# Patient Record
Sex: MALE | Race: WHITE | Employment: FULL TIME | URBAN - METROPOLITAN AREA
[De-identification: names, ages, dates, MRNs, and addresses within clinical notes are randomized per-mention and may not be internally consistent; named-entity substitution may affect disease eponyms.]

---

## 2018-01-10 ENCOUNTER — HOSPITAL ENCOUNTER (OUTPATIENT)
Dept: PREADMISSION TESTING | Age: 58
Discharge: HOME | End: 2018-01-10
Payer: COMMERCIAL

## 2018-01-10 VITALS — BODY MASS INDEX: 30.92 KG/M2 | WEIGHT: 197 LBS | HEIGHT: 67 IN

## 2018-01-10 LAB
ANION GAP SERPL CALC-SCNC: 12 MMOL/L (ref 3–18)
ATRIAL RATE: 90 BPM
BACTERIA SPEC CULT: ABNORMAL
BUN SERPL-MCNC: 15 MG/DL (ref 7–18)
BUN/CREAT SERPL: 22 (ref 12–20)
CALCIUM SERPL-MCNC: 9.4 MG/DL (ref 8.5–10.1)
CALCULATED P AXIS, ECG09: 56 DEGREES
CALCULATED R AXIS, ECG10: -74 DEGREES
CALCULATED T AXIS, ECG11: 33 DEGREES
CHLORIDE SERPL-SCNC: 103 MMOL/L (ref 100–108)
CO2 SERPL-SCNC: 27 MMOL/L (ref 21–32)
CREAT SERPL-MCNC: 0.69 MG/DL (ref 0.6–1.3)
DIAGNOSIS, 93000: NORMAL
ERYTHROCYTE [DISTWIDTH] IN BLOOD BY AUTOMATED COUNT: 14 % (ref 11.6–14.5)
GLUCOSE SERPL-MCNC: 110 MG/DL (ref 74–99)
HCT VFR BLD AUTO: 43.8 % (ref 36–48)
HGB BLD-MCNC: 14.6 G/DL (ref 13–16)
MCH RBC QN AUTO: 30.1 PG (ref 24–34)
MCHC RBC AUTO-ENTMCNC: 33.3 G/DL (ref 31–37)
MCV RBC AUTO: 90.3 FL (ref 74–97)
P-R INTERVAL, ECG05: 160 MS
PLATELET # BLD AUTO: 490 K/UL (ref 135–420)
PMV BLD AUTO: 9.5 FL (ref 9.2–11.8)
POTASSIUM SERPL-SCNC: 4.4 MMOL/L (ref 3.5–5.5)
Q-T INTERVAL, ECG07: 376 MS
QRS DURATION, ECG06: 104 MS
QTC CALCULATION (BEZET), ECG08: 459 MS
RBC # BLD AUTO: 4.85 M/UL (ref 4.7–5.5)
SERVICE CMNT-IMP: ABNORMAL
SODIUM SERPL-SCNC: 142 MMOL/L (ref 136–145)
VENTRICULAR RATE, ECG03: 90 BPM
WBC # BLD AUTO: 9.7 K/UL (ref 4.6–13.2)

## 2018-01-10 PROCEDURE — 85027 COMPLETE CBC AUTOMATED: CPT | Performed by: ORTHOPAEDIC SURGERY

## 2018-01-10 PROCEDURE — 87641 MR-STAPH DNA AMP PROBE: CPT | Performed by: ORTHOPAEDIC SURGERY

## 2018-01-10 PROCEDURE — 93005 ELECTROCARDIOGRAM TRACING: CPT

## 2018-01-10 PROCEDURE — 80048 BASIC METABOLIC PNL TOTAL CA: CPT | Performed by: ORTHOPAEDIC SURGERY

## 2018-01-10 RX ORDER — TRAMADOL HYDROCHLORIDE 50 MG/1
50 TABLET ORAL
COMMUNITY
End: 2018-01-29

## 2018-01-10 RX ORDER — ROSUVASTATIN CALCIUM 40 MG/1
20 TABLET, COATED ORAL DAILY
COMMUNITY

## 2018-01-10 RX ORDER — DICLOFENAC SODIUM 75 MG/1
75 TABLET, DELAYED RELEASE ORAL 2 TIMES DAILY
COMMUNITY
End: 2018-01-29

## 2018-01-10 RX ORDER — CEFAZOLIN SODIUM 2 G/50ML
2 SOLUTION INTRAVENOUS ONCE
Status: CANCELLED | OUTPATIENT
Start: 2018-01-10 | End: 2018-01-10

## 2018-01-10 RX ORDER — NORTRIPTYLINE HYDROCHLORIDE 10 MG/1
40 CAPSULE ORAL
COMMUNITY
End: 2018-02-12

## 2018-01-10 RX ORDER — TIZANIDINE HYDROCHLORIDE 4 MG/1
4 CAPSULE, GELATIN COATED ORAL 2 TIMES DAILY
COMMUNITY
End: 2018-01-29

## 2018-01-10 RX ORDER — SODIUM CHLORIDE, SODIUM LACTATE, POTASSIUM CHLORIDE, CALCIUM CHLORIDE 600; 310; 30; 20 MG/100ML; MG/100ML; MG/100ML; MG/100ML
125 INJECTION, SOLUTION INTRAVENOUS CONTINUOUS
Status: CANCELLED | OUTPATIENT
Start: 2018-01-10

## 2018-01-11 NOTE — PERIOP NOTES
Notified surgeons office that patient is MRSA positive. Spoke with patient concerning positive MRSA, aware of RX from surgeon [facility], explained directions for use, explained colonization of MRSA and made aware of contact isolation when hospitalized.

## 2018-01-16 PROBLEM — M48.02 CERVICAL SPINAL STENOSIS: Status: ACTIVE | Noted: 2018-01-16

## 2018-01-16 PROBLEM — M54.10 RADICULOPATHY AFFECTING UPPER EXTREMITY: Status: ACTIVE | Noted: 2018-01-16

## 2018-01-16 PROBLEM — M47.812 CERVICAL SPONDYLOSIS: Status: ACTIVE | Noted: 2018-01-16

## 2018-01-16 NOTE — H&P
Patient Name:   Emili Capps  YOB: 1960      Chief Complaint:  Neck pain. History of Chief Complaint:  She presents today for followup after a recent MRI of the cervical spine. We discussed this does show cervical spinal stenosis at C5 to C7. She is still having significant discomfort that she has been trying to work through Dr. Zeinab Downey, one of our pain management doctors. Unfortunately, they have failed after multiple epidurals and facet joint injections. She would like to move forward with further treatment. We discussed that this would require anterior cervical discectomy and fusion at the C5 to C7 levels. She feels as though this is necessary as she has failed conservative treatment, and she feels as though her activities of daily living have now been affected. She denies any fevers, chills, night sweats, nausea, vomiting, or shortness of breath. Past Medical/Surgical History:    Disease/Disorder Type Date Side Surgery Date Side Comment       Lymphadenectomy          Shoulder surgery  bilateral        Appendectomy      Acid reflux          Arthritis          High cholesterol          Pancreatitis              Hernia repair 1968  [facility] 09/14/2017 -x2       Staging Laparotomy and Splenectomy 1983         Wrist surgery, with screw 1988 left        Appendectomy 2005     Cancer, lymphoma  1983          Allergies:  No known allergies.   Ingredient Reaction Medication Name Comment   NO KNOWN ALLERGIES   NO KNOWN ALLERGIES     Current Medications:    Medication Directions   Crestor 40 mg tablet    Nexium 40 mg capsule,delayed release    DICLOFENAC SOD EC 75 MG TAB TAKE 1 TABLET BY MOUTH TWICE A DAY   diclofenac sodium 75 mg tablet,delayed release TAKE 1 TABLET TWICE DAILY   Ultram 50 mg tablet take 1 -2  tablets (50mg) by oral route  every 6 hours as needed     Social History:    SMOKING  Status Tobacco Type Units Per Day Yrs Used   Former smoker Cigarette       ALCOHOL  Type: Beer and liquor. consumed weekly. Family History:    Disease Detail Family Member Age Cause of Death Comments   Cancer, unknown Mother  N    Rheumatoid arthritis Father  N    Cardiovascular disease Mother  N      Review of Systems:   Pertinent negatives include fever, fainting and swelling of feet. Vitals:  Date BP Pulse Temp (F) Resp. (per min.) Height (Total in.) Weight (lbs.) BMI   12/04/2017 191/114 89   68.00  27.37   10/23/2017 164/104 97   68.00  27.37   09/12/2017 170/96 90  16 68.00  28.13   12/28/2016     68.00  28.13     Physical Examination:    The cervical spine has a normal appearance and no spasm of the muscles. There is no tenderness on palpation of the trapezius. Flexion, extension, and right and left rotation of the cervical spine were normal.  Examination of the shoulder shows no warmth, deformity, or muscle atrophy. There is no tenderness on palpation of the subacromial bursa, the glenohumeral joint region, or the bicipital groove. Range of motion of the shoulder is normal in abduction, forward flexion, extension, and in internal and external rotation. No pain is elicited by motion of the shoulder or by impingement testing. No instability of the shoulder or neck was noted. Sensory and motor examination for the cervical spine demonstrated no tactile dysesthesia or hyperesthesia and normal motor strength (5/5) of the upper extremities in all myotomes. Reflexes were 2/4 in the biceps, triceps, and brachioradialis bilaterally. There was no weakness noted in the hand or fingers and  was equal.  Gait and stance were normal.  Knee and ankle jerks were normal without clonus. The patient was oriented with no signs of mood abnormality. There is tenderness over the cervical paraspinals. There is pain with flexion and extension. Assessment:   1. Cervical degenerative disc disease, C5 through C7.  2.  Cervical facet joint arthropathy, C5 through C7.  3.  Cervical loss of lordosis.   4.  Cervical spinal stenosis, C5 to C7.   5.  Right shoulder impingement syndrome. 6.  Failure of conservative care including epidural steroid injections, facet joint injections, interventional pain management, anti-inflammatories, and analgesics. Recommendation: At this time, as previously discussed, we do feel as though she has failed conservative measures and would require an anterior cervical discectomy and fusion at the C5 to C7 levels. She would like to move forward with this. The risks versus the benefits as well as the alternatives were fully explained to the patient. Risks include, but are not limited to, paralysis, death, heart attack, stroke, pulmonary embolism, deep vein thrombosis, infection, failure to relieve pain, increase in back or arm pain, reherniation, scarring, spinal fluid leak, bowel or bladder dysfunction, bleeding, disease transmission, instrumentation failure, pseudarthrosis, difficulty swallowing, and need for revision surgery. The patient states full understanding of the risks and benefits and wishes to proceed. At this point, we will have her follow up with us postoperatively. She will call with any and all concerns.

## 2018-01-27 ENCOUNTER — ANESTHESIA EVENT (OUTPATIENT)
Dept: SURGERY | Age: 58
End: 2018-01-27
Payer: COMMERCIAL

## 2018-01-29 ENCOUNTER — APPOINTMENT (OUTPATIENT)
Dept: GENERAL RADIOLOGY | Age: 58
End: 2018-01-29
Attending: ORTHOPAEDIC SURGERY
Payer: COMMERCIAL

## 2018-01-29 ENCOUNTER — HOSPITAL ENCOUNTER (OUTPATIENT)
Age: 58
Setting detail: OUTPATIENT SURGERY
Discharge: HOME | End: 2018-01-29
Attending: ORTHOPAEDIC SURGERY | Admitting: ORTHOPAEDIC SURGERY
Payer: COMMERCIAL

## 2018-01-29 ENCOUNTER — ANESTHESIA (OUTPATIENT)
Dept: SURGERY | Age: 58
End: 2018-01-29
Payer: COMMERCIAL

## 2018-01-29 VITALS
RESPIRATION RATE: 12 BRPM | OXYGEN SATURATION: 95 % | HEIGHT: 68 IN | SYSTOLIC BLOOD PRESSURE: 135 MMHG | TEMPERATURE: 97.9 F | DIASTOLIC BLOOD PRESSURE: 87 MMHG | BODY MASS INDEX: 28.36 KG/M2 | WEIGHT: 187.13 LBS | HEART RATE: 79 BPM

## 2018-01-29 DIAGNOSIS — M48.02 CERVICAL SPINAL STENOSIS: Primary | ICD-10-CM

## 2018-01-29 LAB
ABO + RH BLD: NORMAL
BLOOD GROUP ANTIBODIES SERPL: NORMAL
SPECIMEN EXP DATE BLD: NORMAL

## 2018-01-29 PROCEDURE — 77030018836 HC SOL IRR NACL ICUM -A: Performed by: ORTHOPAEDIC SURGERY

## 2018-01-29 PROCEDURE — 76060000032 HC ANESTHESIA 0.5 TO 1 HR: Performed by: ORTHOPAEDIC SURGERY

## 2018-01-29 PROCEDURE — 77030003028 HC SUT VCRL J&J -A: Performed by: ORTHOPAEDIC SURGERY

## 2018-01-29 PROCEDURE — 74011250636 HC RX REV CODE- 250/636: Performed by: SPECIALIST

## 2018-01-29 PROCEDURE — 77030033138 HC SUT PGA STRATFX J&J -B: Performed by: ORTHOPAEDIC SURGERY

## 2018-01-29 PROCEDURE — 77030032490 HC SLV COMPR SCD KNE COVD -B: Performed by: ORTHOPAEDIC SURGERY

## 2018-01-29 PROCEDURE — 74011000250 HC RX REV CODE- 250

## 2018-01-29 PROCEDURE — 74011250636 HC RX REV CODE- 250/636

## 2018-01-29 PROCEDURE — 36415 COLL VENOUS BLD VENIPUNCTURE: CPT | Performed by: ORTHOPAEDIC SURGERY

## 2018-01-29 PROCEDURE — 76210000006 HC OR PH I REC 0.5 TO 1 HR: Performed by: ORTHOPAEDIC SURGERY

## 2018-01-29 PROCEDURE — 77030037400 HC ADH TISS HI VISC EXOFIN CHMP -B: Performed by: ORTHOPAEDIC SURGERY

## 2018-01-29 PROCEDURE — 76210000021 HC REC RM PH II 0.5 TO 1 HR: Performed by: ORTHOPAEDIC SURGERY

## 2018-01-29 PROCEDURE — 77030006643: Performed by: SPECIALIST

## 2018-01-29 PROCEDURE — 77030008683 HC TU ET CUF COVD -A: Performed by: SPECIALIST

## 2018-01-29 PROCEDURE — 86900 BLOOD TYPING SEROLOGIC ABO: CPT | Performed by: ORTHOPAEDIC SURGERY

## 2018-01-29 PROCEDURE — 77030008477 HC STYL SATN SLP COVD -A: Performed by: SPECIALIST

## 2018-01-29 PROCEDURE — 77030020782 HC GWN BAIR PAWS FLX 3M -B: Performed by: ORTHOPAEDIC SURGERY

## 2018-01-29 PROCEDURE — 76010000138 HC OR TIME 0.5 TO 1 HR: Performed by: ORTHOPAEDIC SURGERY

## 2018-01-29 PROCEDURE — 77030003445 HC NDL BIOP BN BD -B: Performed by: ORTHOPAEDIC SURGERY

## 2018-01-29 PROCEDURE — 77030011247 HC DRN WND KT TLS STRY -B: Performed by: ORTHOPAEDIC SURGERY

## 2018-01-29 PROCEDURE — 74011250636 HC RX REV CODE- 250/636: Performed by: ORTHOPAEDIC SURGERY

## 2018-01-29 PROCEDURE — 74011250637 HC RX REV CODE- 250/637: Performed by: SPECIALIST

## 2018-01-29 PROCEDURE — 77030011267 HC ELECTRD BLD COVD -A: Performed by: ORTHOPAEDIC SURGERY

## 2018-01-29 RX ORDER — SODIUM CHLORIDE, SODIUM LACTATE, POTASSIUM CHLORIDE, CALCIUM CHLORIDE 600; 310; 30; 20 MG/100ML; MG/100ML; MG/100ML; MG/100ML
125 INJECTION, SOLUTION INTRAVENOUS CONTINUOUS
Status: DISCONTINUED | OUTPATIENT
Start: 2018-01-29 | End: 2018-01-29 | Stop reason: HOSPADM

## 2018-01-29 RX ORDER — ACETAMINOPHEN 500 MG
1000 TABLET ORAL ONCE
Status: COMPLETED | OUTPATIENT
Start: 2018-01-29 | End: 2018-01-29

## 2018-01-29 RX ORDER — PROPOFOL 10 MG/ML
INJECTION, EMULSION INTRAVENOUS AS NEEDED
Status: DISCONTINUED | OUTPATIENT
Start: 2018-01-29 | End: 2018-01-30 | Stop reason: HOSPADM

## 2018-01-29 RX ORDER — LIDOCAINE HYDROCHLORIDE 20 MG/ML
INJECTION, SOLUTION EPIDURAL; INFILTRATION; INTRACAUDAL; PERINEURAL AS NEEDED
Status: DISCONTINUED | OUTPATIENT
Start: 2018-01-29 | End: 2018-01-30 | Stop reason: HOSPADM

## 2018-01-29 RX ORDER — DEXMEDETOMIDINE HYDROCHLORIDE 4 UG/ML
INJECTION, SOLUTION INTRAVENOUS AS NEEDED
Status: DISCONTINUED | OUTPATIENT
Start: 2018-01-29 | End: 2018-01-30 | Stop reason: HOSPADM

## 2018-01-29 RX ORDER — KETAMINE HYDROCHLORIDE 10 MG/ML
INJECTION, SOLUTION INTRAMUSCULAR; INTRAVENOUS AS NEEDED
Status: DISCONTINUED | OUTPATIENT
Start: 2018-01-29 | End: 2018-01-30 | Stop reason: HOSPADM

## 2018-01-29 RX ORDER — CIPROFLOXACIN 750 MG/1
750 TABLET, FILM COATED ORAL EVERY 12 HOURS
Qty: 14 TAB | Refills: 0 | Status: SHIPPED | OUTPATIENT
Start: 2018-01-29 | End: 2018-02-05

## 2018-01-29 RX ORDER — MAGNESIUM SULFATE 100 %
4 CRYSTALS MISCELLANEOUS AS NEEDED
Status: DISCONTINUED | OUTPATIENT
Start: 2018-01-29 | End: 2018-01-29 | Stop reason: HOSPADM

## 2018-01-29 RX ORDER — DEXAMETHASONE SODIUM PHOSPHATE 4 MG/ML
8 INJECTION, SOLUTION INTRA-ARTICULAR; INTRALESIONAL; INTRAMUSCULAR; INTRAVENOUS; SOFT TISSUE ONCE
Status: COMPLETED | OUTPATIENT
Start: 2018-01-29 | End: 2018-01-29

## 2018-01-29 RX ORDER — SODIUM CHLORIDE 0.9 % (FLUSH) 0.9 %
5-10 SYRINGE (ML) INJECTION AS NEEDED
Status: DISCONTINUED | OUTPATIENT
Start: 2018-01-29 | End: 2018-01-29 | Stop reason: HOSPADM

## 2018-01-29 RX ORDER — NALOXONE HYDROCHLORIDE 0.4 MG/ML
0.1 INJECTION, SOLUTION INTRAMUSCULAR; INTRAVENOUS; SUBCUTANEOUS AS NEEDED
Status: DISCONTINUED | OUTPATIENT
Start: 2018-01-29 | End: 2018-01-29 | Stop reason: HOSPADM

## 2018-01-29 RX ORDER — MIDAZOLAM HYDROCHLORIDE 1 MG/ML
INJECTION, SOLUTION INTRAMUSCULAR; INTRAVENOUS AS NEEDED
Status: DISCONTINUED | OUTPATIENT
Start: 2018-01-29 | End: 2018-01-30 | Stop reason: HOSPADM

## 2018-01-29 RX ORDER — METHOCARBAMOL 500 MG/1
750 TABLET, FILM COATED ORAL 4 TIMES DAILY
Status: DISCONTINUED | OUTPATIENT
Start: 2018-01-29 | End: 2018-01-29 | Stop reason: HOSPADM

## 2018-01-29 RX ORDER — DEXTROSE 50 % IN WATER (D50W) INTRAVENOUS SYRINGE
25-50 AS NEEDED
Status: DISCONTINUED | OUTPATIENT
Start: 2018-01-29 | End: 2018-01-29 | Stop reason: HOSPADM

## 2018-01-29 RX ORDER — METHOCARBAMOL 750 MG/1
750 TABLET, FILM COATED ORAL 4 TIMES DAILY
Qty: 60 TAB | Refills: 0 | Status: SHIPPED | OUTPATIENT
Start: 2018-01-29 | End: 2018-02-14

## 2018-01-29 RX ORDER — GABAPENTIN 100 MG/1
100 CAPSULE ORAL DAILY
Status: ON HOLD | COMMUNITY
End: 2018-05-28

## 2018-01-29 RX ORDER — ONDANSETRON 2 MG/ML
4 INJECTION INTRAMUSCULAR; INTRAVENOUS ONCE
Status: DISCONTINUED | OUTPATIENT
Start: 2018-01-29 | End: 2018-01-29 | Stop reason: HOSPADM

## 2018-01-29 RX ORDER — LABETALOL HYDROCHLORIDE 5 MG/ML
INJECTION, SOLUTION INTRAVENOUS AS NEEDED
Status: DISCONTINUED | OUTPATIENT
Start: 2018-01-29 | End: 2018-01-30 | Stop reason: HOSPADM

## 2018-01-29 RX ORDER — VANCOMYCIN HCL IN 5 % DEXTROSE 1.25 G/25
1250 PLASTIC BAG, INJECTION (ML) INTRAVENOUS ONCE
Status: COMPLETED | OUTPATIENT
Start: 2018-01-29 | End: 2018-01-29

## 2018-01-29 RX ORDER — LABETALOL HYDROCHLORIDE 5 MG/ML
5 INJECTION, SOLUTION INTRAVENOUS
Status: DISCONTINUED | OUTPATIENT
Start: 2018-01-29 | End: 2018-01-29 | Stop reason: HOSPADM

## 2018-01-29 RX ORDER — GABAPENTIN 100 MG/1
300 CAPSULE ORAL ONCE
Status: COMPLETED | OUTPATIENT
Start: 2018-01-29 | End: 2018-01-29

## 2018-01-29 RX ORDER — SODIUM CHLORIDE 9 MG/ML
125 INJECTION, SOLUTION INTRAVENOUS CONTINUOUS
Status: DISCONTINUED | OUTPATIENT
Start: 2018-01-29 | End: 2018-01-29 | Stop reason: HOSPADM

## 2018-01-29 RX ORDER — HYDROMORPHONE HYDROCHLORIDE 2 MG/ML
0.2 INJECTION, SOLUTION INTRAMUSCULAR; INTRAVENOUS; SUBCUTANEOUS
Status: DISCONTINUED | OUTPATIENT
Start: 2018-01-29 | End: 2018-01-29 | Stop reason: HOSPADM

## 2018-01-29 RX ORDER — HYDROMORPHONE HYDROCHLORIDE 2 MG/ML
0.5 INJECTION, SOLUTION INTRAMUSCULAR; INTRAVENOUS; SUBCUTANEOUS
Status: DISCONTINUED | OUTPATIENT
Start: 2018-01-29 | End: 2018-01-29 | Stop reason: HOSPADM

## 2018-01-29 RX ORDER — KETOROLAC TROMETHAMINE 30 MG/ML
30 INJECTION, SOLUTION INTRAMUSCULAR; INTRAVENOUS
Status: DISCONTINUED | OUTPATIENT
Start: 2018-01-29 | End: 2018-01-29 | Stop reason: HOSPADM

## 2018-01-29 RX ORDER — OXYCODONE AND ACETAMINOPHEN 5; 325 MG/1; MG/1
1 TABLET ORAL
Qty: 60 TAB | Refills: 0 | Status: SHIPPED | OUTPATIENT
Start: 2018-01-29 | End: 2018-02-12

## 2018-01-29 RX ORDER — OXYCODONE AND ACETAMINOPHEN 5; 325 MG/1; MG/1
1 TABLET ORAL
Status: DISCONTINUED | OUTPATIENT
Start: 2018-01-29 | End: 2018-01-29 | Stop reason: HOSPADM

## 2018-01-29 RX ORDER — FENTANYL CITRATE 50 UG/ML
25 INJECTION, SOLUTION INTRAMUSCULAR; INTRAVENOUS AS NEEDED
Status: DISCONTINUED | OUTPATIENT
Start: 2018-01-29 | End: 2018-01-29 | Stop reason: HOSPADM

## 2018-01-29 RX ADMIN — LIDOCAINE HYDROCHLORIDE 50 MG: 20 INJECTION, SOLUTION EPIDURAL; INFILTRATION; INTRACAUDAL; PERINEURAL at 09:28

## 2018-01-29 RX ADMIN — GABAPENTIN 300 MG: 100 CAPSULE ORAL at 07:42

## 2018-01-29 RX ADMIN — DEXMEDETOMIDINE HYDROCHLORIDE 4 MCG: 4 INJECTION, SOLUTION INTRAVENOUS at 09:23

## 2018-01-29 RX ADMIN — MIDAZOLAM HYDROCHLORIDE 2 MG: 1 INJECTION, SOLUTION INTRAMUSCULAR; INTRAVENOUS at 09:18

## 2018-01-29 RX ADMIN — LIDOCAINE HYDROCHLORIDE 100 MG: 20 INJECTION, SOLUTION EPIDURAL; INFILTRATION; INTRACAUDAL; PERINEURAL at 09:25

## 2018-01-29 RX ADMIN — DEXAMETHASONE SODIUM PHOSPHATE 8 MG: 4 INJECTION, SOLUTION INTRA-ARTICULAR; INTRALESIONAL; INTRAMUSCULAR; INTRAVENOUS; SOFT TISSUE at 08:31

## 2018-01-29 RX ADMIN — DEXMEDETOMIDINE HYDROCHLORIDE 4 MCG: 4 INJECTION, SOLUTION INTRAVENOUS at 09:30

## 2018-01-29 RX ADMIN — PROPOFOL 100 MG: 10 INJECTION, EMULSION INTRAVENOUS at 09:25

## 2018-01-29 RX ADMIN — VANCOMYCIN HYDROCHLORIDE 1 G: 10 INJECTION, POWDER, LYOPHILIZED, FOR SOLUTION INTRAVENOUS at 09:23

## 2018-01-29 RX ADMIN — KETAMINE HYDROCHLORIDE 40 MG: 10 INJECTION, SOLUTION INTRAMUSCULAR; INTRAVENOUS at 09:25

## 2018-01-29 RX ADMIN — LABETALOL HYDROCHLORIDE 10 MG: 5 INJECTION, SOLUTION INTRAVENOUS at 09:56

## 2018-01-29 RX ADMIN — DEXMEDETOMIDINE HYDROCHLORIDE 4 MCG: 4 INJECTION, SOLUTION INTRAVENOUS at 09:25

## 2018-01-29 RX ADMIN — SODIUM CHLORIDE, SODIUM LACTATE, POTASSIUM CHLORIDE, AND CALCIUM CHLORIDE 125 ML/HR: 600; 310; 30; 20 INJECTION, SOLUTION INTRAVENOUS at 08:30

## 2018-01-29 RX ADMIN — ACETAMINOPHEN 1000 MG: 500 TABLET ORAL at 08:30

## 2018-01-29 RX ADMIN — PROPOFOL 50 MG: 10 INJECTION, EMULSION INTRAVENOUS at 09:28

## 2018-01-29 RX ADMIN — DEXMEDETOMIDINE HYDROCHLORIDE 4 MCG: 4 INJECTION, SOLUTION INTRAVENOUS at 09:28

## 2018-01-29 RX ADMIN — DEXMEDETOMIDINE HYDROCHLORIDE 4 MCG: 4 INJECTION, SOLUTION INTRAVENOUS at 09:43

## 2018-01-29 NOTE — INTERVAL H&P NOTE
H&P Update:  Rosie Rizo was seen and examined. History and physical has been reviewed. The patient has been examined. There have been no significant clinical changes since the completion of the originally dated History and Physical.  Patient identified by surgeon; surgical site was confirmed by patient and surgeon.     Signed By: Maldonado Parrish MD     January 29, 2018 7:33 AM

## 2018-01-29 NOTE — ANESTHESIA POSTPROCEDURE EVALUATION
Post-Anesthesia Evaluation and Assessment    Cardiovascular Function/Vital Signs  Visit Vitals    BP (!) 135/91    Pulse 81    Temp 36.5 °C (97.7 °F)    Resp 12    Ht 5' 8\" (1.727 m)    Wt 84.9 kg (187 lb 2 oz)    SpO2 95%    BMI 28.45 kg/m2       Patient is status post Procedure(s):  PROCEDURE CANCELLED - NOT PERFORMED/ PROCEDURE CANCELLED AFTER INTUBATION. Nausea/Vomiting: Controlled. Postoperative hydration reviewed and adequate. Pain:  Pain Scale 1: Visual (01/29/18 1003)  Pain Intensity 1: 0 (01/29/18 1003)   Managed. Neurological Status:   Neuro (WDL): Exceptions to WDL (01/29/18 0723)   At baseline. Mental Status and Level of Consciousness: Arousable. Pulmonary Status:   O2 Device: Nasal cannula (01/29/18 1021)   Adequate oxygenation and airway patent. Complications related to anesthesia: None    Post-anesthesia assessment completed. No concerns. Patient has met all discharge requirements.     Signed By: Sade Reyes MD    January 29, 2018

## 2018-01-29 NOTE — PERIOP NOTES
TRANSFER - OUT REPORT:    Verbal report given to Sapphire España RN(name) on Kristen Barron  being transferred to Phase II(unit) for routine progression of care       Report consisted of patients Situation, Background, Assessment and   Recommendations(SBAR). Information from the following report(s) OR Summary and MAR was reviewed with the receiving nurse. Lines:   Peripheral IV 01/29/18 Right Forearm (Active)   Site Assessment Clean, dry, & intact 1/29/2018 10:26 AM   Phlebitis Assessment 0 1/29/2018 10:26 AM   Infiltration Assessment 0 1/29/2018 10:26 AM   Dressing Status Clean, dry, & intact 1/29/2018 10:26 AM   Dressing Type Transparent 1/29/2018 10:26 AM   Hub Color/Line Status Green; Infusing 1/29/2018 10:26 AM        Opportunity for questions and clarification was provided.       Patient transported with:   O2 @ 2 liters

## 2018-01-29 NOTE — IP AVS SNAPSHOT
[address] [address] Patient: Crystal Herzog MRN: [id number] Zacarias Darnell About your hospitalization You were admitted on:  January 29, 2018 You last received care in the:  [facility] PHASE 2 RECOVERY You were discharged on:  January 29, 2018 Why you were hospitalized Your primary diagnosis was:  Cervical Spinal Stenosis Your diagnoses also included:  Cervical Spondylosis, Radiculopathy Affecting Upper Extremity Follow-up Information Follow up With Details Comments Contact Info Marleni Vasquez MD   [address] [place] [id number] [phone] Discharge Orders None A check ama indicates which time of day the medication should be taken. My Medications START taking these medications Instructions Each Dose to Equal  
 Morning Noon Evening Bedtime  
 ciprofloxacin HCl 750 mg tablet Commonly known as:  CIPRO Your last dose was: Your next dose is: Take 1 Tab by mouth every twelve (12) hours for 7 days. 750 mg  
    
   
   
   
  
 methocarbamol 750 mg tablet Commonly known as:  ROBAXIN Your last dose was: Your next dose is: Take 1 Tab by mouth four (4) times daily. 750 mg  
    
   
   
   
  
 oxyCODONE-acetaminophen 5-325 mg per tablet Commonly known as:  PERCOCET Your last dose was: Your next dose is: Take 1 Tab by mouth every four (4) hours as needed. Max Daily Amount: 6 Tabs. 1 Tab CONTINUE taking these medications Instructions Each Dose to Equal  
 Morning Noon Evening Bedtime CRESTOR 40 mg tablet Generic drug:  rosuvastatin Your last dose was: Your next dose is: Take 40 mg by mouth daily. 40 mg  
    
   
   
   
  
 FIBER LAXATIVE PO Your last dose was: Your next dose is: Take  by mouth.  
     
   
   
   
  
 gabapentin 100 mg capsule Commonly known as:  NEURONTIN Your last dose was: Your next dose is: Take 100 mg by mouth daily. 100 mg MULTI-VITAMIN PO Your last dose was: Your next dose is: Take  by mouth. NexIUM 20 mg capsule Generic drug:  esomeprazole Your last dose was: Your next dose is: Take 40 mg by mouth daily. 40 mg  
    
   
   
   
  
 nortriptyline 10 mg capsule Commonly known as:  PAMELOR Your last dose was: Your next dose is: Take 40 mg by mouth nightly. 40 mg  
    
   
   
   
  
  
STOP taking these medications BACTROBAN NASAL 2 % nasal ointment Generic drug:  mupirocin calcium  
   
  
 diclofenac EC 75 mg EC tablet Commonly known as:  VOLTAREN  
   
  
 tiZANidine 4 mg capsule Commonly known as:  ZANAFLEX  
   
  
 traMADol 50 mg tablet Commonly known as:  ULTRAM  
   
  
  
  
Where to Get Your Medications Information on where to get these meds will be given to you by the nurse or doctor. ! Ask your nurse or doctor about these medications  
  ciprofloxacin HCl 750 mg tablet  
 methocarbamol 750 mg tablet  
 oxyCODONE-acetaminophen 5-325 mg per tablet Discharge Instructions Dr. Ochoa Stake Operative Instructions: Cervical Fusion *YOU MUST AVOID SMOKING OR BEING AROUND ANYONE WHO SMOKES. AVOID ALL PRODUCTS THAT CONTAIN NICOTINE. DO NOT TAKE IBUPROFEN OR ANTI--INFLAMMATORIES, AS THESE MAY ALTER THE HEALING OF THE FUSION. Diet: 1. Begin with liquids and light foods such as jell-o or soups 2. Advance as tolerated to your regular diet if not nauseated. 3.  Swallowing difficulties may be experienced up to 2 weeks post-operatively. Modify food thickness as necessary. You may also obtain over-the-counter chloraseptic spray. Medications: 1. Strong oral narcotic pain medications have been prescribed for the first few days. Use only as directed. No pain medication is capable of taking away all the pain. Taking your pills at regular intervals will give you the best chance of having less pain. 2. If you need a refill PLEASE PLAN AHEAD. Call our office during regular hours (8-5). 3. Do not combine with alcoholic beverages. 4. Be careful as you walk, climb stairs or drive as mild dizziness is not unusual. 
5. Do not take medications that have not been prescribed by your surgeon. 6. You may switch to over the counter pain medication of your choice as you become more comfortable. Activity and Restrictions: 
1. You should not drive until you are clear by your surgeon at your post-operative visit. 2.  In regards to your cervical collar, you MUST wear this at all times until your first follow-up appointment. 3.  You should wear the collar even when sleeping. 4.  It can be removed for short periods of time as long as you are keeping your head centered over the shoulders without rotating or looking up or down. 5. You may take short walks inside and outside of your home and climb stairs. 6. You are to avoid work, housework, yard work, snow shoveling, lifting of more than a few pounds, overhead work or strenuous activity. 7. Avoid any excessive stretching or range-of-motion of the neck. Non-painful range-of-motion of the neck is allowed 8. Follow-up with Dr. Marianela Bartlett in 7-10 days. DRIVIN. You should not drive until after your follow-up appointment. 2.  You can be in a vehicle for short distances, but if you travel any long distance, please stop about every 30 minutes and walk/stretch. 3.  You should NEVER drive while taking narcotic medication. BATHING and INCISION CARE: 
1.  The incision may be tender to touch or feel numb: this is normal.  
2.  Keep the incision clean and dry no showering until your follow-up appointment. The incision will be closed with sutures under the skin and the skin will be glued. 3.  Do not apply any lotions, ointments or oils on the incision. 4.  If you notice any excessive swelling, redness, or persistent drainage around the incision, notify the office immediately. RETURN TO WORK : 
1. The decision to return to work will be determined on an individual basis. 2.  Many people who have a strenuous job (construction, heavy labor, etc) may need to be off work for up to 8 weeks. 3.  If you need a work note, please let us know as soon as possible, and not the same day you are planning to return to work. NUTRITION : 
1.  Good nutrition is an essential part of healing. 2.  You should eat a balanced diet each day, including fruits, vegetables, dairy products and protein. 3.  Remember to drink plenty of water. 4.  If you have not had a bowel movement within 3 days of surgery, you will need to use a laxative or suppository that can be obtained over-the-counter at your local pharmacy. BONE STIMULATOR: 
1. A spinal bone stimulator may be prescribed for you under certain situations. 2.  A nurse will call you if one has been requested and discuss its use for approximately 4-6 months post-op every day. 3.  This device assists in bone healing and fusion. CALL THE OFFICE: 
? If you have severe pain unrelieved by the medications, new numbness or tingling in your arms; 
? If you have a fever of 101.0°F or greater;  
? If you notice excessive swelling, redness, or persistent drainage from the incision or IV site; [facility] office number is [phone] from 8:00am to 5:00pm Monday through Friday. After 5:00pm, on weekends, or holidays, please leave a message with our answering service and the doctor on-call will get back to you shortly. DISCHARGE SUMMARY from Nurse PATIENT INSTRUCTIONS: 
 
 
F-face looks uneven A-arms unable to move or move unevenly S-speech slurred or non-existent T-time-call 911 as soon as signs and symptoms begin-DO NOT go Back to bed or wait to see if you get better-TIME IS BRAIN. Warning Signs of HEART ATTACK Call 911 if you have these symptoms: 
? Chest discomfort. Most heart attacks involve discomfort in the center of the chest that lasts more than a few minutes, or that goes away and comes back. It can feel like uncomfortable pressure, squeezing, fullness, or pain. ? Discomfort in other areas of the upper body. Symptoms can include pain or discomfort in one or both arms, the back, neck, jaw, or stomach. ? Shortness of breath with or without chest discomfort. ? Other signs may include breaking out in a cold sweat, nausea, or lightheadedness. Don't wait more than five minutes to call [address]! Fast action can save your life. Calling 911 is almost always the fastest way to get lifesaving treatment. Emergency Medical Services staff can begin treatment when they arrive  up to an hour sooner than if someone gets to the hospital by car. The discharge information has been reviewed with the patient and caregiver. The patient and caregiver verbalized understanding. Discharge medications reviewed with the patient and caregiver and appropriate educational materials and side effects teaching were provided. ___________________________________________________________________________________________________________________________________ Patient armband removed and shredded Introducing [facility]!    
 Nelson Otero introduces [email or web address] patient portal. Now you can access parts of your medical record, email your doctor's office, and request medication refills online. 1. In your internet browser, go to [email or web address] 2. Click on the First Time User? Click Here link in the Sign In box. You will see the New Member Sign Up page. 3. Enter your [employer] Access Code exactly as it appears below. You will not need to use this code after youve completed the sign-up process. If you do not sign up before the expiration date, you must request a new code. · [employer] Access Code: [email or web address] Expires: 4/8/2018 12:38 PM 
 
4. Enter the last four digits of your Social Security Number (xxxx) and Date of Birth (mm/dd/yyyy) as indicated and click Submit. You will be taken to the next sign-up page. 5. Create a [employer] ID. This will be your [employer] login ID and cannot be changed, so think of one that is secure and easy to remember. 6. Create a [employer] password. You can change your password at any time. 7. Enter your Password Reset Question and Answer. This can be used at a later time if you forget your password. 8. Enter your e-mail address. You will receive e-mail notification when new information is available in [address] 9. Click Sign Up. You can now view and download portions of your medical record. 10. Click the Download Summary menu link to download a portable copy of your medical information. If you have questions, please visit the Frequently Asked Questions section of the [employer] website. Remember, [employer] is NOT to be used for urgent needs. For medical emergencies, dial 911. Now available from your iPhone and Android! Providers Seen During Your Hospitalization Provider Specialty Primary office phone Pauline Ramos MD Orthopedic Surgery [phone] Your Primary Care Physician (PCP) Primary Care Physician Office Phone Office Fax [facility] [phone] You are allergic to the following No active allergies Recent Documentation Height Weight BMI Smoking Status 1.727 m 84.9 kg 28.45 kg/m2 Former Smoker Emergency Contacts Name Discharge Info Relation Home Work Mobile Epifanio Blount DISCHARGE CAREGIVER [3] Father [15]   [phone] Juli Brice  Parent [1] [phone] Patient Belongings The following personal items are in your possession at time of discharge: 
  Dental Appliances: None  Visual Aid: None      Home Medications: None   Jewelry: None  Clothing: Footwear, Jacket/Coat, Pants, Shirt, [employer], Socks, Undergarments (9)    Other Valuables: [employer], Eyeglasses (given to pt's dad) Please provide this summary of care documentation to your next provider. Signatures-by signing, you are acknowledging that this After Visit Summary has been reviewed with you and you have received a copy. Patient Signature:  ____________________________________________________________ Date:  ____________________________________________________________  
  
[facility] Provider Signature:  ____________________________________________________________ Date:  ____________________________________________________________

## 2018-01-29 NOTE — PROGRESS NOTES
Pre-op had stated that this hypertension was not his normal. Did improve with gabapentin but still 657C diastolic. On induction, BP very labile, low of 72/56 and high of 228/147  by less than 10 minutes. Dexmedetomidine total of 16 mcg early (4 mcg after cancel). Dr. Kayli Tolbert was worried about post-op complications, especially bleeding, if this continued post-op which I definitely agreed with. Also this pattern is more consistent with Chronic uncontrolled HTN rather than white coat syndrome.  Needs good control

## 2018-01-29 NOTE — PROGRESS NOTES
Patient case was cancelled today after intubation. Anesthesia was unable to bring either systolic or diastolic to a safe range. BP was elevated as high as 228/147 and lowest stable diastolic even with IV meds was 105. Discussed with family the significant risks of post operative bleeding, hematoma and death related to this issue and they understand need for surgical cancellation and medical attention redirected to his blood pressure. He may have to be admitted through ER to medicine today but if his BP can be brought to a stable level he will follow up tomorrow with his PCP and work on his pressure and we will not reschedule until BP is stable and at a safe level. When we reschedule him we will plan 23 hour observation with a medical consult to follow and treat any BP irregularities that could arise.

## 2018-01-29 NOTE — PERIOP NOTES
Received patient from [place] [address] and anesthesia provider. Reviewed surgical procedure, intraoperative course. Patient identified.

## 2018-01-29 NOTE — DISCHARGE INSTRUCTIONS
Dr. Arnold Amparo Operative Instructions: Cervical Fusion    *YOU MUST AVOID SMOKING OR BEING AROUND ANYONE WHO SMOKES. AVOID ALL PRODUCTS THAT CONTAIN NICOTINE. DO NOT TAKE IBUPROFEN OR ANTI--INFLAMMATORIES, AS THESE MAY ALTER THE HEALING OF THE FUSION. Diet:   1. Begin with liquids and light foods such as jell-o or soups  2. Advance as tolerated to your regular diet if not nauseated. 3.  Swallowing difficulties may be experienced up to 2 weeks post-operatively. Modify food thickness as necessary. You may also obtain over-the-counter chloraseptic spray. Medications:  1. Strong oral narcotic pain medications have been prescribed for the first few days. Use only as directed. No pain medication is capable of taking away all the pain. Taking your pills at regular intervals will give you the best chance of having less pain. 2. If you need a refill PLEASE PLAN AHEAD. Call our office during regular hours (8-5). 3. Do not combine with alcoholic beverages. 4. Be careful as you walk, climb stairs or drive as mild dizziness is not unusual.  5. Do not take medications that have not been prescribed by your surgeon. 6. You may switch to over the counter pain medication of your choice as you become more comfortable. Activity and Restrictions:  1. You should not drive until you are clear by your surgeon at your post-operative visit. 2.  In regards to your cervical collar, you MUST wear this at all times until your first follow-up appointment. 3.  You should wear the collar even when sleeping. 4.  It can be removed for short periods of time as long as you are keeping your head centered over the shoulders without rotating or looking up or down. 5. You may take short walks inside and outside of your home and climb stairs. 6. You are to avoid work, housework, yard work, snow shoveling, lifting of more than a few pounds, overhead work or strenuous activity.   7. Avoid any excessive stretching or range-of-motion of the neck. Non-painful range-of-motion of the neck is allowed  8. Follow-up with Dr. Saurav Campoverde in 7-10 days. DRIVIN. You should not drive until after your follow-up appointment. 2.  You can be in a vehicle for short distances, but if you travel any long distance, please stop about every 30 minutes and walk/stretch. 3.  You should NEVER drive while taking narcotic medication. BATHING and INCISION CARE:  1. The incision may be tender to touch or feel numb: this is normal.   2.  Keep the incision clean and dry no showering until your follow-up appointment. The incision will be closed with sutures under the skin and the skin will be glued. 3.  Do not apply any lotions, ointments or oils on the incision. 4.  If you notice any excessive swelling, redness, or persistent drainage around the incision, notify the office immediately. RETURN TO WORK :  1. The decision to return to work will be determined on an individual basis. 2.  Many people who have a strenuous job (construction, heavy labor, etc) may need to be off work for up to 8 weeks. 3.  If you need a work note, please let us know as soon as possible, and not the same day you are planning to return to work. NUTRITION :  1.  Good nutrition is an essential part of healing. 2.  You should eat a balanced diet each day, including fruits, vegetables, dairy products and protein. 3.  Remember to drink plenty of water. 4.  If you have not had a bowel movement within 3 days of surgery, you will need to use a laxative or suppository that can be obtained over-the-counter at your local pharmacy. BONE STIMULATOR:  1. A spinal bone stimulator may be prescribed for you under certain situations. 2.  A nurse will call you if one has been requested and discuss its use for approximately 4-6 months post-op every day. 3.  This device assists in bone healing and fusion.     CALL THE OFFICE:   If you have severe pain unrelieved by the medications, new numbness or tingling in your arms;   If you have a fever of 101.0°F or greater;    If you notice excessive swelling, redness, or persistent drainage from the incision or IV site; [facility] office number is [phone] from 8:00am to 5:00pm Monday through Friday. After 5:00pm, on weekends, or holidays, please leave a message with our answering service and the doctor on-call will get back to you shortly. DISCHARGE SUMMARY from Nurse    PATIENT INSTRUCTIONS:    After general anesthesia or intravenous sedation, for 24 hours or while taking prescription Narcotics:  · Limit your activities  · Do not drive and operate hazardous machinery  · Do not make important personal or business decisions  · Do  not drink alcoholic beverages  · If you have not urinated within 8 hours after discharge, please contact your surgeon on call. Report the following to your surgeon:  · Excessive pain, swelling, redness or odor of or around the surgical area  · Temperature over 100.5  · Nausea and vomiting lasting longer than 4 hours or if unable to take medications  · Any signs of decreased circulation or nerve impairment to extremity: change in color, persistent  numbness, tingling, coldness or increase pain  · Any questions    What to do at Home:  Regular diet  Please follow up with your family doctor in regards to blood pressure  Notify dr Jonny Watts of progress    No driving or alcohol for 24 hrs    *  Please give a list of your current medications to your Primary Care Provider. *  Please update this list whenever your medications are discontinued, doses are      changed, or new medications (including over-the-counter products) are added. *  Please carry medication information at all times in case of emergency situations.     These are general instructions for a healthy lifestyle:    No smoking/ No tobacco products/ Avoid exposure to second hand smoke  Surgeon General's Warning:  Quitting smoking now greatly reduces serious risk to your health. Obesity, smoking, and sedentary lifestyle greatly increases your risk for illness    A healthy diet, regular physical exercise & weight monitoring are important for maintaining a healthy lifestyle    You may be retaining fluid if you have a history of heart failure or if you experience any of the following symptoms:  Weight gain of 3 pounds or more overnight or 5 pounds in a week, increased swelling in our hands or feet or shortness of breath while lying flat in bed. Please call your doctor as soon as you notice any of these symptoms; do not wait until your next office visit. Recognize signs and symptoms of STROKE:    F-face looks uneven    A-arms unable to move or move unevenly    S-speech slurred or non-existent    T-time-call 911 as soon as signs and symptoms begin-DO NOT go       Back to bed or wait to see if you get better-TIME IS BRAIN. Warning Signs of HEART ATTACK     Call 911 if you have these symptoms:   Chest discomfort. Most heart attacks involve discomfort in the center of the chest that lasts more than a few minutes, or that goes away and comes back. It can feel like uncomfortable pressure, squeezing, fullness, or pain.  Discomfort in other areas of the upper body. Symptoms can include pain or discomfort in one or both arms, the back, neck, jaw, or stomach.  Shortness of breath with or without chest discomfort.  Other signs may include breaking out in a cold sweat, nausea, or lightheadedness. Don't wait more than five minutes to call 911 - MINUTES MATTER! Fast action can save your life. Calling 911 is almost always the fastest way to get lifesaving treatment. Emergency Medical Services staff can begin treatment when they arrive -- up to an hour sooner than if someone gets to the hospital by car. The discharge information has been reviewed with the patient and caregiver.   The patient and caregiver verbalized understanding. Discharge medications reviewed with the patient and caregiver and appropriate educational materials and side effects teaching were provided.   ___________________________________________________________________________________________________________________________________      Patient armband removed and shredded

## 2018-01-29 NOTE — ANESTHESIA PREPROCEDURE EVALUATION
Anesthetic History   No history of anesthetic complications     Pertinent negatives: No PONV       Review of Systems / Medical History  Patient summary reviewed, nursing notes reviewed and pertinent labs reviewed    Pulmonary              Pertinent negatives: No COPD, asthma and smoker     Neuro/Psych   Within defined limits           Cardiovascular  Within defined limits              Pertinent negatives: No hypertension (specifically denies), past MI and CAD  Exercise tolerance[de-identified] Splits wood without hydraulics     GI/Hepatic/Renal     GERD: well controlled        Pertinent negatives: No liver disease and renal disease   Endo/Other        Arthritis and cancer  Pertinent negatives: No diabetes   Other Findings   Comments: etoh - 4           Physical Exam    Airway  Mallampati: IV  TM Distance: 4 - 6 cm  Neck ROM: decreased range of motion   Mouth opening: Normal    Comments: Extension limited by pain Cardiovascular    Rhythm: regular  Rate: normal         Dental    Dentition: Caps/crowns     Pulmonary  Breath sounds clear to auscultation               Abdominal         Other Findings            Anesthetic Plan    ASA: 2  Anesthesia type: general          Induction: Intravenous  Anesthetic plan and risks discussed with: Patient

## 2018-02-12 NOTE — H&P
Patient Name:   Adonay Kessler  YOB: 1960        Chief Complaint:  Neck pain.     History of Chief Complaint:  She presents today for followup after a recent MRI of the cervical spine. We discussed this does show cervical spinal stenosis at C5 to C7. She is still having significant discomfort that she has been trying to work through Dr. Dianne Harrison, one of our pain management doctors. Unfortunately, they have failed after multiple epidurals and facet joint injections. She would like to move forward with further treatment. We discussed that this would require anterior cervical discectomy and fusion at the C5 to C7 levels. She feels as though this is necessary as she has failed conservative treatment, and she feels as though her activities of daily living have now been affected. She denies any fevers, chills, night sweats, nausea, vomiting, or shortness of breath.     Past Medical/Surgical History:    Disease/Disorder Type Date Side Surgery Date Side Comment           Lymphadenectomy                 Shoulder surgery   bilateral             Appendectomy         Acid reflux                 Arthritis                 High cholesterol                 Pancreatitis                         Hernia repair 1968   [facility] 09/14/2017 -x2           Staging Laparotomy and Splenectomy 1983               Wrist surgery, with screw 1988 left             Appendectomy 2005       Cancer, lymphoma   1983                Allergies:  No known allergies.   Ingredient Reaction Medication Name Comment   NO KNOWN ALLERGIES     NO KNOWN ALLERGIES      Current Medications:    Medication Directions   Crestor 40 mg tablet     Nexium 40 mg capsule,delayed release     DICLOFENAC SOD EC 75 MG TAB TAKE 1 TABLET BY MOUTH TWICE A DAY   diclofenac sodium 75 mg tablet,delayed release TAKE 1 TABLET TWICE DAILY   Ultram 50 mg tablet take 1 -2  tablets (50mg) by oral route  every 6 hours as needed      Social History:    SMOKING  Status Tobacco Type Units Per Day Yrs Used   Former smoker Cigarette          ALCOHOL  Type: Beer and liquor. consumed weekly.     Family History:    Disease Detail Family Member Age Cause of Death Comments   Cancer, unknown Mother   N     Rheumatoid arthritis Father   N     Cardiovascular disease Mother   N        Review of Systems:   Pertinent negatives include fever, fainting and swelling of feet.     Vitals:  Date BP Pulse Temp (F) Resp. (per min.) Height (Total in.) Weight (lbs.) BMI   12/04/2017 191/114 89     68.00   27.37   10/23/2017 164/104 97     68.00   27.37   09/12/2017 170/96 90   16 68.00   28.13   12/28/2016         68.00   28.13      Physical Examination:    The cervical spine has a normal appearance and no spasm of the muscles. There is no tenderness on palpation of the trapezius. Flexion, extension, and right and left rotation of the cervical spine were normal.  Examination of the shoulder shows no warmth, deformity, or muscle atrophy. There is no tenderness on palpation of the subacromial bursa, the glenohumeral joint region, or the bicipital groove. Range of motion of the shoulder is normal in abduction, forward flexion, extension, and in internal and external rotation. No pain is elicited by motion of the shoulder or by impingement testing. No instability of the shoulder or neck was noted. Sensory and motor examination for the cervical spine demonstrated no tactile dysesthesia or hyperesthesia and normal motor strength (5/5) of the upper extremities in all myotomes. Reflexes were 2/4 in the biceps, triceps, and brachioradialis bilaterally. There was no weakness noted in the hand or fingers and  was equal.  Gait and stance were normal.  Knee and ankle jerks were normal without clonus. The patient was oriented with no signs of mood abnormality. There is tenderness over the cervical paraspinals. There is pain with flexion and extension.     Assessment:   1.   Cervical degenerative disc disease, C5 through C7.  2.  Cervical facet joint arthropathy, C5 through C7.  3.  Cervical loss of lordosis. 4.  Cervical spinal stenosis, C5 to C7.   5.  Right shoulder impingement syndrome. 6.  Failure of conservative care including epidural steroid injections, facet joint injections, interventional pain management, anti-inflammatories, and analgesics.       Recommendation: At this time, as previously discussed, we do feel as though she has failed conservative measures and would require an anterior cervical discectomy and fusion at the C5 to C7 levels. She would like to move forward with this. The risks versus the benefits as well as the alternatives were fully explained to the patient. Risks include, but are not limited to, paralysis, death, heart attack, stroke, pulmonary embolism, deep vein thrombosis, infection, failure to relieve pain, increase in back or arm pain, reherniation, scarring, spinal fluid leak, bowel or bladder dysfunction, bleeding, disease transmission, instrumentation failure, pseudarthrosis, difficulty swallowing, and need for revision surgery. The patient states full understanding of the risks and benefits and wishes to proceed. At this point, we will have her follow up with us postoperatively. She will call with any and all concerns.

## 2018-02-13 ENCOUNTER — HOSPITAL ENCOUNTER (OUTPATIENT)
Age: 58
Setting detail: OBSERVATION
Discharge: HOME | End: 2018-02-14
Attending: ORTHOPAEDIC SURGERY | Admitting: ORTHOPAEDIC SURGERY
Payer: COMMERCIAL

## 2018-02-13 ENCOUNTER — ANESTHESIA (OUTPATIENT)
Dept: SURGERY | Age: 58
End: 2018-02-13
Payer: COMMERCIAL

## 2018-02-13 ENCOUNTER — APPOINTMENT (OUTPATIENT)
Dept: GENERAL RADIOLOGY | Age: 58
End: 2018-02-13
Attending: ORTHOPAEDIC SURGERY
Payer: COMMERCIAL

## 2018-02-13 ENCOUNTER — ANESTHESIA EVENT (OUTPATIENT)
Dept: SURGERY | Age: 58
End: 2018-02-13
Payer: COMMERCIAL

## 2018-02-13 DIAGNOSIS — M48.02 CERVICAL SPINAL STENOSIS: Primary | ICD-10-CM

## 2018-02-13 LAB
ABO + RH BLD: NORMAL
BLOOD GROUP ANTIBODIES SERPL: NORMAL
SPECIMEN EXP DATE BLD: NORMAL

## 2018-02-13 PROCEDURE — 36415 COLL VENOUS BLD VENIPUNCTURE: CPT | Performed by: ORTHOPAEDIC SURGERY

## 2018-02-13 PROCEDURE — 77030020269 HC MISC IMPL: Performed by: ORTHOPAEDIC SURGERY

## 2018-02-13 PROCEDURE — 77030003028 HC SUT VCRL J&J -A: Performed by: ORTHOPAEDIC SURGERY

## 2018-02-13 PROCEDURE — C1713 ANCHOR/SCREW BN/BN,TIS/BN: HCPCS | Performed by: ORTHOPAEDIC SURGERY

## 2018-02-13 PROCEDURE — 77030003445 HC NDL BIOP BN BD -B: Performed by: ORTHOPAEDIC SURGERY

## 2018-02-13 PROCEDURE — 77030018836 HC SOL IRR NACL ICUM -A: Performed by: ORTHOPAEDIC SURGERY

## 2018-02-13 PROCEDURE — 74011250636 HC RX REV CODE- 250/636

## 2018-02-13 PROCEDURE — 77010033678 HC OXYGEN DAILY

## 2018-02-13 PROCEDURE — 74011250636 HC RX REV CODE- 250/636: Performed by: ORTHOPAEDIC SURGERY

## 2018-02-13 PROCEDURE — 99218 HC RM OBSERVATION: CPT

## 2018-02-13 PROCEDURE — 77030020268 HC MISC GENERAL SUPPLY: Performed by: ORTHOPAEDIC SURGERY

## 2018-02-13 PROCEDURE — 86900 BLOOD TYPING SEROLOGIC ABO: CPT | Performed by: ORTHOPAEDIC SURGERY

## 2018-02-13 PROCEDURE — 77030037400 HC ADH TISS HI VISC EXOFIN CHMP -B: Performed by: ORTHOPAEDIC SURGERY

## 2018-02-13 PROCEDURE — 77030003666 HC NDL SPINAL BD -A: Performed by: ORTHOPAEDIC SURGERY

## 2018-02-13 PROCEDURE — 77030033138 HC SUT PGA STRATFX J&J -B: Performed by: ORTHOPAEDIC SURGERY

## 2018-02-13 PROCEDURE — 76210000006 HC OR PH I REC 0.5 TO 1 HR: Performed by: ORTHOPAEDIC SURGERY

## 2018-02-13 PROCEDURE — 74011000250 HC RX REV CODE- 250

## 2018-02-13 PROCEDURE — 77030036930 HC CGE SPN PEK-HA LORDTC ARENA-C SPFT -G: Performed by: ORTHOPAEDIC SURGERY

## 2018-02-13 PROCEDURE — 77030020782 HC GWN BAIR PAWS FLX 3M -B: Performed by: ORTHOPAEDIC SURGERY

## 2018-02-13 PROCEDURE — 77030032490 HC SLV COMPR SCD KNE COVD -B: Performed by: ORTHOPAEDIC SURGERY

## 2018-02-13 PROCEDURE — 77030011267 HC ELECTRD BLD COVD -A: Performed by: ORTHOPAEDIC SURGERY

## 2018-02-13 PROCEDURE — 74011000272 HC RX REV CODE- 272: Performed by: ORTHOPAEDIC SURGERY

## 2018-02-13 PROCEDURE — 74011000250 HC RX REV CODE- 250: Performed by: ORTHOPAEDIC SURGERY

## 2018-02-13 PROCEDURE — 76010000153 HC OR TIME 1.5 TO 2 HR: Performed by: ORTHOPAEDIC SURGERY

## 2018-02-13 PROCEDURE — 74011250637 HC RX REV CODE- 250/637: Performed by: PHYSICIAN ASSISTANT

## 2018-02-13 PROCEDURE — 77030012891

## 2018-02-13 PROCEDURE — 76060000034 HC ANESTHESIA 1.5 TO 2 HR: Performed by: ORTHOPAEDIC SURGERY

## 2018-02-13 PROCEDURE — 74011250636 HC RX REV CODE- 250/636: Performed by: PHYSICIAN ASSISTANT

## 2018-02-13 DEVICE — SCREW SPNL OD4.2MM SELF DRL INDUS: Type: IMPLANTABLE DEVICE | Site: SPINE CERVICAL | Status: FUNCTIONAL

## 2018-02-13 DEVICE — PLATE SPNL L37MM 2 LEV ACP INVUE: Type: IMPLANTABLE DEVICE | Site: SPINE CERVICAL | Status: FUNCTIONAL

## 2018-02-13 DEVICE — IMPLANTABLE DEVICE: Type: IMPLANTABLE DEVICE | Site: SPINE CERVICAL | Status: FUNCTIONAL

## 2018-02-13 RX ORDER — ONDANSETRON 2 MG/ML
4 INJECTION INTRAMUSCULAR; INTRAVENOUS
Status: DISCONTINUED | OUTPATIENT
Start: 2018-02-13 | End: 2018-02-14 | Stop reason: HOSPADM

## 2018-02-13 RX ORDER — NALOXONE HYDROCHLORIDE 0.4 MG/ML
0.1 INJECTION, SOLUTION INTRAMUSCULAR; INTRAVENOUS; SUBCUTANEOUS AS NEEDED
Status: DISCONTINUED | OUTPATIENT
Start: 2018-02-13 | End: 2018-02-13 | Stop reason: HOSPADM

## 2018-02-13 RX ORDER — NEOSTIGMINE METHYLSULFATE 5 MG/5 ML
SYRINGE (ML) INTRAVENOUS AS NEEDED
Status: DISCONTINUED | OUTPATIENT
Start: 2018-02-13 | End: 2018-02-13 | Stop reason: HOSPADM

## 2018-02-13 RX ORDER — TEMAZEPAM 15 MG/1
15 CAPSULE ORAL
Status: DISCONTINUED | OUTPATIENT
Start: 2018-02-13 | End: 2018-02-14 | Stop reason: HOSPADM

## 2018-02-13 RX ORDER — DEXTROSE 50 % IN WATER (D50W) INTRAVENOUS SYRINGE
25-50 AS NEEDED
Status: DISCONTINUED | OUTPATIENT
Start: 2018-02-13 | End: 2018-02-13 | Stop reason: HOSPADM

## 2018-02-13 RX ORDER — SODIUM CHLORIDE 0.9 % (FLUSH) 0.9 %
5-10 SYRINGE (ML) INJECTION AS NEEDED
Status: DISCONTINUED | OUTPATIENT
Start: 2018-02-13 | End: 2018-02-13 | Stop reason: HOSPADM

## 2018-02-13 RX ORDER — SODIUM CHLORIDE 0.9 % (FLUSH) 0.9 %
5-10 SYRINGE (ML) INJECTION AS NEEDED
Status: DISCONTINUED | OUTPATIENT
Start: 2018-02-13 | End: 2018-02-14 | Stop reason: HOSPADM

## 2018-02-13 RX ORDER — CYCLOBENZAPRINE HCL 10 MG
10 TABLET ORAL
Status: DISCONTINUED | OUTPATIENT
Start: 2018-02-13 | End: 2018-02-14 | Stop reason: HOSPADM

## 2018-02-13 RX ORDER — SODIUM CHLORIDE, SODIUM LACTATE, POTASSIUM CHLORIDE, CALCIUM CHLORIDE 600; 310; 30; 20 MG/100ML; MG/100ML; MG/100ML; MG/100ML
1000 INJECTION, SOLUTION INTRAVENOUS CONTINUOUS
Status: DISCONTINUED | OUTPATIENT
Start: 2018-02-13 | End: 2018-02-13 | Stop reason: HOSPADM

## 2018-02-13 RX ORDER — ROCURONIUM BROMIDE 10 MG/ML
INJECTION, SOLUTION INTRAVENOUS AS NEEDED
Status: DISCONTINUED | OUTPATIENT
Start: 2018-02-13 | End: 2018-02-13 | Stop reason: HOSPADM

## 2018-02-13 RX ORDER — LIDOCAINE HYDROCHLORIDE 20 MG/ML
INJECTION, SOLUTION EPIDURAL; INFILTRATION; INTRACAUDAL; PERINEURAL AS NEEDED
Status: DISCONTINUED | OUTPATIENT
Start: 2018-02-13 | End: 2018-02-13 | Stop reason: HOSPADM

## 2018-02-13 RX ORDER — DIPHENHYDRAMINE HYDROCHLORIDE 50 MG/ML
12.5 INJECTION, SOLUTION INTRAMUSCULAR; INTRAVENOUS
Status: DISCONTINUED | OUTPATIENT
Start: 2018-02-13 | End: 2018-02-14 | Stop reason: HOSPADM

## 2018-02-13 RX ORDER — CEFAZOLIN SODIUM 2 G/50ML
2 SOLUTION INTRAVENOUS EVERY 6 HOURS
Status: DISCONTINUED | OUTPATIENT
Start: 2018-02-13 | End: 2018-02-13 | Stop reason: ALTCHOICE

## 2018-02-13 RX ORDER — NALOXONE HYDROCHLORIDE 0.4 MG/ML
0.1 INJECTION, SOLUTION INTRAMUSCULAR; INTRAVENOUS; SUBCUTANEOUS AS NEEDED
Status: DISCONTINUED | OUTPATIENT
Start: 2018-02-13 | End: 2018-02-14 | Stop reason: HOSPADM

## 2018-02-13 RX ORDER — LOSARTAN POTASSIUM 50 MG/1
100 TABLET ORAL DAILY
Status: DISCONTINUED | OUTPATIENT
Start: 2018-02-14 | End: 2018-02-14 | Stop reason: HOSPADM

## 2018-02-13 RX ORDER — VANCOMYCIN HCL IN 5 % DEXTROSE 1.25 G/25
1250 PLASTIC BAG, INJECTION (ML) INTRAVENOUS ONCE
Status: DISCONTINUED | OUTPATIENT
Start: 2018-02-13 | End: 2018-02-13 | Stop reason: RX

## 2018-02-13 RX ORDER — FLUMAZENIL 0.1 MG/ML
0.2 INJECTION INTRAVENOUS
Status: DISCONTINUED | OUTPATIENT
Start: 2018-02-13 | End: 2018-02-13 | Stop reason: HOSPADM

## 2018-02-13 RX ORDER — GABAPENTIN 100 MG/1
100 CAPSULE ORAL DAILY
Status: DISCONTINUED | OUTPATIENT
Start: 2018-02-14 | End: 2018-02-14 | Stop reason: HOSPADM

## 2018-02-13 RX ORDER — HYDROMORPHONE HYDROCHLORIDE 2 MG/ML
INJECTION, SOLUTION INTRAMUSCULAR; INTRAVENOUS; SUBCUTANEOUS AS NEEDED
Status: DISCONTINUED | OUTPATIENT
Start: 2018-02-13 | End: 2018-02-13 | Stop reason: HOSPADM

## 2018-02-13 RX ORDER — ACETAMINOPHEN 10 MG/ML
1000 INJECTION, SOLUTION INTRAVENOUS ONCE
Status: CANCELLED | OUTPATIENT
Start: 2018-02-13 | End: 2018-02-13

## 2018-02-13 RX ORDER — VANCOMYCIN HYDROCHLORIDE
1250 EVERY 12 HOURS
Status: COMPLETED | OUTPATIENT
Start: 2018-02-14 | End: 2018-02-14

## 2018-02-13 RX ORDER — METOPROLOL SUCCINATE 25 MG/1
25 TABLET, EXTENDED RELEASE ORAL
Status: DISCONTINUED | OUTPATIENT
Start: 2018-02-13 | End: 2018-02-14 | Stop reason: HOSPADM

## 2018-02-13 RX ORDER — HYDROCHLOROTHIAZIDE 25 MG/1
12.5 TABLET ORAL DAILY
Status: DISCONTINUED | OUTPATIENT
Start: 2018-02-14 | End: 2018-02-14 | Stop reason: HOSPADM

## 2018-02-13 RX ORDER — SODIUM CHLORIDE, SODIUM LACTATE, POTASSIUM CHLORIDE, CALCIUM CHLORIDE 600; 310; 30; 20 MG/100ML; MG/100ML; MG/100ML; MG/100ML
70 INJECTION, SOLUTION INTRAVENOUS CONTINUOUS
Status: DISCONTINUED | OUTPATIENT
Start: 2018-02-13 | End: 2018-02-14

## 2018-02-13 RX ORDER — NALOXONE HYDROCHLORIDE 0.4 MG/ML
0.4 INJECTION, SOLUTION INTRAMUSCULAR; INTRAVENOUS; SUBCUTANEOUS AS NEEDED
Status: DISCONTINUED | OUTPATIENT
Start: 2018-02-13 | End: 2018-02-14 | Stop reason: HOSPADM

## 2018-02-13 RX ORDER — HYDROMORPHONE HYDROCHLORIDE 2 MG/ML
0.5 INJECTION, SOLUTION INTRAMUSCULAR; INTRAVENOUS; SUBCUTANEOUS
Status: DISCONTINUED | OUTPATIENT
Start: 2018-02-13 | End: 2018-02-13 | Stop reason: HOSPADM

## 2018-02-13 RX ORDER — ONDANSETRON 2 MG/ML
INJECTION INTRAMUSCULAR; INTRAVENOUS AS NEEDED
Status: DISCONTINUED | OUTPATIENT
Start: 2018-02-13 | End: 2018-02-13 | Stop reason: HOSPADM

## 2018-02-13 RX ORDER — SODIUM CHLORIDE, SODIUM LACTATE, POTASSIUM CHLORIDE, CALCIUM CHLORIDE 600; 310; 30; 20 MG/100ML; MG/100ML; MG/100ML; MG/100ML
125 INJECTION, SOLUTION INTRAVENOUS CONTINUOUS
Status: DISCONTINUED | OUTPATIENT
Start: 2018-02-13 | End: 2018-02-14

## 2018-02-13 RX ORDER — PROPOFOL 10 MG/ML
INJECTION, EMULSION INTRAVENOUS AS NEEDED
Status: DISCONTINUED | OUTPATIENT
Start: 2018-02-13 | End: 2018-02-13 | Stop reason: HOSPADM

## 2018-02-13 RX ORDER — VANCOMYCIN HYDROCHLORIDE
1250 ONCE
Status: COMPLETED | OUTPATIENT
Start: 2018-02-13 | End: 2018-02-13

## 2018-02-13 RX ORDER — SODIUM CHLORIDE 0.9 % (FLUSH) 0.9 %
5-10 SYRINGE (ML) INJECTION EVERY 8 HOURS
Status: DISCONTINUED | OUTPATIENT
Start: 2018-02-13 | End: 2018-02-14 | Stop reason: HOSPADM

## 2018-02-13 RX ORDER — PHENYLEPHRINE HCL IN 0.9% NACL 1 MG/10 ML
SYRINGE (ML) INTRAVENOUS AS NEEDED
Status: DISCONTINUED | OUTPATIENT
Start: 2018-02-13 | End: 2018-02-13 | Stop reason: HOSPADM

## 2018-02-13 RX ORDER — FENTANYL CITRATE 50 UG/ML
INJECTION, SOLUTION INTRAMUSCULAR; INTRAVENOUS AS NEEDED
Status: DISCONTINUED | OUTPATIENT
Start: 2018-02-13 | End: 2018-02-13 | Stop reason: HOSPADM

## 2018-02-13 RX ORDER — GLYCOPYRROLATE 0.2 MG/ML
INJECTION INTRAMUSCULAR; INTRAVENOUS AS NEEDED
Status: DISCONTINUED | OUTPATIENT
Start: 2018-02-13 | End: 2018-02-13 | Stop reason: HOSPADM

## 2018-02-13 RX ORDER — ACETAMINOPHEN 10 MG/ML
INJECTION, SOLUTION INTRAVENOUS AS NEEDED
Status: DISCONTINUED | OUTPATIENT
Start: 2018-02-13 | End: 2018-02-13 | Stop reason: HOSPADM

## 2018-02-13 RX ORDER — MAGNESIUM SULFATE 100 %
4 CRYSTALS MISCELLANEOUS AS NEEDED
Status: DISCONTINUED | OUTPATIENT
Start: 2018-02-13 | End: 2018-02-13 | Stop reason: HOSPADM

## 2018-02-13 RX ORDER — MIDAZOLAM HYDROCHLORIDE 1 MG/ML
INJECTION, SOLUTION INTRAMUSCULAR; INTRAVENOUS AS NEEDED
Status: DISCONTINUED | OUTPATIENT
Start: 2018-02-13 | End: 2018-02-13 | Stop reason: HOSPADM

## 2018-02-13 RX ORDER — OXYCODONE AND ACETAMINOPHEN 5; 325 MG/1; MG/1
1-2 TABLET ORAL
Status: DISCONTINUED | OUTPATIENT
Start: 2018-02-13 | End: 2018-02-14 | Stop reason: HOSPADM

## 2018-02-13 RX ORDER — INSULIN LISPRO 100 [IU]/ML
INJECTION, SOLUTION INTRAVENOUS; SUBCUTANEOUS ONCE
Status: DISCONTINUED | OUTPATIENT
Start: 2018-02-13 | End: 2018-02-13 | Stop reason: HOSPADM

## 2018-02-13 RX ORDER — PANTOPRAZOLE SODIUM 40 MG/1
40 TABLET, DELAYED RELEASE ORAL DAILY
Status: DISCONTINUED | OUTPATIENT
Start: 2018-02-14 | End: 2018-02-14 | Stop reason: HOSPADM

## 2018-02-13 RX ORDER — ROSUVASTATIN CALCIUM 10 MG/1
40 TABLET, COATED ORAL DAILY
Status: DISCONTINUED | OUTPATIENT
Start: 2018-02-14 | End: 2018-02-14 | Stop reason: HOSPADM

## 2018-02-13 RX ORDER — DEXAMETHASONE SODIUM PHOSPHATE 4 MG/ML
INJECTION, SOLUTION INTRA-ARTICULAR; INTRALESIONAL; INTRAMUSCULAR; INTRAVENOUS; SOFT TISSUE AS NEEDED
Status: DISCONTINUED | OUTPATIENT
Start: 2018-02-13 | End: 2018-02-13 | Stop reason: HOSPADM

## 2018-02-13 RX ORDER — OXYCODONE AND ACETAMINOPHEN 5; 325 MG/1; MG/1
1 TABLET ORAL
Qty: 60 TAB | Refills: 0 | Status: ON HOLD | OUTPATIENT
Start: 2018-02-13 | End: 2018-05-28

## 2018-02-13 RX ORDER — CYCLOBENZAPRINE HCL 10 MG
10 TABLET ORAL
Qty: 60 TAB | Refills: 0 | Status: ON HOLD | OUTPATIENT
Start: 2018-02-13 | End: 2018-05-28

## 2018-02-13 RX ADMIN — SODIUM CHLORIDE, SODIUM LACTATE, POTASSIUM CHLORIDE, AND CALCIUM CHLORIDE: 600; 310; 30; 20 INJECTION, SOLUTION INTRAVENOUS at 14:11

## 2018-02-13 RX ADMIN — Medication 100 MCG: at 15:26

## 2018-02-13 RX ADMIN — FENTANYL CITRATE 100 MCG: 50 INJECTION, SOLUTION INTRAMUSCULAR; INTRAVENOUS at 13:47

## 2018-02-13 RX ADMIN — Medication 2 MG: at 15:30

## 2018-02-13 RX ADMIN — LIDOCAINE HYDROCHLORIDE 100 MG: 20 INJECTION, SOLUTION EPIDURAL; INFILTRATION; INTRACAUDAL; PERINEURAL at 13:56

## 2018-02-13 RX ADMIN — Medication 100 MCG: at 14:25

## 2018-02-13 RX ADMIN — GLYCOPYRROLATE 0.4 MG: 0.2 INJECTION INTRAMUSCULAR; INTRAVENOUS at 15:30

## 2018-02-13 RX ADMIN — SODIUM CHLORIDE, SODIUM LACTATE, POTASSIUM CHLORIDE, AND CALCIUM CHLORIDE 70 ML/HR: 600; 310; 30; 20 INJECTION, SOLUTION INTRAVENOUS at 18:35

## 2018-02-13 RX ADMIN — METOPROLOL SUCCINATE 25 MG: 25 TABLET, EXTENDED RELEASE ORAL at 22:47

## 2018-02-13 RX ADMIN — Medication 150 MCG: at 15:33

## 2018-02-13 RX ADMIN — VANCOMYCIN HYDROCHLORIDE 1250 MG: 10 INJECTION, POWDER, LYOPHILIZED, FOR SOLUTION INTRAVENOUS at 12:59

## 2018-02-13 RX ADMIN — ACETAMINOPHEN 1000 MG: 10 INJECTION, SOLUTION INTRAVENOUS at 14:05

## 2018-02-13 RX ADMIN — SODIUM CHLORIDE, SODIUM LACTATE, POTASSIUM CHLORIDE, AND CALCIUM CHLORIDE: 600; 310; 30; 20 INJECTION, SOLUTION INTRAVENOUS at 14:53

## 2018-02-13 RX ADMIN — PROPOFOL 200 MG: 10 INJECTION, EMULSION INTRAVENOUS at 13:56

## 2018-02-13 RX ADMIN — HYDROMORPHONE HYDROCHLORIDE 0.5 MG: 2 INJECTION, SOLUTION INTRAMUSCULAR; INTRAVENOUS; SUBCUTANEOUS at 14:19

## 2018-02-13 RX ADMIN — MIDAZOLAM HYDROCHLORIDE 2 MG: 1 INJECTION, SOLUTION INTRAMUSCULAR; INTRAVENOUS at 13:47

## 2018-02-13 RX ADMIN — ROCURONIUM BROMIDE 10 MG: 10 INJECTION, SOLUTION INTRAVENOUS at 14:50

## 2018-02-13 RX ADMIN — ROCURONIUM BROMIDE 50 MG: 10 INJECTION, SOLUTION INTRAVENOUS at 13:57

## 2018-02-13 RX ADMIN — ONDANSETRON 4 MG: 2 INJECTION INTRAMUSCULAR; INTRAVENOUS at 15:16

## 2018-02-13 RX ADMIN — HYDROMORPHONE HYDROCHLORIDE 0.5 MG: 2 INJECTION, SOLUTION INTRAMUSCULAR; INTRAVENOUS; SUBCUTANEOUS at 15:50

## 2018-02-13 RX ADMIN — DEXAMETHASONE SODIUM PHOSPHATE 4 MG: 4 INJECTION, SOLUTION INTRA-ARTICULAR; INTRALESIONAL; INTRAMUSCULAR; INTRAVENOUS; SOFT TISSUE at 14:22

## 2018-02-13 RX ADMIN — HYDROMORPHONE HYDROCHLORIDE: 10 INJECTION, SOLUTION INTRAMUSCULAR; INTRAVENOUS; SUBCUTANEOUS at 16:32

## 2018-02-13 RX ADMIN — SODIUM CHLORIDE, SODIUM LACTATE, POTASSIUM CHLORIDE, AND CALCIUM CHLORIDE 125 ML/HR: 600; 310; 30; 20 INJECTION, SOLUTION INTRAVENOUS at 12:07

## 2018-02-13 RX ADMIN — Medication 150 MCG: at 15:18

## 2018-02-13 RX ADMIN — Medication 100 MCG: at 14:44

## 2018-02-13 NOTE — IP AVS SNAPSHOT
[facility] 
 
 
 [address] [address] 
[phone] Patient: Bradley Acharya MRN: [id number] Phani Rangel About your hospitalization You were admitted on:  February 13, 2018 You last received care in the:  THE [facility] [address] You were discharged on:  February 14, 2018 Why you were hospitalized Your primary diagnosis was:  Cervical Spinal Stenosis Your diagnoses also included:  Cervical Spondylosis, Radiculopathy Affecting Upper Extremity Follow-up Information Follow up With Details Comments Contact Info Levi Freeman MD   [address] [place] [address] [place] [address] 
[phone] Poly Miller MD On 2/21/2018 Follow up appointment @ 9:45am [address] Orthopedic and [address] [address] [address] [address] 
[phone] Discharge Orders None A check ama indicates which time of day the medication should be taken. My Medications START taking these medications Instructions Each Dose to Equal  
 Morning Noon Evening Bedtime  
 cyclobenzaprine 10 mg tablet Commonly known as:  FLEXERIL Your last dose was: Your next dose is: Take 1 Tab by mouth three (3) times daily as needed for Muscle Spasm(s). 10 mg  
    
   
   
   
  
 oxyCODONE-acetaminophen 5-325 mg per tablet Commonly known as:  PERCOCET Your last dose was: Your next dose is: Take 1 Tab by mouth every four (4) hours as needed for Pain. Max Daily Amount: 6 Tabs. 1 Tab CONTINUE taking these medications Instructions Each Dose to Equal  
 Morning Noon Evening Bedtime CRESTOR 40 mg tablet Generic drug:  rosuvastatin Your last dose was: Your next dose is: Take 40 mg by mouth daily. Indications: hypercholesterolemia 40 mg  
    
   
   
   
  
 FIBER LAXATIVE PO Your last dose was: Your next dose is: Take 5 Caps by mouth two (2) times a day. 5 Cap  
    
   
   
   
  
 gabapentin 100 mg capsule Commonly known as:  NEURONTIN Your last dose was: Your next dose is: Take 100 mg by mouth daily. Indications: neck pain 100 mg  
    
   
   
   
  
 losartan-hydroCHLOROthiazide 100-12.5 mg per tablet Commonly known as:  HYZAAR Your last dose was: Your next dose is: Take 1 Tab by mouth daily. Indications: hypertension 1 Tab  
    
   
   
   
  
 metoprolol succinate 25 mg XL tablet Commonly known as:  TOPROL-XL Your last dose was: Your next dose is: Take 25 mg by mouth nightly. Indications: hypertension 25 mg  
    
   
   
   
  
 MULTI-VITAMIN PO Your last dose was: Your next dose is: Take  by mouth daily. NexIUM 20 mg capsule Generic drug:  esomeprazole Your last dose was: Your next dose is: Take 40 mg by mouth daily. Indications: gastroesophageal reflux disease 40 mg  
    
   
   
   
  
  
STOP taking these medications   
 methocarbamol 750 mg tablet Commonly known as:  ROBAXIN  
   
  
 traMADol 50 mg tablet Commonly known as:  ULTRAM  
   
  
  
  
Where to Get Your Medications Information on where to get these meds will be given to you by the nurse or doctor. ! Ask your nurse or doctor about these medications  
  cyclobenzaprine 10 mg tablet  
 oxyCODONE-acetaminophen 5-325 mg per tablet Discharge Instructions Dr. Myriam Ordonez Operative Instructions: Cervical Fusion *YOU MUST AVOID SMOKING OR BEING AROUND ANYONE WHO SMOKES. AVOID ALL PRODUCTS THAT CONTAIN NICOTINE. DO NOT TAKE IBUPROFEN OR ANTI--INFLAMMATORIES, AS THESE MAY ALTER THE HEALING OF THE FUSION. Diet: 1. Begin with liquids and light foods such as jell-o or soups 2.  Advance as tolerated to your regular diet if not nauseated. 3.  Swallowing difficulties may be experienced up to 2 weeks post-operatively. Modify food thickness as necessary. You may also obtain over-the-counter chloraseptic spray. Medications: 1. Strong oral narcotic pain medications have been prescribed for the first few days. Use only as directed. No pain medication is capable of taking away all the pain. Taking your pills at regular intervals will give you the best chance of having less pain. 2. If you need a refill PLEASE PLAN AHEAD. Call our office during regular hours (8-5). 3. Do not combine with alcoholic beverages. 4. Be careful as you walk, climb stairs or drive as mild dizziness is not unusual. 
5. Do not take medications that have not been prescribed by your surgeon. 6. You may switch to over the counter pain medication of your choice as you become more comfortable. Activity and Restrictions: 
1. You should not drive until you are clear by your surgeon at your post-operative visit. 2.  In regards to your cervical collar, you MUST wear this at all times until your first follow-up appointment. 3.  You should wear the collar even when sleeping. 4.  It can be removed for short periods of time as long as you are keeping your head centered over the shoulders without rotating or looking up or down. 5. You may take short walks inside and outside of your home and climb stairs. 6. You are to avoid work, housework, yard work, snow shoveling, lifting of more than a few pounds, overhead work or strenuous activity. 7. Avoid any excessive stretching or range-of-motion of the neck. Non-painful range-of-motion of the neck is allowed 8. Follow-up with Dr. Kayli Tolbert in 7-10 days. DRIVIN. You should not drive until after your follow-up appointment.   
2.  You can be in a vehicle for short distances, but if you travel any long distance, please stop about every 30 minutes and walk/stretch. 3.  You should NEVER drive while taking narcotic medication. BATHING and INCISION CARE: 
1. The incision may be tender to touch or feel numb: this is normal.  
2.  Keep the incision clean and dry no showering until your follow-up appointment. The incision will be closed with sutures under the skin and the skin will be glued. 3.  Do not apply any lotions, ointments or oils on the incision. 4.  If you notice any excessive swelling, redness, or persistent drainage around the incision, notify the office immediately. RETURN TO WORK : 
1. The decision to return to work will be determined on an individual basis. 2.  Many people who have a strenuous job (construction, heavy labor, etc) may need to be off work for up to 8 weeks. 3.  If you need a work note, please let us know as soon as possible, and not the same day you are planning to return to work. NUTRITION : 
1.  Good nutrition is an essential part of healing. 2.  You should eat a balanced diet each day, including fruits, vegetables, dairy products and protein. 3.  Remember to drink plenty of water. 4.  If you have not had a bowel movement within 3 days of surgery, you will need to use a laxative or suppository that can be obtained over-the-counter at your local pharmacy. BONE STIMULATOR: 
1. A spinal bone stimulator may be prescribed for you under certain situations. 2.  A nurse will call you if one has been requested and discuss its use for approximately 4-6 months post-op every day. 3.  This device assists in bone healing and fusion. CALL THE OFFICE: 
? If you have severe pain unrelieved by the medications, new numbness or tingling in your arms; 
? If you have a fever of 101.0°F or greater;  
? If you notice excessive swelling, redness, or persistent drainage from the incision or IV site;   
 
The [facility] office number is [phone] from 8:00am to 5:00pm Monday through Friday. After 5:00pm, on weekends, or holidays, please leave a message with our answering service and the doctor on-call will get back to you shortly. Introducing [facility]! Libia Barone introduces [employer] patient portal. Now you can access parts of your medical record, email your doctor's office, and request medication refills online. 1. In your internet browser, go to https://"[employer]. [email or web address]/"[employer] 2. Click on the First Time User? Click Here link in the Sign In box. You will see the New Member Sign Up page. 3. Enter your [employer] Access Code exactly as it appears below. You will not need to use this code after youve completed the sign-up process. If you do not sign up before the expiration date, you must request a new code. · [employer] Access Code: [email or web address] Expires: 4/8/2018 12:38 PM 
 
4. Enter the last four digits of your Social Security Number (xxxx) and Date of Birth (mm/dd/yyyy) as indicated and click Submit. You will be taken to the next sign-up page. 5. Create a [employer] ID. This will be your [employer] login ID and cannot be changed, so think of one that is secure and easy to remember. 6. Create a [employer] password. You can change your password at any time. 7. Enter your Password Reset Question and Answer. This can be used at a later time if you forget your password. 8. Enter your e-mail address. You will receive e-mail notification when new information is available in [address] 9. Click Sign Up. You can now view and download portions of your medical record. 10. Click the Download Summary menu link to download a portable copy of your medical information. If you have questions, please visit the Frequently Asked Questions section of the [employer] website. Remember, [employer] is NOT to be used for urgent needs. For medical emergencies, dial 911. Now available from your iPhone and Android! Providers Seen During Your Hospitalization Provider Specialty Primary office phone Hosea Sousa MD Orthopedic Surgery [phone] Your Primary Care Physician (PCP) Primary Care Physician Office Phone Office [facility] [phone] You are allergic to the following No active allergies Recent Documentation Height Weight BMI Smoking Status 1.727 m 83.3 kg 27.93 kg/m2 Former Smoker Emergency Contacts Name Discharge Info Relation Home Work Mobile SergeyJuli brown DISCHARGE CAREGIVER [3] Mother [14]   [phone] Epifanio Blount DISCHARGE CAREGIVER [3] Father [15] [phone] Patient Belongings The following personal items are in your possession at time of discharge: 
  Dental Appliances: None  Visual Aid: Glasses      Home Medications: Sent home   Jewelry: None  Clothing: At bedside    Other Valuables: Cell Phone  Personal Items Sent to Safe:  (none) Please provide this summary of care documentation to your next provider. Signatures-by signing, you are acknowledging that this After Visit Summary has been reviewed with you and you have received a copy. Patient Signature:  ____________________________________________________________ Date:  ____________________________________________________________  
  
Jamison Moritz Provider Signature:  ____________________________________________________________ Date:  ____________________________________________________________

## 2018-02-13 NOTE — PERIOP NOTES
Pt states he did the Bactroban ointment x5days per prescribed. He also did the 3 days shower at home per directed.

## 2018-02-13 NOTE — OP NOTES
OPERATIVE NOTE    Patient: Wanda Ty MRN: [id number]  SSN: [id number]    YOB: 1960  Age: 65 y.o. Sex: male      Indications: This is a 65y.o. year-old male who presents with neck pain. He was positive for stenosis per MRI. The patient was admitted for surgery as conservative measures have failed. Date of Procedure: 2/13/2018     Preoperative Diagnosis: SPONDYLOSIS WITH RADICULOPATHY CERVICAL REGION    Postoperative Diagnosis: SPONDYLOSIS WITH RADICULOPATHY CERVICAL REGION      Procedure: Procedure(s):  ANTERIOR CERVICAL DISCETOMY AND FUSION, CERVICAL 5-7 WITH C-ARM, **CONTACT ISOLATION**, \"SPEC POP\"     Surgeon:  Alicia Benito DO ,Surgical Assistant: Ari Phillips PA-C    Anesthesia: general    Estimated Blood Loss: 50 ml    Specimens: * No specimens in log *     Drains: tls    Implants:   Implant Name Type Inv. Item Serial No.  Lot No. LRB No. Used Action   DBM Form Small [id number]   SMQ--0006 SPINEFRONTIER   N/A 1 Implanted   DBM Pure Micro 2.5cc     \[facility] [place] [facility]\""--169 SPINEFRONTIER   N/A 1 Implanted       Complications: None; patient tolerated the procedure well. Procedure: The patient was greeted by Anesthesia and taken to the operative suite, where the patient underwent general endotracheal anesthesia. The patient was positioned in the supine positioned in the supine position on a standard radiolucent Tay spine table. The head was held in 5 pounds of traction through the mandible and the occiput, utilizing a Holter apparatus. A towel roll was placed between the patient's shoulders to allow gentle extension of the cervical spine, and the arms were held at the patient's side. There cervical spine was sterilely prepped and draped in a standard fashion. Fluoroscopy confirmed the Cervical level 5-7, and a 2inch incision was made over the left anterior cervical spine in line with the disk space. The platysma was transected and undermined.   The investing fascia over the medial border of the sternocleidomastoid muscle was sharply dissected. Blunt dissection was utilized to palpate the carotid pulsation and to dissect over the osteophytes of the cervical spine. The longus coli muscles were mobilized with electrocautery and retractors were positioned to protect the soft tissue and neurovascular structures. A needle was positioned in the Cervical 5-7disk space and confirmed fluoroscopically to be the appropriate level. A rongeur was utilized to remove all osteophytes over the anterior border of Cervical level 5-7. A complete diskectomy was performed at Cervical level 5-7for purposes of decompression, secondary to spinal stenosis, utilizing a combination of pituitary rongeurs, Kerrison rongeurs, and curettes. The curettes were utilized to completely remove all cartilage from the superior and the inferior endplate to expose the underlying endplate and create a bleeding surface. The posterior uncinate spurs were completely resect ed, as well as a complete resection of the posterior longitudinal ligament. At the completion of the decompression, a nerve hook could be passed out each neural foramen. There was no residual stenosis noted. The disk space had been squared off and rasped. Progressive trial spacers were positioned at each level for trial.  The most appropriate height and width cage was placed. Bone marrow aspirate was taken from the Cervical level 6 x5ml. This was mixed on the back table with Spine [email or web address] DBM Form demineralized bone matrix. This was allowed to sit in the bone marrow for approximately 10 minutes. An appropriate width and sized PEEK threaded machine interbody cage was chosen. The bone matrix was placed inside the PEEK cage, and subsequently impacted into the Cervical 5-7 disk space for disk height restoration, lordosis correction, and interbody fusion. X-rays confirmed excellent position of the interbody cage.   A 37mm Spine [place] [employer] Max anterior cervical locking plate was contoured to the cervical spine and locked into position with 6 locking screws in Cervical level 5-7. Xray's confirmed excellent position of the locking plate without abnormalities. All bleeding was cauterized with bipolar electrocautery and hemostatic agents. A deep TLS drain was placed. The platysma was re approximated with 2-0 Vicryl suture. The skin edges were re approximated with 2-0 Vicryl in subcutaneous fashion, and the skin was closed with a running 3-0 Monocryl in subcuticular fashion. A layer of Dermabond skin sealant was placed, as well as a soft sterile dressing and a hard cervical collar. The patient recovered from anesthesia and was transferred to the postanesthesia care unit in stable condition.       Jonny Watts MD  2/13/2018  3:28 PM

## 2018-02-13 NOTE — PERIOP NOTES
TRANSFER - OUT REPORT:    Verbal report given to Radha Linares RN on Crystal Herzog  being transferred to [address] [place] [address] ([facility]) for routine post - op       Report consisted of patients Situation, Background, Assessment and   Recommendations(SBAR). Information from the following report(s) SBAR, Intake/Output and MAR was reviewed with the receiving nurse. Lines:   Peripheral IV 02/13/18 Left Forearm (Active)   Site Assessment Clean, dry, & intact 2/13/2018  4:40 PM   Phlebitis Assessment 0 2/13/2018  4:40 PM   Infiltration Assessment 0 2/13/2018  4:40 PM   Dressing Status Clean, dry, & intact 2/13/2018  4:40 PM   Dressing Type Tape;Transparent 2/13/2018  4:40 PM   Hub Color/Line Status Infusing 2/13/2018  4:01 PM   Alcohol Cap Used No 2/13/2018 12:05 PM        Opportunity for questions and clarification was provided.       Patient transported with:   Registered Nurse

## 2018-02-13 NOTE — ANESTHESIA PREPROCEDURE EVALUATION
Anesthetic History   No history of anesthetic complications            Review of Systems / Medical History  Patient summary reviewed, nursing notes reviewed and pertinent labs reviewed    Pulmonary  Within defined limits                 Neuro/Psych   Within defined limits           Cardiovascular    Hypertension: well controlled              Exercise tolerance: >4 METS     GI/Hepatic/Renal     GERD: well controlled           Endo/Other        Arthritis     Other Findings            Physical Exam    Airway  Mallampati: II  TM Distance: 4 - 6 cm  Neck ROM: normal range of motion   Mouth opening: Normal     Cardiovascular    Rhythm: regular  Rate: normal         Dental  No notable dental hx       Pulmonary  Breath sounds clear to auscultation               Abdominal  GI exam deferred       Other Findings            Anesthetic Plan    ASA: 2  Anesthesia type: general          Induction: Intravenous  Anesthetic plan and risks discussed with: Patient

## 2018-02-13 NOTE — PERIOP NOTES
Bedside report to receiving nurse Tanja Arora, current vital signs noted below. Dressing dry and intact. Family in waiting room. No further questions from receiving nurse.   Visit Vitals    /65    Pulse 90    Temp 98.4 °F (36.9 °C)    Resp 13    Ht 5' 8\" (1.727 m)    Wt 83.3 kg (183 lb 11.2 oz)    SpO2 96%    BMI 27.93 kg/m2

## 2018-02-13 NOTE — PROGRESS NOTES
36  Received client from PACU in satisfactory condition. Client is a pt of Dr. Shaniqua Suresh. Pt had a C5-C7  ACDF. today. Client is A/O X 4. Client is calm and cooperative. Clients PERRLA. Pt  has numbness and tingling to  Fingers to both hands, more on left fingers. With + Radial,Posterior Tibial and Dorsalis Pedis pulses. Capillary Refill less than 3 seconds. Skin is warm , dry and skin color is appropriate to race. Client is negative for JVD. Bibasilar breath sounds clear. No use of accessory muscles. Bowel sounds hypoactive to all quadrants. Abdomen is soft and non-tender. Client has not voided post-operatively. No bladder distention evident. No complaints of bladder discomfort. Client has a gauze and tegaderm dressing to the anterior neck . Pt has Hard C collar on. Pt has TLS drain with serosanguinous drainage. . Dressing is dry and intact. No other skin integrity issues present. Lewis hose applied. Sequential compression device applied. Client has LFA 18 gauge PIV present and running LR at 70 ml/hr. Client has Dilaudid PCA withBolus and Basal dose. Clients pain is 0/10 on 0-10 scale. Client oriented to call bell use as well as bed use. Client oriented to phone and how to order meals. Call bell within reach. Bed in low position. Three side rails up.  6:36 PM   Ate dinner well without nausea or vomiting. 1900  Pt sleeping on and off but wakes up easily to verbal stimuli.

## 2018-02-13 NOTE — PERIOP NOTES
TRANSFER - IN REPORT:    Verbal report received from ORN & CRNA on Kristen Barron  being received from OR (unit) for routine post - op      Report consisted of patients Situation, Background, Assessment and   Recommendations(SBAR). Information from the following report(s) SBAR, Intake/Output and MAR was reviewed with the receiving nurse. Opportunity for questions and clarification was provided. Assessment completed upon patients arrival to unit and care assumed.

## 2018-02-13 NOTE — INTERVAL H&P NOTE
H&P Update:  Juliana Sever was seen and examined. History and physical has been reviewed. The patient has been examined. There have been no significant clinical changes since the completion of the originally dated History and Physical.  Patient identified by surgeon; surgical site was confirmed by patient and surgeon.     Signed By: Nicolasa Schilder, MD     February 13, 2018 7:31 AM

## 2018-02-13 NOTE — IP AVS SNAPSHOT
[address] [address] 
[phone] Patient: Aide Pedro MRN: [id number] Renaldo Shanks A check ama indicates which time of day the medication should be taken. My Medications START taking these medications Instructions Each Dose to Equal  
 Morning Noon Evening Bedtime  
 cyclobenzaprine 10 mg tablet Commonly known as:  FLEXERIL Your last dose was: Your next dose is: Take 1 Tab by mouth three (3) times daily as needed for Muscle Spasm(s). 10 mg  
    
   
   
   
  
 oxyCODONE-acetaminophen 5-325 mg per tablet Commonly known as:  PERCOCET Your last dose was: Your next dose is: Take 1 Tab by mouth every four (4) hours as needed for Pain. Max Daily Amount: 6 Tabs. 1 Tab CONTINUE taking these medications Instructions Each Dose to Equal  
 Morning Noon Evening Bedtime CRESTOR 40 mg tablet Generic drug:  rosuvastatin Your last dose was: Your next dose is: Take 40 mg by mouth daily. Indications: hypercholesterolemia 40 mg  
    
   
   
   
  
 FIBER LAXATIVE PO Your last dose was: Your next dose is: Take 5 Caps by mouth two (2) times a day. 5 Cap  
    
   
   
   
  
 gabapentin 100 mg capsule Commonly known as:  NEURONTIN Your last dose was: Your next dose is: Take 100 mg by mouth daily. Indications: neck pain 100 mg  
    
   
   
   
  
 losartan-hydroCHLOROthiazide 100-12.5 mg per tablet Commonly known as:  HYZAAR Your last dose was: Your next dose is: Take 1 Tab by mouth daily. Indications: hypertension 1 Tab  
    
   
   
   
  
 metoprolol succinate 25 mg XL tablet Commonly known as:  TOPROL-XL Your last dose was: Your next dose is: Take 25 mg by mouth nightly. Indications: hypertension 25 mg  
    
   
   
   
  
 MULTI-VITAMIN PO Your last dose was: Your next dose is: Take  by mouth daily. NexIUM 20 mg capsule Generic drug:  esomeprazole Your last dose was: Your next dose is: Take 40 mg by mouth daily. Indications: gastroesophageal reflux disease 40 mg  
    
   
   
   
  
  
STOP taking these medications   
 methocarbamol 750 mg tablet Commonly known as:  ROBAXIN  
   
  
 traMADol 50 mg tablet Commonly known as:  ULTRAM  
   
  
  
  
Where to Get Your Medications Information on where to get these meds will be given to you by the nurse or doctor. ! Ask your nurse or doctor about these medications  
  cyclobenzaprine 10 mg tablet  
 oxyCODONE-acetaminophen 5-325 mg per tablet

## 2018-02-13 NOTE — ANESTHESIA POSTPROCEDURE EVALUATION
Post-Anesthesia Evaluation and Assessment    Cardiovascular Function/Vital Signs  Visit Vitals    /66    Pulse 88    Temp 37.1 °C (98.7 °F)    Resp 14    Ht 5' 8\" (1.727 m)    Wt 83.3 kg (183 lb 11.2 oz)    SpO2 95%    BMI 27.93 kg/m2       Patient is status post Procedure(s):  ANTERIOR CERVICAL DISCETOMY AND FUSION, CERVICAL 5-7 WITH C-ARM, **CONTACT ISOLATION**, \"SPEC POP\" . Nausea/Vomiting: Controlled. Postoperative hydration reviewed and adequate. Pain:  Pain Scale 1: Numeric (0 - 10) (02/13/18 1641)  Pain Intensity 1: 3 (02/13/18 1641)   Managed. Neurological Status:   Neuro (WDL): Within Defined Limits (02/13/18 1624)   At baseline. Mental Status and Level of Consciousness: Arousable. Pulmonary Status:   O2 Device: Nasal cannula (02/13/18 1641)   Adequate oxygenation and airway patent. Complications related to anesthesia: None    Post-anesthesia assessment completed. No concerns. Patient has met all discharge requirements.     Signed By: Robert Browning CRNA    February 13, 2018

## 2018-02-13 NOTE — ROUTINE PROCESS
1727  TRANSFER - IN REPORT:    Verbal report received from [facility] RN(name) on Bradley Acharya  being received from [employer]) for routine post - op      Report consisted of patients Situation, Background, Assessment and   Recommendations(SBAR). Information from the following report(s) SBAR, Kardex and MAR was reviewed with the receiving nurse. Opportunity for questions and clarification was provided. Assessment completed upon patients arrival to unit and care assumed.

## 2018-02-14 VITALS
SYSTOLIC BLOOD PRESSURE: 140 MMHG | WEIGHT: 183.7 LBS | OXYGEN SATURATION: 99 % | HEART RATE: 65 BPM | TEMPERATURE: 98 F | DIASTOLIC BLOOD PRESSURE: 75 MMHG | BODY MASS INDEX: 27.84 KG/M2 | RESPIRATION RATE: 18 BRPM | HEIGHT: 68 IN

## 2018-02-14 PROCEDURE — 99218 HC RM OBSERVATION: CPT

## 2018-02-14 PROCEDURE — 74011250637 HC RX REV CODE- 250/637: Performed by: PHYSICIAN ASSISTANT

## 2018-02-14 PROCEDURE — 74011250636 HC RX REV CODE- 250/636

## 2018-02-14 PROCEDURE — 74011250636 HC RX REV CODE- 250/636: Performed by: ORTHOPAEDIC SURGERY

## 2018-02-14 RX ADMIN — OXYCODONE HYDROCHLORIDE AND ACETAMINOPHEN 1 TABLET: 5; 325 TABLET ORAL at 09:50

## 2018-02-14 RX ADMIN — LOSARTAN POTASSIUM 100 MG: 50 TABLET ORAL at 09:51

## 2018-02-14 RX ADMIN — PANTOPRAZOLE SODIUM 40 MG: 40 TABLET, DELAYED RELEASE ORAL at 09:51

## 2018-02-14 RX ADMIN — VANCOMYCIN HYDROCHLORIDE 1250 MG: 10 INJECTION, POWDER, LYOPHILIZED, FOR SOLUTION INTRAVENOUS at 01:49

## 2018-02-14 RX ADMIN — GABAPENTIN 100 MG: 100 CAPSULE ORAL at 09:51

## 2018-02-14 RX ADMIN — VANCOMYCIN HYDROCHLORIDE 1250 MG: 10 INJECTION, POWDER, LYOPHILIZED, FOR SOLUTION INTRAVENOUS at 12:15

## 2018-02-14 RX ADMIN — ROSUVASTATIN CALCIUM 40 MG: 10 TABLET, FILM COATED ORAL at 09:51

## 2018-02-14 RX ADMIN — OXYCODONE HYDROCHLORIDE AND ACETAMINOPHEN 2 TABLET: 5; 325 TABLET ORAL at 13:55

## 2018-02-14 RX ADMIN — HYDROCHLOROTHIAZIDE 12.5 MG: 25 TABLET ORAL at 09:51

## 2018-02-14 NOTE — PROGRESS NOTES
Chart reviewed, met with pt at bedside, mother in room. Pt plans discharge home with mother available as needed. No immediate needs identified, CM remains available. Care Management Interventions  PCP Verified by CM:  Yes  Transition of Care Consult (CM Consult): Discharge Planning  Confirm Follow Up Transport: Family  Plan discussed with Pt/Family/Caregiver: Yes  Discharge Location  Discharge Placement: Home with family assistance

## 2018-02-14 NOTE — DISCHARGE INSTRUCTIONS
Dr. Abdirashid Mittal Operative Instructions: Cervical Fusion    *YOU MUST AVOID SMOKING OR BEING AROUND ANYONE WHO SMOKES. AVOID ALL PRODUCTS THAT CONTAIN NICOTINE. DO NOT TAKE IBUPROFEN OR ANTI--INFLAMMATORIES, AS THESE MAY ALTER THE HEALING OF THE FUSION. Diet:   1. Begin with liquids and light foods such as jell-o or soups  2. Advance as tolerated to your regular diet if not nauseated. 3.  Swallowing difficulties may be experienced up to 2 weeks post-operatively. Modify food thickness as necessary. You may also obtain over-the-counter chloraseptic spray. Medications:  1. Strong oral narcotic pain medications have been prescribed for the first few days. Use only as directed. No pain medication is capable of taking away all the pain. Taking your pills at regular intervals will give you the best chance of having less pain. 2. If you need a refill PLEASE PLAN AHEAD. Call our office during regular hours (8-5). 3. Do not combine with alcoholic beverages. 4. Be careful as you walk, climb stairs or drive as mild dizziness is not unusual.  5. Do not take medications that have not been prescribed by your surgeon. 6. You may switch to over the counter pain medication of your choice as you become more comfortable. Activity and Restrictions:  1. You should not drive until you are clear by your surgeon at your post-operative visit. 2.  In regards to your cervical collar, you MUST wear this at all times until your first follow-up appointment. 3.  You should wear the collar even when sleeping. 4.  It can be removed for short periods of time as long as you are keeping your head centered over the shoulders without rotating or looking up or down. 5. You may take short walks inside and outside of your home and climb stairs. 6. You are to avoid work, housework, yard work, snow shoveling, lifting of more than a few pounds, overhead work or strenuous activity.   7. Avoid any excessive stretching or range-of-motion of the neck. Non-painful range-of-motion of the neck is allowed  8. Follow-up with Dr. Master Mcconnell in 7-10 days. DRIVIN. You should not drive until after your follow-up appointment. 2.  You can be in a vehicle for short distances, but if you travel any long distance, please stop about every 30 minutes and walk/stretch. 3.  You should NEVER drive while taking narcotic medication. BATHING and INCISION CARE:  1. The incision may be tender to touch or feel numb: this is normal.   2.  Keep the incision clean and dry no showering until your follow-up appointment. The incision will be closed with sutures under the skin and the skin will be glued. 3.  Do not apply any lotions, ointments or oils on the incision. 4.  If you notice any excessive swelling, redness, or persistent drainage around the incision, notify the office immediately. RETURN TO WORK :  1. The decision to return to work will be determined on an individual basis. 2.  Many people who have a strenuous job (construction, heavy labor, etc) may need to be off work for up to 8 weeks. 3.  If you need a work note, please let us know as soon as possible, and not the same day you are planning to return to work. NUTRITION :  1.  Good nutrition is an essential part of healing. 2.  You should eat a balanced diet each day, including fruits, vegetables, dairy products and protein. 3.  Remember to drink plenty of water. 4.  If you have not had a bowel movement within 3 days of surgery, you will need to use a laxative or suppository that can be obtained over-the-counter at your local pharmacy. BONE STIMULATOR:  1. A spinal bone stimulator may be prescribed for you under certain situations. 2.  A nurse will call you if one has been requested and discuss its use for approximately 4-6 months post-op every day. 3.  This device assists in bone healing and fusion.     CALL THE OFFICE:   If you have severe pain unrelieved by the medications, new numbness or tingling in your arms;   If you have a fever of 101.0°F or greater;    If you notice excessive swelling, redness, or persistent drainage from the incision or IV site; The [facility] office number is [phone] from 8:00am to 5:00pm Monday through Friday. After 5:00pm, on weekends, or holidays, please leave a message with our answering service and the doctor on-call will get back to you shortly.

## 2018-02-14 NOTE — PROGRESS NOTES
Progress Note      Patient: Mackenzie Norton               Sex: male          DOA: 2/13/2018         YOB: 1960      Age:  65 y.o.        LOS:  LOS: 0 days     Procedure: Procedure(s):  ANTERIOR CERVICAL DISCETOMY AND FUSION, CERVICAL 5-7 WITH C-ARM, **CONTACT ISOLATION**, \"SPEC POP\"             Subjective:     Mackenzie Norton is a 65 y.o. male who c/o stable, mild-to-moderate joint symptoms intermittently, reasonably well controlled by PRN meds      Objective:      Visit Vitals    /80 (BP 1 Location: Right arm, BP Patient Position: At rest)    Pulse 79    Temp 98.1 °F (36.7 °C)    Resp 16    Ht 5' 8\" (1.727 m)    Wt 83.3 kg (183 lb 11.2 oz)    SpO2 97%    BMI 27.93 kg/m2       Physical Exam:  A&0 x3  VSS  Triceps/Biceps/Extensors/Flexors 5/5 Bilateral  Mild dyspagia, negative dysphonia          Dressing: C/D/I    Intake and Output:  Current Shift:     Last three shifts:  02/12 1901 - 02/14 0700  In: 3997.3 [P.O.:710; I.V.:3287.3]  Out: 1911 [MHWMA:8379; Drains:36]    Drain Output:    Lab/Data Reviewed: All lab results for the last 24 hours reviewed.     Medications Reviewed    Continued hospitalization is indicated due to drain removal      Assessment/Plan     Principal Problem:    Cervical spinal stenosis (1/16/2018)    Active Problems:    Cervical spondylosis (1/16/2018)      Radiculopathy affecting upper extremity (1/16/2018)        S/p ACDF doing well  D/c O2, D/c drain, change dressing  D/c home this morning, D/c all lines prior to discharge  F/u in office 10 days    Home

## 2018-02-14 NOTE — PROGRESS NOTES
2005 - Assumed care at this time. 2241 - Patient in bed at this time. Patient A&Ox4, RA. Denies chest pain and SOB. 18G IV to left FA  intact and patent. SCD compression device and TEDS bilaterally. Gauze/tape dressing to anterior neck CDI. TLS draining and patent. Denies numbness/calf pain. Tingling in fingers of bilateral hands present prior to sx. Pain 6/10 with a tolerable level of 4/10, PCA encouraged. Pt educated on IS use, q2h rounds, pain management, and \"Up for Meals\". Pt verbalized understanding, no concerns voiced. Call bell within reach, bed in lowest position. 3283 - Patient ambulated OOB to BR with steady gait. CHG wipes completed. No concerns voiced. Call bell within reach, bed in lowest position. 1123 - Patient ambulated OOB to BR with steady gait. Pt up in chair. No concerns voiced. Call bell and telephone within reach.

## 2018-02-14 NOTE — ROUTINE PROCESS
Bedside and Verbal shift change report given to Quin Andrade RN by Risa Fine RN. Report included the following information SBAR, Kardex, OR Summary, Intake/Output and MAR.

## 2018-02-14 NOTE — ROUTINE PROCESS
Bedside and Verbal shift change report given to Saurav Baeza RN (oncoming nurse) by Tomas Kelley RN (offgoing nurse). Report included the following information SBAR, Kardex and MAR.

## 2018-02-14 NOTE — PROGRESS NOTES
Problem: Falls - Risk of  Goal: *Absence of Falls  Document Delia Fall Risk and appropriate interventions in the flowsheet.    Outcome: Progressing Towards Goal  Fall Risk Interventions:  Mobility Interventions: Patient to call before getting OOB    Mentation Interventions: Door open when patient unattended    Medication Interventions: Patient to call before getting OOB    Elimination Interventions: Call light in reach, Patient to call for help with toileting needs

## 2018-02-14 NOTE — PROGRESS NOTES
Oral and Written notification given to patient and/or caregiver informing them that they are currently an Outpatient receiving care in our facility. Outpatient services include Observation Services under [address] and [facility] requirements.

## 2018-05-28 ENCOUNTER — HOSPITAL ENCOUNTER (INPATIENT)
Age: 58
LOS: 4 days | Discharge: HOME | DRG: 392 | End: 2018-06-01
Attending: EMERGENCY MEDICINE | Admitting: HOSPITALIST
Payer: COMMERCIAL

## 2018-05-28 ENCOUNTER — APPOINTMENT (OUTPATIENT)
Dept: CT IMAGING | Age: 58
DRG: 392 | End: 2018-05-28
Attending: PHYSICIAN ASSISTANT
Payer: COMMERCIAL

## 2018-05-28 ENCOUNTER — APPOINTMENT (OUTPATIENT)
Dept: GENERAL RADIOLOGY | Age: 58
DRG: 392 | End: 2018-05-28
Attending: PHYSICIAN ASSISTANT
Payer: COMMERCIAL

## 2018-05-28 DIAGNOSIS — K52.9 NONINFECTIOUS GASTROENTERITIS, UNSPECIFIED TYPE: Primary | ICD-10-CM

## 2018-05-28 DIAGNOSIS — M54.10 RADICULOPATHY AFFECTING UPPER EXTREMITY: ICD-10-CM

## 2018-05-28 DIAGNOSIS — A41.9 SEPSIS, DUE TO UNSPECIFIED ORGANISM: ICD-10-CM

## 2018-05-28 DIAGNOSIS — K52.9 COLITIS: ICD-10-CM

## 2018-05-28 PROBLEM — K21.9 GERD (GASTROESOPHAGEAL REFLUX DISEASE): Status: ACTIVE | Noted: 2018-05-28

## 2018-05-28 PROBLEM — E78.00 HYPERCHOLESTEREMIA: Status: ACTIVE | Noted: 2018-05-28

## 2018-05-28 PROBLEM — E87.6 HYPOKALEMIA: Status: ACTIVE | Noted: 2018-05-28

## 2018-05-28 PROBLEM — I10 HTN (HYPERTENSION): Status: ACTIVE | Noted: 2018-05-28

## 2018-05-28 PROBLEM — R68.89 PROBABLE SEPSIS: Status: ACTIVE | Noted: 2018-05-28

## 2018-05-28 LAB
ALBUMIN SERPL-MCNC: 3.8 G/DL (ref 3.4–5)
ALBUMIN/GLOB SERPL: 1.1 {RATIO} (ref 0.8–1.7)
ALP SERPL-CCNC: 72 U/L (ref 45–117)
ALT SERPL-CCNC: 57 U/L (ref 16–61)
AMPHET UR QL SCN: NEGATIVE
ANION GAP SERPL CALC-SCNC: 12 MMOL/L (ref 3–18)
APPEARANCE UR: CLEAR
AST SERPL-CCNC: 28 U/L (ref 15–37)
BACTERIA URNS QL MICRO: NEGATIVE /HPF
BARBITURATES UR QL SCN: NEGATIVE
BASOPHILS # BLD: 0 K/UL (ref 0–0.1)
BASOPHILS NFR BLD: 0 % (ref 0–3)
BENZODIAZ UR QL: NEGATIVE
BILIRUB SERPL-MCNC: 0.6 MG/DL (ref 0.2–1)
BILIRUB UR QL: NEGATIVE
BUN SERPL-MCNC: 8 MG/DL (ref 7–18)
BUN/CREAT SERPL: 9 (ref 12–20)
CALCIUM SERPL-MCNC: 8.9 MG/DL (ref 8.5–10.1)
CANNABINOIDS UR QL SCN: NEGATIVE
CHLORIDE SERPL-SCNC: 100 MMOL/L (ref 100–108)
CO2 SERPL-SCNC: 27 MMOL/L (ref 21–32)
COCAINE UR QL SCN: NEGATIVE
COLOR UR: YELLOW
CREAT SERPL-MCNC: 0.86 MG/DL (ref 0.6–1.3)
DIFFERENTIAL METHOD BLD: ABNORMAL
EOSINOPHIL # BLD: 0 K/UL (ref 0–0.4)
EOSINOPHIL NFR BLD: 0 % (ref 0–5)
EPITH CASTS URNS QL MICRO: NORMAL /LPF (ref 0–5)
ERYTHROCYTE [DISTWIDTH] IN BLOOD BY AUTOMATED COUNT: 14.4 % (ref 11.6–14.5)
GLOBULIN SER CALC-MCNC: 3.6 G/DL (ref 2–4)
GLUCOSE SERPL-MCNC: 97 MG/DL (ref 74–99)
GLUCOSE UR STRIP.AUTO-MCNC: NEGATIVE MG/DL
HCT VFR BLD AUTO: 39.9 % (ref 36–48)
HDSCOM,HDSCOM: NORMAL
HGB BLD-MCNC: 13.9 G/DL (ref 13–16)
HGB UR QL STRIP: ABNORMAL
KETONES UR QL STRIP.AUTO: NEGATIVE MG/DL
LACTATE SERPL-SCNC: 1.8 MMOL/L (ref 0.4–2)
LACTATE SERPL-SCNC: 2.6 MMOL/L (ref 0.4–2)
LEUKOCYTE ESTERASE UR QL STRIP.AUTO: ABNORMAL
LYMPHOCYTES # BLD: 5.5 K/UL (ref 0.8–3.5)
LYMPHOCYTES NFR BLD: 17 % (ref 20–51)
MCH RBC QN AUTO: 31.8 PG (ref 24–34)
MCHC RBC AUTO-ENTMCNC: 34.8 G/DL (ref 31–37)
MCV RBC AUTO: 91.3 FL (ref 74–97)
METHADONE UR QL: NEGATIVE
MONOCYTES # BLD: 2.6 K/UL (ref 0–1)
MONOCYTES NFR BLD: 8 % (ref 2–9)
NEUTS BAND NFR BLD MANUAL: 1 % (ref 0–5)
NEUTS SEG # BLD: 23.9 K/UL (ref 1.8–8)
NEUTS SEG NFR BLD: 74 % (ref 42–75)
NITRITE UR QL STRIP.AUTO: NEGATIVE
OPIATES UR QL: NEGATIVE
PCP UR QL: NEGATIVE
PH UR STRIP: 5.5 [PH] (ref 5–8)
PLATELET # BLD AUTO: 514 K/UL (ref 135–420)
PMV BLD AUTO: 9.2 FL (ref 9.2–11.8)
POTASSIUM SERPL-SCNC: 3.4 MMOL/L (ref 3.5–5.5)
PROT SERPL-MCNC: 7.4 G/DL (ref 6.4–8.2)
PROT UR STRIP-MCNC: NEGATIVE MG/DL
RBC # BLD AUTO: 4.37 M/UL (ref 4.7–5.5)
RBC #/AREA URNS HPF: NORMAL /HPF (ref 0–5)
RBC MORPH BLD: ABNORMAL
SODIUM SERPL-SCNC: 139 MMOL/L (ref 136–145)
SP GR UR REFRACTOMETRY: 1.01 (ref 1–1.03)
UROBILINOGEN UR QL STRIP.AUTO: 0.2 EU/DL (ref 0.2–1)
WBC # BLD AUTO: 32.3 K/UL (ref 4.6–13.2)
WBC URNS QL MICRO: NORMAL /HPF (ref 0–5)

## 2018-05-28 PROCEDURE — 74011250637 HC RX REV CODE- 250/637: Performed by: HOSPITALIST

## 2018-05-28 PROCEDURE — 96365 THER/PROPH/DIAG IV INF INIT: CPT

## 2018-05-28 PROCEDURE — 81001 URINALYSIS AUTO W/SCOPE: CPT | Performed by: PHYSICIAN ASSISTANT

## 2018-05-28 PROCEDURE — 74177 CT ABD & PELVIS W/CONTRAST: CPT

## 2018-05-28 PROCEDURE — 83605 ASSAY OF LACTIC ACID: CPT | Performed by: PHYSICIAN ASSISTANT

## 2018-05-28 PROCEDURE — 87086 URINE CULTURE/COLONY COUNT: CPT | Performed by: PHYSICIAN ASSISTANT

## 2018-05-28 PROCEDURE — 87040 BLOOD CULTURE FOR BACTERIA: CPT | Performed by: PHYSICIAN ASSISTANT

## 2018-05-28 PROCEDURE — 99284 EMERGENCY DEPT VISIT MOD MDM: CPT

## 2018-05-28 PROCEDURE — 85025 COMPLETE CBC W/AUTO DIFF WBC: CPT | Performed by: PHYSICIAN ASSISTANT

## 2018-05-28 PROCEDURE — 80307 DRUG TEST PRSMV CHEM ANLYZR: CPT | Performed by: HOSPITALIST

## 2018-05-28 PROCEDURE — 74011250636 HC RX REV CODE- 250/636: Performed by: HOSPITALIST

## 2018-05-28 PROCEDURE — 96375 TX/PRO/DX INJ NEW DRUG ADDON: CPT

## 2018-05-28 PROCEDURE — 74011250636 HC RX REV CODE- 250/636: Performed by: PHYSICIAN ASSISTANT

## 2018-05-28 PROCEDURE — 74011000250 HC RX REV CODE- 250: Performed by: PHYSICIAN ASSISTANT

## 2018-05-28 PROCEDURE — 80053 COMPREHEN METABOLIC PANEL: CPT | Performed by: PHYSICIAN ASSISTANT

## 2018-05-28 PROCEDURE — 71045 X-RAY EXAM CHEST 1 VIEW: CPT

## 2018-05-28 PROCEDURE — 74011636320 HC RX REV CODE- 636/320: Performed by: EMERGENCY MEDICINE

## 2018-05-28 PROCEDURE — 74011000258 HC RX REV CODE- 258: Performed by: PHYSICIAN ASSISTANT

## 2018-05-28 PROCEDURE — 74011250637 HC RX REV CODE- 250/637: Performed by: INTERNAL MEDICINE

## 2018-05-28 PROCEDURE — 65270000029 HC RM PRIVATE

## 2018-05-28 RX ORDER — CYCLOBENZAPRINE HCL 10 MG
10 TABLET ORAL
Status: DISCONTINUED | OUTPATIENT
Start: 2018-05-28 | End: 2018-05-30

## 2018-05-28 RX ORDER — ASPIRIN 81 MG/1
81 TABLET ORAL DAILY
Status: DISCONTINUED | OUTPATIENT
Start: 2018-05-29 | End: 2018-06-01 | Stop reason: HOSPADM

## 2018-05-28 RX ORDER — CIPROFLOXACIN 2 MG/ML
400 INJECTION, SOLUTION INTRAVENOUS EVERY 12 HOURS
Status: DISCONTINUED | OUTPATIENT
Start: 2018-05-28 | End: 2018-06-01 | Stop reason: HOSPADM

## 2018-05-28 RX ORDER — ONDANSETRON 2 MG/ML
4 INJECTION INTRAMUSCULAR; INTRAVENOUS
Status: DISCONTINUED | OUTPATIENT
Start: 2018-05-28 | End: 2018-06-01 | Stop reason: HOSPADM

## 2018-05-28 RX ORDER — BUTORPHANOL TARTRATE 2 MG/ML
0.5 INJECTION INTRAMUSCULAR; INTRAVENOUS
Status: COMPLETED | OUTPATIENT
Start: 2018-05-28 | End: 2018-05-28

## 2018-05-28 RX ORDER — NALOXONE HYDROCHLORIDE 0.4 MG/ML
0.4 INJECTION, SOLUTION INTRAMUSCULAR; INTRAVENOUS; SUBCUTANEOUS AS NEEDED
Status: DISCONTINUED | OUTPATIENT
Start: 2018-05-28 | End: 2018-06-01 | Stop reason: HOSPADM

## 2018-05-28 RX ORDER — OXYCODONE AND ACETAMINOPHEN 5; 325 MG/1; MG/1
1 TABLET ORAL
Status: DISCONTINUED | OUTPATIENT
Start: 2018-05-28 | End: 2018-05-28

## 2018-05-28 RX ORDER — PANTOPRAZOLE SODIUM 40 MG/1
40 TABLET, DELAYED RELEASE ORAL DAILY
Status: DISCONTINUED | OUTPATIENT
Start: 2018-05-29 | End: 2018-06-01 | Stop reason: HOSPADM

## 2018-05-28 RX ORDER — SODIUM CHLORIDE 0.9 % (FLUSH) 0.9 %
5-10 SYRINGE (ML) INJECTION AS NEEDED
Status: DISCONTINUED | OUTPATIENT
Start: 2018-05-28 | End: 2018-06-01 | Stop reason: HOSPADM

## 2018-05-28 RX ORDER — ROSUVASTATIN CALCIUM 10 MG/1
40 TABLET, COATED ORAL DAILY
Status: DISCONTINUED | OUTPATIENT
Start: 2018-05-29 | End: 2018-06-01 | Stop reason: HOSPADM

## 2018-05-28 RX ORDER — SAME BUTANEDISULFONATE/BETAINE 400-600 MG
500 POWDER IN PACKET (EA) ORAL 2 TIMES DAILY
Status: DISCONTINUED | OUTPATIENT
Start: 2018-05-28 | End: 2018-06-01 | Stop reason: HOSPADM

## 2018-05-28 RX ORDER — SODIUM CHLORIDE 9 MG/ML
75 INJECTION, SOLUTION INTRAVENOUS CONTINUOUS
Status: DISPENSED | OUTPATIENT
Start: 2018-05-28 | End: 2018-05-29

## 2018-05-28 RX ORDER — METRONIDAZOLE 500 MG/100ML
500 INJECTION, SOLUTION INTRAVENOUS EVERY 8 HOURS
Status: DISCONTINUED | OUTPATIENT
Start: 2018-05-29 | End: 2018-06-01 | Stop reason: HOSPADM

## 2018-05-28 RX ORDER — ONDANSETRON 2 MG/ML
4 INJECTION INTRAMUSCULAR; INTRAVENOUS
Status: COMPLETED | OUTPATIENT
Start: 2018-05-28 | End: 2018-05-28

## 2018-05-28 RX ORDER — GABAPENTIN 100 MG/1
100 CAPSULE ORAL DAILY
Status: DISCONTINUED | OUTPATIENT
Start: 2018-05-29 | End: 2018-05-29

## 2018-05-28 RX ORDER — ACETAMINOPHEN 500 MG
1000 TABLET ORAL
Status: DISCONTINUED | OUTPATIENT
Start: 2018-05-28 | End: 2018-05-28

## 2018-05-28 RX ORDER — METOPROLOL SUCCINATE 25 MG/1
25 TABLET, EXTENDED RELEASE ORAL
Status: DISCONTINUED | OUTPATIENT
Start: 2018-05-28 | End: 2018-06-01 | Stop reason: HOSPADM

## 2018-05-28 RX ORDER — METRONIDAZOLE 500 MG/100ML
500 INJECTION, SOLUTION INTRAVENOUS
Status: COMPLETED | OUTPATIENT
Start: 2018-05-28 | End: 2018-05-31

## 2018-05-28 RX ORDER — HYDROCHLOROTHIAZIDE 25 MG/1
12.5 TABLET ORAL DAILY
Status: DISCONTINUED | OUTPATIENT
Start: 2018-05-29 | End: 2018-06-01 | Stop reason: HOSPADM

## 2018-05-28 RX ORDER — LOSARTAN POTASSIUM AND HYDROCHLOROTHIAZIDE 12.5; 1 MG/1; MG/1
1 TABLET ORAL DAILY
Status: DISCONTINUED | OUTPATIENT
Start: 2018-05-29 | End: 2018-05-28 | Stop reason: RX

## 2018-05-28 RX ORDER — ACETAMINOPHEN 10 MG/ML
1000 INJECTION, SOLUTION INTRAVENOUS ONCE
Status: COMPLETED | OUTPATIENT
Start: 2018-05-28 | End: 2018-05-31

## 2018-05-28 RX ORDER — POTASSIUM CHLORIDE 20 MEQ/1
40 TABLET, EXTENDED RELEASE ORAL
Status: COMPLETED | OUTPATIENT
Start: 2018-05-28 | End: 2018-05-28

## 2018-05-28 RX ORDER — OXYCODONE AND ACETAMINOPHEN 5; 325 MG/1; MG/1
1-2 TABLET ORAL
Status: DISCONTINUED | OUTPATIENT
Start: 2018-05-28 | End: 2018-06-01 | Stop reason: HOSPADM

## 2018-05-28 RX ORDER — LOSARTAN POTASSIUM 50 MG/1
100 TABLET ORAL DAILY
Status: DISCONTINUED | OUTPATIENT
Start: 2018-05-29 | End: 2018-06-01 | Stop reason: HOSPADM

## 2018-05-28 RX ORDER — DIPHENHYDRAMINE HYDROCHLORIDE 50 MG/ML
12.5 INJECTION, SOLUTION INTRAMUSCULAR; INTRAVENOUS
Status: DISCONTINUED | OUTPATIENT
Start: 2018-05-28 | End: 2018-06-01 | Stop reason: HOSPADM

## 2018-05-28 RX ORDER — HEPARIN SODIUM 5000 [USP'U]/ML
5000 INJECTION, SOLUTION INTRAVENOUS; SUBCUTANEOUS EVERY 8 HOURS
Status: DISCONTINUED | OUTPATIENT
Start: 2018-05-28 | End: 2018-06-01 | Stop reason: HOSPADM

## 2018-05-28 RX ORDER — ASPIRIN 81 MG/1
TABLET ORAL DAILY
COMMUNITY

## 2018-05-28 RX ORDER — ACETAMINOPHEN 325 MG/1
650 TABLET ORAL
Status: DISCONTINUED | OUTPATIENT
Start: 2018-05-28 | End: 2018-06-01 | Stop reason: HOSPADM

## 2018-05-28 RX ADMIN — ONDANSETRON 4 MG: 2 INJECTION INTRAMUSCULAR; INTRAVENOUS at 14:55

## 2018-05-28 RX ADMIN — METRONIDAZOLE 500 MG: 500 INJECTION, SOLUTION INTRAVENOUS at 16:16

## 2018-05-28 RX ADMIN — Medication 500 MG: at 21:28

## 2018-05-28 RX ADMIN — BUTORPHANOL TARTRATE 0.5 MG: 2 INJECTION, SOLUTION INTRAMUSCULAR; INTRAVENOUS at 14:59

## 2018-05-28 RX ADMIN — OXYCODONE HYDROCHLORIDE AND ACETAMINOPHEN 2 TABLET: 5; 325 TABLET ORAL at 21:28

## 2018-05-28 RX ADMIN — PIPERACILLIN SODIUM,TAZOBACTAM SODIUM 3.38 G: 3; .375 INJECTION, POWDER, FOR SOLUTION INTRAVENOUS at 15:06

## 2018-05-28 RX ADMIN — ACETAMINOPHEN 1000 MG: 10 INJECTION, SOLUTION INTRAVENOUS at 16:41

## 2018-05-28 RX ADMIN — HEPARIN SODIUM 5000 UNITS: 5000 INJECTION, SOLUTION INTRAVENOUS; SUBCUTANEOUS at 18:13

## 2018-05-28 RX ADMIN — SODIUM CHLORIDE 1000 ML: 900 INJECTION, SOLUTION INTRAVENOUS at 14:53

## 2018-05-28 RX ADMIN — SODIUM CHLORIDE 490 ML: 900 INJECTION, SOLUTION INTRAVENOUS at 17:00

## 2018-05-28 RX ADMIN — SODIUM CHLORIDE 75 ML/HR: 900 INJECTION, SOLUTION INTRAVENOUS at 18:13

## 2018-05-28 RX ADMIN — POTASSIUM CHLORIDE 40 MEQ: 20 TABLET, EXTENDED RELEASE ORAL at 19:00

## 2018-05-28 RX ADMIN — BUTORPHANOL TARTRATE 0.5 MG: 2 INJECTION, SOLUTION INTRAMUSCULAR; INTRAVENOUS at 16:35

## 2018-05-28 RX ADMIN — CIPROFLOXACIN 400 MG: 2 INJECTION, SOLUTION INTRAVENOUS at 18:12

## 2018-05-28 RX ADMIN — IOPAMIDOL 100 ML: 612 INJECTION, SOLUTION INTRAVENOUS at 15:39

## 2018-05-28 RX ADMIN — OXYCODONE HYDROCHLORIDE AND ACETAMINOPHEN 1 TABLET: 5; 325 TABLET ORAL at 18:12

## 2018-05-28 NOTE — ED NOTES
Family aware of precautions, remain at bedside, bed in low position, call bell within reach, side rails up

## 2018-05-28 NOTE — IP AVS SNAPSHOT
[address] [address] 
[phone] Patient: Cecilia Sims MRN: [id number] Pedro Danish A check maa indicates which time of day the medication should be taken. My Medications START taking these medications Instructions Each Dose to Equal  
 Morning Noon Evening Bedtime  
 ciprofloxacin HCl 500 mg tablet Commonly known as:  CIPRO Your last dose was: Your next dose is: Take 1 Tab by mouth two (2) times a day for 10 days. 500 mg  
    
   
   
   
  
 metroNIDAZOLE 500 mg tablet Commonly known as:  FLAGYL Your last dose was: Your next dose is: Take 1 Tab by mouth three (3) times daily for 10 days. 500 mg Saccharomyces boulardii 250 mg capsule Commonly known as:  [employer] Your last dose was: Your next dose is: Take 2 Caps by mouth two (2) times a day for 12 days. 500 mg CHANGE how you take these medications Instructions Each Dose to Equal  
 Morning Noon Evening Bedtime  
 oxyCODONE-acetaminophen 5-325 mg per tablet Commonly known as:  PERCOCET What changed:  reasons to take this Your last dose was: Your next dose is: Take 1 Tab by mouth every four (4) hours as needed. Max Daily Amount: 6 Tabs. 1 Tab CONTINUE taking these medications Instructions Each Dose to Equal  
 Morning Noon Evening Bedtime  
 aspirin delayed-release 81 mg tablet Your last dose was: Your next dose is: Take  by mouth daily. CRESTOR 40 mg tablet Generic drug:  rosuvastatin Your last dose was: Your next dose is: Take 40 mg by mouth daily. Indications: hypercholesterolemia 40 mg  
    
   
   
   
  
 FIBER LAXATIVE PO Your last dose was: Your next dose is: Take 5 Caps by mouth two (2) times a day. 5 Cap  
    
   
   
   
  
 losartan-hydroCHLOROthiazide 100-12.5 mg per tablet Commonly known as:  HYZAAR Your last dose was: Your next dose is: Take 1 Tab by mouth daily. Indications: hypertension 1 Tab  
    
   
   
   
  
 metoprolol succinate 25 mg XL tablet Commonly known as:  TOPROL-XL Your last dose was: Your next dose is: Take 25 mg by mouth nightly. Indications: hypertension 25 mg  
    
   
   
   
  
 MULTI-VITAMIN PO Your last dose was: Your next dose is: Take  by mouth daily. NexIUM 20 mg capsule Generic drug:  esomeprazole Your last dose was: Your next dose is: Take 40 mg by mouth daily. Indications: gastroesophageal reflux disease 40 mg Where to Get Your Medications These medications were sent to [address] [place] [address] [address]  [address] [employer] [address] Hours:  M-F 930am-6pm Phone:  [phone]  
  ciprofloxacin HCl 500 mg tablet  
 metroNIDAZOLE 500 mg tablet Saccharomyces boulardii 250 mg capsule Information on where to get these meds will be given to you by the nurse or doctor. ! Ask your nurse or doctor about these medications  
  oxyCODONE-acetaminophen 5-325 mg per tablet

## 2018-05-28 NOTE — ED PROVIDER NOTES
[address]  EMERGENCY DEPARTMENT HISTORY AND PHYSICAL EXAM    Date: 5/28/2018  Patient Name: Maria Alejandra Beltran  YOB: 1960  Medical Record Number: [id number]     History of Presenting Illness     Chief Complaint   Patient presents with    Abdominal Pain    Fever       History Provided By: Patient    Chief Complaint: Abdominal pain  Duration: 10 Hours  Timing:  Acute  Location: Lower abdomen  Quality: Sharp  Severity: 8 out of 10  Modifying Factors: No relieving or worsening factors   Associated Symptoms: Fever (101.8 F in ED), nausea, and constipation    Additional History (Context):   1:59 PM  Maria Alejandra Beltran is a 65 y.o. male with PMHX HLD, HTN, GERD, Non-hodgkin's CA (in remission), & Meniere dz (left), who presents to the emergency department C/O sharp lower abdominal pain x 10 hours. Associated symptoms include fever (101.8 F in ED), nausea, and constipation. Reports hx of perforated bowel 10 years ago and states pain feels similar. Pt notes he was seen by [facility] for sore throat 15 days ago, had WBC of 24, was dx with PNA, and was rx Clarithromycin initially, was switched to 7-day course of Levaquin after following up with his PCP, which he finished 5 days ago, and then began taking his Clarithromycin again, which he would have finished today, but has not taken his last dose. PSHx of appy, partial colectomy, splenectomy, hernia remair. NKDA. Pt denies sore throat, cough, CP, diarrhea, dysuria, urinary frequency, urinary urgency, hematuria, urine decreased, difficulty urinating, and any other Sx or complaints.       Shx: former tobacco use, +EtOH use, -illicit drug use    PCP: Antonio Sharam III, MD    Current Facility-Administered Medications   Medication Dose Route Frequency Provider Last Rate Last Dose    sodium chloride (NS) flush 5-10 mL  5-10 mL IntraVENous PRN MCKENNA Alcantara        0.9% sodium chloride infusion  75 mL/hr IntraVENous EVER Bello MD 75 mL/hr at 05/28/18 1813 75 mL/hr at 05/28/18 1813    [START ON 5/29/2018] metroNIDAZOLE (FLAGYL) IVPB premix 500 mg  500 mg IntraVENous Q8H Nery Galeana MD        ciprofloxacin (CIPRO) 400 mg IVPB (premix)  400 mg IntraVENous Q12H Nery Galeana  mL/hr at 05/28/18 1812 400 mg at 05/28/18 1812    metoprolol succinate (TOPROL-XL) XL tablet 25 mg  25 mg Oral QHS Nery Galeana MD        [START ON 5/29/2018] aspirin delayed-release tablet 81 mg  81 mg Oral DAILY Nery Galeana MD        cyclobenzaprine (FLEXERIL) tablet 10 mg  10 mg Oral TID PRN Nery Galeana MD        oxyCODONE-acetaminophen (PERCOCET) 5-325 mg per tablet 1 Tab  1 Tab Oral Q4H PRN Nery Galeana MD   1 Tab at 05/28/18 1812    [START ON 5/29/2018] rosuvastatin (CRESTOR) tablet 40 mg  40 mg Oral DAILY Nery Galeana MD        [START ON 5/29/2018] gabapentin (NEURONTIN) capsule 100 mg  100 mg Oral DAILY Nery Galeana MD        Vetisha Lemon ON 5/29/2018] pantoprazole (PROTONIX) tablet 40 mg  40 mg Oral DAILY Nery Galeana MD        Saccharomyces boulardii (FLORASTOR) capsule 500 mg  500 mg Oral BID Nery Galeana MD        acetaminophen (TYLENOL) tablet 650 mg  650 mg Oral Q4H PRN Nery Galeana MD        [facility]) injection 0.4 mg  0.4 mg IntraVENous PRN Nery Galeana MD        diphenhydrAMINE (BENADRYL) injection 12.5 mg  12.5 mg IntraVENous Q4H PRN Nery Galeana MD        ondansetron [facility]) injection 4 mg  4 mg IntraVENous Q4H PRN Nery Galeana MD        heparin (porcine) injection 5,000 Units  5,000 Units SubCUTAneous Q8H Nery Galeana MD   5,000 Units at 05/28/18 1813    [START ON 5/29/2018] losartan (COZAAR) tablet 100 mg  100 mg Oral DAILY Nery Galeana MD        Vearl Lemon ON 5/29/2018] hydroCHLOROthiazide (HYDRODIURIL) tablet 12.5 mg  12.5 mg Oral DAILY Nery Galeana MD           Past History     Past Medical History:  Past Medical History:   Diagnosis Date    Acid reflux     Arthritis     Cancer ([facility] [place] [address] 1983    non Hodgkin's     GERD (gastroesophageal reflux disease)     Hypercholesteremia     Hypertension 01/29/2018 BP spiked when administered anesthesia for scheduled surgery. on 1-    Ill-defined condition     menieres    Meniere disease, left     Non-Hodgkin lymphoma ([facility] [address]        Past Surgical History:  Past Surgical History:   Procedure Laterality Date    ABDOMEN SURGERY PROC UNLISTED      spleenectomy, laporotomy    HX APPENDECTOMY      HX HERNIA REPAIR Bilateral     HX ORTHOPAEDIC Left     wrist pinning    HX ROTATOR CUFF REPAIR Bilateral     HX TONSILLECTOMY         Family History:  History reviewed. No pertinent family history. Social History:  Social History   Substance Use Topics    Smoking status: Former Smoker     Quit date: 2/12/1995    Smokeless tobacco: Never Used    Alcohol use 4.2 oz/week     7 Cans of beer per week       Allergies:  No Known Allergies      Review of Systems     Review of Systems   Constitutional: Positive for fever. HENT: Negative for sore throat. Respiratory: Negative for cough. Cardiovascular: Negative for chest pain. Gastrointestinal: Positive for abdominal pain, constipation and nausea. Negative for diarrhea. Genitourinary: Negative for decreased urine volume, difficulty urinating, dysuria, frequency, hematuria and urgency. All other systems reviewed and are negative. Physical Exam     Vitals:    05/28/18 1700 05/28/18 1704 05/28/18 1733 05/28/18 1857   BP: 128/77 128/77 127/80 117/82   Pulse: 92 87 82 69   Resp: 24 18 16 16   Temp:  99.9 °F (37.7 °C) 99.9 °F (37.7 °C) 98.2 °F (36.8 °C)   SpO2: 94% 96% 96% 100%   Weight:       Height:         Physical Exam   Constitutional: He is oriented to person, place, and time. He appears well-developed and well-nourished. Appears very uncomfortable, warm flushed skin    HENT:   Head: Normocephalic and atraumatic. Neck: Normal range of motion. Neck supple. Cardiovascular: Regular rhythm, normal heart sounds and intact distal pulses. Tachycardia present. No murmur heard.   Pulmonary/Chest: Effort normal and breath sounds normal. No respiratory distress. He has no wheezes. He has no rales. Abdominal: Soft. Bowel sounds are normal. There is tenderness in the right lower quadrant. There is rebound and guarding. There is no CVA tenderness. Neurological: He is alert and oriented to person, place, and time. Psychiatric: He has a normal mood and affect. Judgment normal.   Nursing note and vitals reviewed. Diagnostic Study Results     Labs -     Recent Results (from the past 12 hour(s))   LACTIC ACID    Collection Time: 05/28/18  2:10 PM   Result Value Ref Range    Lactic acid 2.6 (HH) 0.4 - 2.0 MMOL/L   METABOLIC PANEL, COMPREHENSIVE    Collection Time: 05/28/18  2:10 PM   Result Value Ref Range    Sodium 139 136 - 145 mmol/L    Potassium 3.4 (L) 3.5 - 5.5 mmol/L    Chloride 100 100 - 108 mmol/L    CO2 27 21 - 32 mmol/L    Anion gap 12 3.0 - 18 mmol/L    Glucose 97 74 - 99 mg/dL    BUN 8 7.0 - 18 MG/DL    Creatinine 0.86 0.6 - 1.3 MG/DL    BUN/Creatinine ratio 9 (L) 12 - 20      GFR est AA >60 >60 ml/min/1.73m2    GFR est non-AA >60 >60 ml/min/1.73m2    Calcium 8.9 8.5 - 10.1 MG/DL    Bilirubin, total 0.6 0.2 - 1.0 MG/DL    ALT (SGPT) 57 16 - 61 U/L    AST (SGOT) 28 15 - 37 U/L    Alk. phosphatase 72 45 - 117 U/L    Protein, total 7.4 6.4 - 8.2 g/dL    Albumin 3.8 3.4 - 5.0 g/dL    Globulin 3.6 2.0 - 4.0 g/dL    A-G Ratio 1.1 0.8 - 1.7     CBC WITH AUTOMATED DIFF    Collection Time: 05/28/18  2:10 PM   Result Value Ref Range    WBC 32.3 (H) 4.6 - 13.2 K/uL    RBC 4.37 (L) 4.70 - 5.50 M/uL    HGB 13.9 13.0 - 16.0 g/dL    HCT 39.9 36.0 - 48.0 %    MCV 91.3 74.0 - 97.0 FL    MCH 31.8 24.0 - 34.0 PG    MCHC 34.8 31.0 - 37.0 g/dL    RDW 14.4 11.6 - 14.5 %    PLATELET 170 (H) 260 - 420 K/uL    MPV 9.2 9.2 - 11.8 FL    NEUTROPHILS 74 42 - 75 %    BAND NEUTROPHILS 1 0 - 5 %    LYMPHOCYTES 17 (L) 20 - 51 %    MONOCYTES 8 2 - 9 %    EOSINOPHILS 0 0 - 5 %    BASOPHILS 0 0 - 3 %    ABS.  NEUTROPHILS 23.9 (H) 1.8 - 8.0 K/UL    ABS. LYMPHOCYTES 5.5 (H) 0.8 - 3.5 K/UL    ABS. MONOCYTES 2.6 (H) 0 - 1.0 K/UL    ABS. EOSINOPHILS 0.0 0.0 - 0.4 K/UL    ABS. BASOPHILS 0.0 0.0 - 0.1 K/UL    RBC COMMENTS NORMOCYTIC, NORMOCHROMIC      DF MANUAL     LACTIC ACID    Collection Time: 05/28/18  4:10 PM   Result Value Ref Range    Lactic acid 1.8 0.4 - 2.0 MMOL/L   URINALYSIS W/ RFLX MICROSCOPIC    Collection Time: 05/28/18  4:20 PM   Result Value Ref Range    Color YELLOW      Appearance CLEAR      Specific gravity 1.009 1.005 - 1.030      pH (UA) 5.5 5.0 - 8.0      Protein NEGATIVE  NEG mg/dL    Glucose NEGATIVE  NEG mg/dL    Ketone NEGATIVE  NEG mg/dL    Bilirubin NEGATIVE  NEG      Blood TRACE (A) NEG      Urobilinogen 0.2 0.2 - 1.0 EU/dL    Nitrites NEGATIVE  NEG      Leukocyte Esterase TRACE (A) NEG     URINE MICROSCOPIC ONLY    Collection Time: 05/28/18  4:20 PM   Result Value Ref Range    WBC 0 to 3 0 - 5 /hpf    RBC 0 to 3 0 - 5 /hpf    Epithelial cells 0 to 5 0 - 5 /lpf    Bacteria NEGATIVE  NEG /hpf       Radiologic Studies -   CT Results  (Last 48 hours)               05/28/18 1543  CT ABD PELV W CONT Final result    Impression:  IMPRESSION:       1. Findings consistent with colitis involving the ascending and transverse   colon. Etiology uncertain. 2. Hepatic steatosis. 3. Prior splenectomy. Narrative:  EXAM: CT of the abdomen and pelvis       INDICATION: Abdominal pain with fever       COMPARISON: CT abdomen 5/23/2014. CT abdomen and pelvis 4/27/2014       TECHNIQUE: Axial CT imaging of the abdomen and pelvis was performed with   intravenous contrast. Multiplanar reformats were generated. One or more dose reduction techniques were used on this CT: automated exposure   control, adjustment of the mAs and/or kVp according to patient's size, and   iterative reconstruction techniques. The specific techniques utilized on this CT   exam have been documented in the patient's electronic medical record. _______________       FINDINGS:       LOWER CHEST: Bilateral lower lobe and lingular scarring. LIVER, BILIARY: Liver is mildly decreased in attenuation. Subcentimeter   hypodensity superior right lobe is unchanged and probably represents a cyst. No   biliary dilation. Gallbladder is unremarkable. PANCREAS: Normal.       SPLEEN: Spleen appears to be surgically absent. A small splenule is unchanged in   the splenic fossa. ADRENALS: Normal.       KIDNEYS: Stable left renal cyst. Kidneys are otherwise normal.       LYMPH NODES: No enlarged lymph nodes. Surgical clips in the retroperitoneum   probably from prior lymph node dissection. GASTROINTESTINAL TRACT: There is mural thickening involving the descending colon   and transverse colon up to the splenic flexure. Cecum appears to be spared as   does the left colon beyond the splenic flexure. Findings are consistent with   colitis. No evidence of bowel obstruction. PELVIC ORGANS: Unremarkable. VASCULATURE: Mild atherosclerotic changes without evidence of aneurysm. .       BONES: No acute or aggressive osseous abnormalities identified. OTHER: No free fluid.       _______________               CXR Results  (Last 48 hours)               05/28/18 1417  XR CHEST PORT Final result    Impression:  IMPRESSION:           1. Stable chest with bilateral lower lobe scarring. No acute changes. Narrative:  EXAM: Chest portable       INDICATION: Fever and abdominal pain       COMPARISON: Two-view chest 4/27/2014       _______________       FINDINGS:       AP portable chest film was performed. Stable bilateral lower lobe scarring. No   acute infiltrate, effusion or pneumothorax. Heart and pulmonary vascularity are   normal. Prior cervical fusion. Surgical clips left epigastric region.        _______________                 Medications Given in the ED:  Medications   sodium chloride (NS) flush 5-10 mL (not administered)   0.9% sodium chloride infusion (75 mL/hr IntraVENous New Bag 5/28/18 1813)   metroNIDAZOLE (FLAGYL) IVPB premix 500 mg (not administered)   ciprofloxacin (CIPRO) 400 mg IVPB (premix) (400 mg IntraVENous New Bag 5/28/18 1812)   metoprolol succinate (TOPROL-XL) XL tablet 25 mg (not administered)   aspirin delayed-release tablet 81 mg (not administered)   cyclobenzaprine (FLEXERIL) tablet 10 mg (not administered)   oxyCODONE-acetaminophen (PERCOCET) 5-325 mg per tablet 1 Tab (1 Tab Oral Given 5/28/18 1812)   rosuvastatin (CRESTOR) tablet 40 mg (not administered)   gabapentin (NEURONTIN) capsule 100 mg (not administered)   pantoprazole (PROTONIX) tablet 40 mg (not administered)   Saccharomyces boulardii (FLORASTOR) capsule 500 mg (not administered)   acetaminophen (TYLENOL) tablet 650 mg (not administered)   naloxone (NARCAN) injection 0.4 mg (not administered)   diphenhydrAMINE (BENADRYL) injection 12.5 mg (not administered)   ondansetron (ZOFRAN) injection 4 mg (not administered)   heparin (porcine) injection 5,000 Units (5,000 Units SubCUTAneous Given 5/28/18 1813)   losartan (COZAAR) tablet 100 mg (not administered)   hydroCHLOROthiazide (HYDRODIURIL) tablet 12.5 mg (not administered)   sodium chloride 0.9 % bolus infusion 1,000 mL (1,000 mL IntraVENous New Bag 5/28/18 1453)     Followed by   sodium chloride 0.9 % bolus infusion 1,000 mL (0 mL IntraVENous IV Completed 5/28/18 1657)     Followed by   sodium chloride 0.9 % bolus infusion 490 mL (490 mL IntraVENous New Bag 5/28/18 1700)   butorphanol (STADOL) injection 0.5 mg (0.5 mg IntraVENous Given 5/28/18 1459)   ondansetron (ZOFRAN) injection 4 mg (4 mg IntraVENous Given 5/28/18 1455)   piperacillin-tazobactam (ZOSYN) 3.375 g in 0.9% sodium chloride (MBP/ADV) 100 mL MBP (0 g IntraVENous IV Completed 5/28/18 1614)   metroNIDAZOLE (FLAGYL) IVPB premix 500 mg (500 mg IntraVENous New Bag 5/28/18 1616)   acetaminophen (OFIRMEV) infusion 1,000 mg (1,000 mg IntraVENous New Bag 5/28/18 1641)   iopamidol (ISOVUE 300) 61 % contrast injection  mL (100 mL IntraVENous Given 5/28/18 1539)   butorphanol (STADOL) injection 0.5 mg (0.5 mg IntraVENous Given 5/28/18 1635)   potassium chloride (K-DUR, KLOR-CON) SR tablet 40 mEq (40 mEq Oral Given 5/28/18 1900)        Medical Decision Making     I am the first provider for this patient. I reviewed the vital signs, available nursing notes, past medical history, past surgical history, family history and social history. Records Reviewed: Nursing Notes and Old Medical Records    Vital Signs-Reviewed the patient's vital signs.   Patient Vitals for the past 12 hrs:   Temp Pulse Resp BP SpO2   05/28/18 1857 98.2 °F (36.8 °C) 69 16 117/82 100 %   05/28/18 1733 99.9 °F (37.7 °C) 82 16 127/80 96 %   05/28/18 1704 99.9 °F (37.7 °C) 87 18 128/77 96 %   05/28/18 1700 - 92 24 128/77 94 %   05/28/18 1600 - 88 24 129/79 100 %   05/28/18 1350 (!) 101.8 °F (38.8 °C) (!) 118 20 (!) 147/92 97 %       Pulse Oximetry Analysis - Normal 97% on RA        Provider Notes (Medical Decision Making):   DDX Considered   Diverticulitis, constipation, gastroenteritis, appendicitis, SBO, LBO / fecal impaction, colitis, IBS, inflammatory bowel disease (Crohn's or ulcerative colitis), mesenteric ischemia, hernia (femoral, inguinal, umbilical), renal calculi, infected stone  + recent abx use, denies diarrhea      Procedures:  Procedures     SEPSIS ASSESSMENT NOTE:   4:31 PM  Time DAVID Ybarra has seen and assessed the patient as follows:    Capillary Refill:normal/brisk  Cardiopulmonary Evaluation:   Lung Sounds: clear   Cardiac Sounds: regular  Peripheral Pulses: present  Skin Exam: skin unremarkable, pink, warm    Visit Vitals    /82 (BP 1 Location: Left arm, BP Patient Position: At rest)    Pulse 69    Temp 98.2 °F (36.8 °C)    Resp 16    Ht 5' 8\" (1.727 m)    Wt 83 kg (183 lb)    SpO2 100%    BMI 27.83 kg/m2       ED Course:   1:59 PM Initial assessment performed. Pt meets SIRS criteria. Sepsis bundle initiated. Pt and/or pt's family are aware of the plan of care and are in agreement. 2:23 PM Consult Note: Discussed patient's history, exam, and available diagnostics results in person with Edin Broderick MD, ED Attending, who recommends placing pt on Zosyn and Flagyl. 4:11 PM Consult Note: Discussed patient's history, exam, and available diagnostics results in person with Jaye Bob DO (General Surgery), colorectal, who reviewed CT results. She suspects C. diff and possible ileus given presentation, WBC, CT findings, and no free air or free fluid in abdomen. Requesting admission to hospitalist and treating as C. diff. Will consult. 4:18 PM Consult Note: Discussed patient's history, exam, and available diagnostics results over the telephone with Oanh Saeed MD, internal medicine, who agrees to admit to Remote Telemetry. Diagnosis and Disposition       Critical Care Time:  I have spent 60 minutes of critical care time involved in lab review, consultations with specialist, family decision-making, and documentation. During this entire length of time I was immediately available to the patient. Critical Care: The reason for providing this level of medical care for this critically ill patient was due a critical illness that impaired one or more vital organ systems such that there was a high probability of imminent or life threatening deterioration in the patients condition. This care involved high complexity decision making to assess, manipulate, and support vital system functions, to treat this degreee vital organ system failure and to prevent further life threatening deterioration of the patients condition. Core Measures:  For Hospitalized Patients:    1. Hospitalization Decision Time:  The decision to hospitalize the patient was made by Jaye Bob DO (Colorectal Surgery) and Booker Ybarra PA-C at 4:11 PM on 5/28/2018    2.  Aspirin: Aspirin was not given because the patient did not present with a stroke at the time of their Emergency Department evaluation    3. Plan: Admit to Telemetry    4:22 PM Patient is being admitted to the hospital by Catalina Vargas MD. The results of their tests and reasons for their admission have been discussed with them and/or available family. They convey agreement and understanding for the need to be admitted and for their admission diagnosis. Conditions on Admission:  Sepsis is present at the time of admission. Deep Vein Thrombosis is not present at the time of admission. Thrombosis is not present at the time of admission. Urinary Tract Infection is not present at the time of admission. Pneumonia is not present at the time of admission. MRSA is not present at the time of admission. Wound infection is not present at the time of admission. Pressure Ulcer is not present at the time of admission. Clinical Impression:    1. Noninfectious gastroenteritis, unspecified type    2. Sepsis, due to unspecified organism [facility])         _______________________________    Attestations: This note is prepared by Maged Manjarrez, acting as Scribe for Booker Ybarra PA-C. Booker Ybarra PA-C:  The scribe's documentation has been prepared under my direction and personally reviewed by me in its entirety.   I confirm that the note above accurately reflects all work, treatment, procedures, and medical decision making performed by me.  _______________________________

## 2018-05-28 NOTE — ED TRIAGE NOTES
Patient presents complaining of RLQ pain and fevers onset 0400 this morning. Patient also reports nausea. Patient states he has history of perforated bowel approximately x10 years ago and states pain is similar. Patient states he last took tylenol around 1000 this morning. Sepsis Screening completed    ( x )Patient meets SIRS criteria. (  )Patient does not meet SIRS criteria.       SIRS Criteria is achieved when two or more of the following are present   Temperature < 96.8°F (36°C) or > 100.9°F (38.3°C)   Heart Rate > 90 beats per minute   Respiratory Rate > 20 breaths per minute   WBC count > 12,000 or <4,000 or > 10% bands

## 2018-05-28 NOTE — IP AVS SNAPSHOT
Jamarcus Bustos 
 
 
 [address] [facility] [address] 
[phone] Patient: Jaden Aguilar MRN: [id number] Lily Foote About your hospitalization You were admitted on:  May 28, 2018 You last received care in the:  [address] [place] [address] You were discharged on:  June 1, 2018 Why you were hospitalized Your primary diagnosis was:  Colitis Your diagnoses also included:  Hypokalemia, Nhl (Non-Hodgkin's Lymphoma) (Hcc), Hypercholesteremia, Gerd (Gastroesophageal Reflux Disease), Htn (Hypertension), S/P Splenectomy, Probable Sepsis, Hypomagnesemia Follow-up Information Follow up With Details Comments Contact Info Sherley Alvares MD On 6/6/2018 Follow up appointment scheduled for June 6, 2018 at 2:20 p.m. [address] [facility] [address] [facility] [id number] [phone] Moises Montgomery MD   [facility] [address] [facility] [id number] [phone] Discharge Orders None A check ama indicates which time of day the medication should be taken. My Medications START taking these medications Instructions Each Dose to Equal  
 Morning Noon Evening Bedtime  
 ciprofloxacin HCl 500 mg tablet Commonly known as:  CIPRO Your last dose was: Your next dose is: Take 1 Tab by mouth two (2) times a day for 10 days. 500 mg  
    
   
   
   
  
 metroNIDAZOLE 500 mg tablet Commonly known as:  FLAGYL Your last dose was: Your next dose is: Take 1 Tab by mouth three (3) times daily for 10 days. 500 mg Saccharomyces boulardii 250 mg capsule Commonly known as:  [employer] Your last dose was: Your next dose is: Take 2 Caps by mouth two (2) times a day for 12 days. 500 mg CHANGE how you take these medications Instructions Each Dose to Equal  
 Morning Noon Evening Bedtime oxyCODONE-acetaminophen 5-325 mg per tablet Commonly known as:  PERCOCET What changed:  reasons to take this Your last dose was: Your next dose is: Take 1 Tab by mouth every four (4) hours as needed. Max Daily Amount: 6 Tabs. 1 Tab CONTINUE taking these medications Instructions Each Dose to Equal  
 Morning Noon Evening Bedtime  
 aspirin delayed-release 81 mg tablet Your last dose was: Your next dose is: Take  by mouth daily. CRESTOR 40 mg tablet Generic drug:  rosuvastatin Your last dose was: Your next dose is: Take 40 mg by mouth daily. Indications: hypercholesterolemia 40 mg  
    
   
   
   
  
 FIBER LAXATIVE PO Your last dose was: Your next dose is: Take 5 Caps by mouth two (2) times a day. 5 Cap  
    
   
   
   
  
 losartan-hydroCHLOROthiazide 100-12.5 mg per tablet Commonly known as:  HYZAAR Your last dose was: Your next dose is: Take 1 Tab by mouth daily. Indications: hypertension 1 Tab  
    
   
   
   
  
 metoprolol succinate 25 mg XL tablet Commonly known as:  TOPROL-XL Your last dose was: Your next dose is: Take 25 mg by mouth nightly. Indications: hypertension 25 mg  
    
   
   
   
  
 MULTI-VITAMIN PO Your last dose was: Your next dose is: Take  by mouth daily. NexIUM 20 mg capsule Generic drug:  esomeprazole Your last dose was: Your next dose is: Take 40 mg by mouth daily. Indications: gastroesophageal reflux disease 40 mg Where to Get Your Medications These medications were sent to [address] [place] [address] [address]  [address] [facility] [address], [facility] Hours:  M-F 930am-6pm Phone:  [phone]  
  ciprofloxacin HCl 500 mg tablet  
 metroNIDAZOLE 500 mg tablet Saccharomyces boulardii 250 mg capsule Information on where to get these meds will be given to you by the nurse or doctor. ! Ask your nurse or doctor about these medications  
  oxyCODONE-acetaminophen 5-325 mg per tablet Opioid Education Prescription Opioids: What You Need to Know: 
 
 
 
F-face looks uneven A-arms unable to move or move unevenly S-speech slurred or non-existent T-time-call 911 as soon as signs and symptoms begin-DO NOT go Back to bed or wait to see if you get better-TIME IS BRAIN. Warning Signs of HEART ATTACK Call 911 if you have these symptoms: 
? Chest discomfort. Most heart attacks involve discomfort in the center of the chest that lasts more than a few minutes, or that goes away and comes back. It can feel like uncomfortable pressure, squeezing, fullness, or pain. ? Discomfort in other areas of the upper body. Symptoms can include pain or discomfort in one or both arms, the back, neck, jaw, or stomach. ? Shortness of breath with or without chest discomfort. ? Other signs may include breaking out in a cold sweat, nausea, or lightheadedness. Don't wait more than five minutes to call [address]! Fast action can save your life.  Calling 911 is almost always the fastest way to get lifesaving treatment. Emergency Medical Services staff can begin treatment when they arrive  up to an hour sooner than if someone gets to the hospital by car. The discharge information has been reviewed with the patient. The patient verbalized understanding. Discharge medications reviewed with the patient and appropriate educational materials and side effects teaching were provided. ___________________________________________________________________________________________________________________________________ [facility] Announcement We are excited to announce that we are making your provider's discharge notes available to you in [facility] You will see these notes when they are completed and signed by the physician that discharged you from your recent hospital stay. If you have any questions or concerns about any information you see in [facility] please call the Health Information Department where you were seen or reach out to your Primary Care Provider for more information about your plan of care. Introducing [facility]! Farida Martinez introduces [facility] [employer] patient portal. Now you can access parts of your medical record, email your doctor's office, and request medication refills online. 1. In your internet browser, go to [email or web address] 2. Click on the First Time User? Click Here link in the Sign In box. You will see the New Member Sign Up page. 3. Enter your [facility] Access Code exactly as it appears below. You will not need to use this code after youve completed the sign-up process. If you do not sign up before the expiration date, you must request a new code. · [facility] Access Code: [email or web address] Expires: 8/26/2018  1:50 PM 
 
4. Enter the last four digits of your Social Security Number (xxxx) and Date of Birth (mm/dd/yyyy) as indicated and click Submit. You will be taken to the next sign-up page. 5. Create a Viralize ID. This will be your [employer] login ID and cannot be changed, so think of one that is secure and easy to remember. 6. Create a [employer] password. You can change your password at any time. 7. Enter your Password Reset Question and Answer. This can be used at a later time if you forget your password. 8. Enter your e-mail address. You will receive e-mail notification when new information is available in [facility] 9. Click Sign Up. You can now view and download portions of your medical record. 10. Click the Download Summary menu link to download a portable copy of your medical information. If you have questions, please visit the Frequently Asked Questions section of the [employer] website. Remember, [employer] is NOT to be used for urgent needs. For medical emergencies, dial 911. Now available from your iPhone and Android! Introducing Mauri Ba As a [employer] patient, I wanted to make you aware of our electronic visit tool called Mauri Ba. [employer] 24/7 allows you to connect within minutes with a medical provider 24 hours a day, seven days a week via a mobile device or tablet or logging into a secure website from your computer. You can access Mauri Ba from anywhere in the [place]. A virtual visit might be right for you when you have a simple condition and feel like you just dont want to get out of bed, or cant get away from work for an appointment, when your regular [employer] provider is not available (evenings, weekends or holidays), or when youre out of town and need minor care. Electronic visits cost only $49 and if the [employer] 24/7 provider determines a prescription is needed to treat your condition, one can be electronically transmitted to a nearby pharmacy*. Please take a moment to enroll today if you have not already done so. The enrollment process is free and takes just a few minutes.   To enroll, please download the [facility] 24/7 nina to your tablet or phone, or visit [email or web address] to enroll on your computer. And, as an [address] [place] [address] patient with a [employer] account, the results of your visits will be scanned into your electronic medical record and your primary care provider will be able to view the scanned results. We urge you to continue to see your regular [facility] provider for your ongoing medical care. And while your primary care provider may not be the one available when you seek a [facility] virtual visit, the peace of mind you get from getting a real diagnosis real time can be priceless. For more information on [facility], view our Frequently Asked Questions (FAQs) at [email or web address]. Sincerely, 
 
Amy Duarte MD 
Chief Medical Officer [facility] Hemran *:  certain medications cannot be prescribed via [facility] Unresulted Labs-Please follow up with your PCP about these lab tests Order Current Status OVA & PARASITES, STOOL In process CULTURE, BLOOD Preliminary result CULTURE, BLOOD Preliminary result Providers Seen During Your Hospitalization Provider Specialty Primary office phone Renetta Richard MD Emergency Medicine [phone] Ceci Sampson MD Internal Medicine [phone] Your Primary Care Physician (PCP) Primary Care Physician Office Phone Office Fax [facility] [place] [phone] You are allergic to the following No active allergies Recent Documentation Height Weight BMI Smoking Status 1.727 m 81.7 kg 27.39 kg/m2 Former Smoker Emergency Contacts Name Discharge Info Relation Home Work Mobile Juli Brice DISCHARGE CAREGIVER [3] Mother [14]   [phone] Epifanio Blount DISCHARGE CAREGIVER [3] Father [15] [phone] Patient Belongings The following personal items are in your possession at time of discharge: 
  Dental Appliances: None  Visual Aid: None      Home Medications: None   Jewelry: None  Clothing: Footwear, Shirt, Undergarments, Pants    Other Valuables: [place], [address] Discharge Instructions Attachments/References COLITIS (ENGLISH) Patient Handouts Colitis: Care Instructions Your Care Instructions Colitis is the medical term for swelling (inflammation) of the intestine. It can be caused by different things, such as an infection or loss of blood flow in the intestine. Other causes are problems like Crohn's disease or ulcerative colitis. Symptoms may include fever, diarrhea that may be bloody, or belly pain. Sometimes symptoms go away without treatment. But you may need treatment or more tests, such as blood tests or a stool test. Or you may need imaging tests like a CT scan or a colonoscopy. In some cases, the doctor may want to test a sample of tissue from the intestine. This test is called a biopsy. The doctor has checked you carefully, but problems can develop later. If you notice any problems or new symptoms, get medical treatment right away. Follow-up care is a key part of your treatment and safety. Be sure to make and go to all appointments, and call your doctor if you are having problems. It's also a good idea to know your test results and keep a list of the medicines you take. How can you care for yourself at home? · Rest until you feel better. · Your doctor may recommend that you eat bland foods. These include rice, dry toast or crackers, bananas, and applesauce. · To prevent dehydration, drink plenty of fluids. Choose water and other caffeine-free clear liquids until you feel better. If you have kidney, heart, or liver disease and have to limit fluids, talk with your doctor before you increase the amount of fluids you drink. · Be safe with medicines. Take your medicines exactly as prescribed. Call your doctor if you think you are having a problem with your medicine. You will get more details on the specific medicines your doctor prescribes. When should you call for help? Call 911 anytime you think you may need emergency care. For example, call if: 
? · You passed out (lost consciousness). ? · Your stools are maroon or very bloody. ?Call your doctor now or seek immediate medical care if: 
? · You have new or worse belly pain. ? · You have a fever. ? · You are vomiting. ? · You cannot pass stools or gas. ? · You have new or more blood in your stools. ? Watch closely for changes in your health, and be sure to contact your doctor if: 
? · You have new or worse symptoms. ? · You are losing weight. ? · You do not get better as expected. Where can you learn more? Go to http://elian-waqas.info/. Jerald Barone in the search box to learn more about \"Colitis: Care Instructions. \" Current as of: May 12, 2017 Content Version: 11.4 © 3632-0996 [employer]. Care instructions adapted under license by [employer] (which disclaims liability or warranty for this information). If you have questions about a medical condition or this instruction, always ask your healthcare professional. [address] any warranty or liability for your use of this information. Please provide this summary of care documentation to your next provider. Signatures-by signing, you are acknowledging that this After Visit Summary has been reviewed with you and you have received a copy. Patient Signature:  ____________________________________________________________ Date:  ____________________________________________________________  
  
Marlen Dotson Provider Signature:  ____________________________________________________________ Date:  ____________________________________________________________

## 2018-05-28 NOTE — H&P
History & Physical    Patient: Marshall Grullon MRN: [id number]  CSN: [id number]    YOB: 1960  Age: 65 y.o. Sex: male      DOA: 5/28/2018  Primary Care Provider:  Tere Monet III, MD      Assessment/Plan     Hospital Problems  Date Reviewed: 2/13/2018          Codes Class Noted POA    Colitis ICD-10-CM: K52.9  ICD-9-CM: 558.9  5/28/2018 Unknown        Hypokalemia ICD-10-CM: E87.6  ICD-9-CM: 276.8  5/28/2018 Unknown        Hypercholesteremia ICD-10-CM: E78.00  ICD-9-CM: 272.0  5/28/2018 Unknown        GERD (gastroesophageal reflux disease) ICD-10-CM: K21.9  ICD-9-CM: 530.81  5/28/2018 Unknown        HTN (hypertension) ICD-10-CM: I10  ICD-9-CM: 401.9  5/28/2018 Unknown        NHL (non-Hodgkin's lymphoma) (HCC) (Chronic) ICD-10-CM: C85.90  ICD-9-CM: 202.80  4/27/2014 Yes        S/P splenectomy ICD-10-CM: Z90.81  ICD-9-CM: V45.79  4/27/2014 Yes              Admit to tele     Colitis  Will send stool sample to r/o c diff due to recent abx   Continue hydration   Pain control and  Mild iv hydration     Hypokalemia   Replacement        HTN, accelerated  Continue home medication. HLD   Continue statin     NHL   On remission for years     S/p splenectomy      Possible sepsis   Sepsis protocol   Iv abx , bcx   Iv hydration       Full code     DVT : lovenox. ppi proph  CC: abdomen pain        HPI:     Marshall Grullon is a 65 y.o. male who hx of nhl on remission, recent abx use, htn, hld, s/p splenectomy came to er due to acute abdomen pain today. Located  RLQ, associated with fever/chills/multiple small stools -no bloody in it. Ct abdomen indicate colitis and wbc  32.3K. He had been treated with abx for RUDY and completed abx yesterday. He got his appendix removed years ago, reported no appetite. His lactic acid was 2.6, fluid bolus given and down to 1.8     Denies any slurred speech/headache/cp/n/v/blurred vission/d/c/palpitation/gait change/bleeding. Denies smoking/ any alcohol or drug use.        Visit Vitals    /80    Pulse 82    Temp 99.9 °F (37.7 °C)    Resp 16    Ht 5' 8\" (1.727 m)    Wt 83 kg (183 lb)    SpO2 96%    BMI 27.83 kg/m2      O2 Device: Room air      Past Medical History:   Diagnosis Date    Acid reflux     Arthritis     Cancer ([facility] [address] 1983    non Hodgkin's     GERD (gastroesophageal reflux disease)     Hypercholesteremia     Hypertension 01/29/2018    BP spiked when administered anesthesia for scheduled surgery. on 1-    Ill-defined condition     menieres    Meniere disease, left     Non-Hodgkin lymphoma ([facility] [address]        Past Surgical History:   Procedure Laterality Date    ABDOMEN SURGERY PROC UNLISTED      spleenectomy, laporotomy    HX APPENDECTOMY      HX HERNIA REPAIR Bilateral     HX ORTHOPAEDIC Left     wrist pinning    HX ROTATOR CUFF REPAIR Bilateral     HX TONSILLECTOMY         History reviewed. No pertinent family history. Social History     Social History    Marital status: LEGALLY      Spouse name: N/A    Number of children: N/A    Years of education: N/A     Social History Main Topics    Smoking status: Former Smoker     Quit date: 2/12/1995    Smokeless tobacco: Never Used    Alcohol use 4.2 oz/week     7 Cans of beer per week    Drug use: No    Sexual activity: Not Asked     Other Topics Concern    Metallic Foreign Body Yes     Surg. screw in Lt Wrist    Claustrophobic Yes     Social History Narrative       Prior to Admission medications    Medication Sig Start Date End Date Taking? Authorizing Provider   aspirin delayed-release 81 mg tablet Take  by mouth daily. Yes Phys Other, MD   losartan-hydroCHLOROthiazide (HYZAAR) 100-12.5 mg per tablet Take 1 Tab by mouth daily. Indications: hypertension   Yes Historical Provider   metoprolol succinate (TOPROL-XL) 25 mg XL tablet Take 25 mg by mouth nightly. Indications: hypertension   Yes Historical Provider   rosuvastatin (CRESTOR) 40 mg tablet Take 40 mg by mouth daily.  Indications: hypercholesterolemia   Yes Historical Provider   esomeprazole (NEXIUM) 20 mg capsule Take 40 mg by mouth daily. Indications: gastroesophageal reflux disease   Yes Maurisio Bae MD   cyclobenzaprine (FLEXERIL) 10 mg tablet Take 1 Tab by mouth three (3) times daily as needed for Muscle Spasm(s). 18   Cayden Conway MD   oxyCODONE-acetaminophen (PERCOCET) 5-325 mg per tablet Take 1 Tab by mouth every four (4) hours as needed for Pain. Max Daily Amount: 6 Tabs. 18   Cayden Conway MD   gabapentin (NEURONTIN) 100 mg capsule Take 100 mg by mouth daily. Indications: neck pain    Historical Provider   MULTIVITAMINS WITH FLUORIDE (MULTI-VITAMIN PO) Take  by mouth daily. Historical Provider   METHYLCELLULOSE (FIBER LAXATIVE PO) Take 5 Caps by mouth two (2) times a day. Historical Provider       No Known Allergies    Review of Systems  Gen: fever, chills, malaise, no weight loss/gain. Heent: No headache, rhinorrhea, epistaxis, ear pain, hearing loss, sinus pain, neck pain/stiffness, sore throat. Heart: No chest pain, palpitations, MERCADO, pnd, or orthopnea. Resp: No cough, hemoptysis, wheezing and shortness of breath. GI: nausea, no  vomiting, diarrhea, constipation, melena or hematochezia. Abdomen pain   : No urinary obstruction, dysuria or hematuria. Derm: No rash, new skin lesion or pruritis. Musc/skeletal: no bone or joint complains. Vasc: No edema, cyanosis or claudication. Endo: No heat/cold intolerance, no polyuria,polydipsia or polyphagia. Neuro: No unilateral weakness, numbness, tingling. No seizures. Heme: No easy bruising or bleeding.           Physical Exam:     Physical Exam:  Visit Vitals    /80    Pulse 82    Temp 99.9 °F (37.7 °C)    Resp 16    Ht 5' 8\" (1.727 m)    Wt 83 kg (183 lb)    SpO2 96%    BMI 27.83 kg/m2      O2 Device: Room air    Temp (24hrs), Av.5 °F (38.1 °C), Min:99.9 °F (37.7 °C), Max:101.8 °F (38.8 °C)             General:  Awake, cooperative, no distress. Head:  Normocephalic, without obvious abnormality, atraumatic. Eyes:  Conjunctivae/corneas clear, sclera anicteric, PERRL, EOMs intact. Nose: Nares normal. No drainage or sinus tenderness. Throat: Lips, mucosa, and tongue normal. .   Neck: Supple, symmetrical, trachea midline, no adenopathy. Lungs:   Clear to auscultation bilaterally. Heart:  Regular rate and rhythm, S1, S2 normal, no murmur, click, rub or gallop. Abdomen: Soft, mild tender. Bowel sounds normal. No masses,  No organomegaly. Extremities: Extremities normal, atraumatic, no cyanosis or edema. Pulses: 2+ and symmetric all extremities. Skin: Skin color-pink, texture, turgor normal. No rashes or lesions. Capillary refill normal    Neurologic: CNII-XII intact. No focal motor or sensory deficit.        Labs Reviewed:    BMP:   Lab Results   Component Value Date/Time     05/28/2018 02:10 PM    K 3.4 (L) 05/28/2018 02:10 PM     05/28/2018 02:10 PM    CO2 27 05/28/2018 02:10 PM    AGAP 12 05/28/2018 02:10 PM    GLU 97 05/28/2018 02:10 PM    BUN 8 05/28/2018 02:10 PM    CREA 0.86 05/28/2018 02:10 PM    GFRAA >60 05/28/2018 02:10 PM    GFRNA >60 05/28/2018 02:10 PM     CMP:   Lab Results   Component Value Date/Time     05/28/2018 02:10 PM    K 3.4 (L) 05/28/2018 02:10 PM     05/28/2018 02:10 PM    CO2 27 05/28/2018 02:10 PM    AGAP 12 05/28/2018 02:10 PM    GLU 97 05/28/2018 02:10 PM    BUN 8 05/28/2018 02:10 PM    CREA 0.86 05/28/2018 02:10 PM    GFRAA >60 05/28/2018 02:10 PM    GFRNA >60 05/28/2018 02:10 PM    CA 8.9 05/28/2018 02:10 PM    ALB 3.8 05/28/2018 02:10 PM    TP 7.4 05/28/2018 02:10 PM    GLOB 3.6 05/28/2018 02:10 PM    AGRAT 1.1 05/28/2018 02:10 PM    SGOT 28 05/28/2018 02:10 PM    ALT 57 05/28/2018 02:10 PM     CBC:   Lab Results   Component Value Date/Time    WBC 32.3 (H) 05/28/2018 02:10 PM    HGB 13.9 05/28/2018 02:10 PM    HCT 39.9 05/28/2018 02:10 PM     (H) 05/28/2018 02:10 PM     All Cardiac Markers in the last 24 hours: No results found for: CPK, CK, CKMMB, CKMB, RCK3, CKMBT, CKNDX, CKND1, SURENDRA, TROPT, TROIQ, ALANIS, TROPT, TNIPOC, BNP, BNPP  Recent Glucose Results:   Lab Results   Component Value Date/Time    GLU 97 05/28/2018 02:10 PM     ABG: No results found for: PH, PHI, PCO2, PCO2I, PO2, PO2I, HCO3, HCO3I, FIO2, FIO2I  COAGS: No results found for: APTT, PTP, INR  Liver Panel:   Lab Results   Component Value Date/Time    ALB 3.8 05/28/2018 02:10 PM    TP 7.4 05/28/2018 02:10 PM    GLOB 3.6 05/28/2018 02:10 PM    AGRAT 1.1 05/28/2018 02:10 PM    SGOT 28 05/28/2018 02:10 PM    ALT 57 05/28/2018 02:10 PM    AP 72 05/28/2018 02:10 PM     Pancreatic Markers: No results found for: AMYLPOCT, AML, LIPPOCT, LPSE    Procedures/imaging: see electronic medical records for all procedures/Xrays and details which were not copied into this note but were reviewed prior to creation of George Everett MD, Internal Medicine     CC: Sarah George III, MD

## 2018-05-28 NOTE — ED NOTES
Pt states pain down to 3/10, bed in low position, call bell within reach, side rails up , pt states feeling better, family at bedside, IV infusing per pump without any difficulty, iv site without redness or swelling,

## 2018-05-29 PROBLEM — E83.42 HYPOMAGNESEMIA: Status: ACTIVE | Noted: 2018-05-29

## 2018-05-29 LAB
ANION GAP SERPL CALC-SCNC: 10 MMOL/L (ref 3–18)
BACTERIA SPEC CULT: NORMAL
BASOPHILS # BLD: 0 K/UL (ref 0–0.1)
BASOPHILS NFR BLD: 0 % (ref 0–3)
BUN SERPL-MCNC: 7 MG/DL (ref 7–18)
BUN/CREAT SERPL: 10 (ref 12–20)
CALCIUM SERPL-MCNC: 7.6 MG/DL (ref 8.5–10.1)
CHLORIDE SERPL-SCNC: 107 MMOL/L (ref 100–108)
CO2 SERPL-SCNC: 25 MMOL/L (ref 21–32)
CREAT SERPL-MCNC: 0.72 MG/DL (ref 0.6–1.3)
DIFFERENTIAL METHOD BLD: ABNORMAL
EOSINOPHIL # BLD: 0.2 K/UL (ref 0–0.4)
EOSINOPHIL NFR BLD: 1 % (ref 0–5)
ERYTHROCYTE [DISTWIDTH] IN BLOOD BY AUTOMATED COUNT: 15 % (ref 11.6–14.5)
GLUCOSE SERPL-MCNC: 107 MG/DL (ref 74–99)
HCT VFR BLD AUTO: 38.4 % (ref 36–48)
HGB BLD-MCNC: 12.8 G/DL (ref 13–16)
LYMPHOCYTES # BLD: 3.9 K/UL (ref 0.8–3.5)
LYMPHOCYTES NFR BLD: 16 % (ref 20–51)
MAGNESIUM SERPL-MCNC: 1.3 MG/DL (ref 1.6–2.6)
MCH RBC QN AUTO: 31.3 PG (ref 24–34)
MCHC RBC AUTO-ENTMCNC: 33.3 G/DL (ref 31–37)
MCV RBC AUTO: 93.9 FL (ref 74–97)
MONOCYTES # BLD: 2.2 K/UL (ref 0–1)
MONOCYTES NFR BLD: 9 % (ref 2–9)
NEUTS BAND NFR BLD MANUAL: 5 % (ref 0–5)
NEUTS SEG # BLD: 16.8 K/UL (ref 1.8–8)
NEUTS SEG NFR BLD: 69 % (ref 42–75)
PLATELET # BLD AUTO: 452 K/UL (ref 135–420)
PMV BLD AUTO: 9 FL (ref 9.2–11.8)
POTASSIUM SERPL-SCNC: 3.7 MMOL/L (ref 3.5–5.5)
RBC # BLD AUTO: 4.09 M/UL (ref 4.7–5.5)
RBC MORPH BLD: ABNORMAL
SERVICE CMNT-IMP: NORMAL
SODIUM SERPL-SCNC: 142 MMOL/L (ref 136–145)
WBC # BLD AUTO: 24.4 K/UL (ref 4.6–13.2)

## 2018-05-29 PROCEDURE — 74011250637 HC RX REV CODE- 250/637: Performed by: INTERNAL MEDICINE

## 2018-05-29 PROCEDURE — 83735 ASSAY OF MAGNESIUM: CPT | Performed by: HOSPITALIST

## 2018-05-29 PROCEDURE — 65270000029 HC RM PRIVATE

## 2018-05-29 PROCEDURE — 85025 COMPLETE CBC W/AUTO DIFF WBC: CPT | Performed by: HOSPITALIST

## 2018-05-29 PROCEDURE — 74011250636 HC RX REV CODE- 250/636: Performed by: HOSPITALIST

## 2018-05-29 PROCEDURE — 87177 OVA AND PARASITES SMEARS: CPT | Performed by: PHYSICIAN ASSISTANT

## 2018-05-29 PROCEDURE — 74011000250 HC RX REV CODE- 250: Performed by: HOSPITALIST

## 2018-05-29 PROCEDURE — 80048 BASIC METABOLIC PNL TOTAL CA: CPT | Performed by: HOSPITALIST

## 2018-05-29 PROCEDURE — 36415 COLL VENOUS BLD VENIPUNCTURE: CPT | Performed by: HOSPITALIST

## 2018-05-29 PROCEDURE — 87493 C DIFF AMPLIFIED PROBE: CPT | Performed by: PHYSICIAN ASSISTANT

## 2018-05-29 PROCEDURE — 87045 FECES CULTURE AEROBIC BACT: CPT | Performed by: PHYSICIAN ASSISTANT

## 2018-05-29 PROCEDURE — 74011250637 HC RX REV CODE- 250/637: Performed by: HOSPITALIST

## 2018-05-29 RX ORDER — MAGNESIUM SULFATE HEPTAHYDRATE 40 MG/ML
2 INJECTION, SOLUTION INTRAVENOUS
Status: COMPLETED | OUTPATIENT
Start: 2018-05-29 | End: 2018-05-31

## 2018-05-29 RX ADMIN — METRONIDAZOLE 500 MG: 500 INJECTION, SOLUTION INTRAVENOUS at 00:15

## 2018-05-29 RX ADMIN — Medication 500 MG: at 21:02

## 2018-05-29 RX ADMIN — OXYCODONE HYDROCHLORIDE AND ACETAMINOPHEN 2 TABLET: 5; 325 TABLET ORAL at 01:57

## 2018-05-29 RX ADMIN — HEPARIN SODIUM 5000 UNITS: 5000 INJECTION, SOLUTION INTRAVENOUS; SUBCUTANEOUS at 10:04

## 2018-05-29 RX ADMIN — CIPROFLOXACIN 400 MG: 2 INJECTION, SOLUTION INTRAVENOUS at 17:39

## 2018-05-29 RX ADMIN — OXYCODONE HYDROCHLORIDE AND ACETAMINOPHEN 2 TABLET: 5; 325 TABLET ORAL at 06:01

## 2018-05-29 RX ADMIN — MAGNESIUM SULFATE HEPTAHYDRATE 2 G: 40 INJECTION, SOLUTION INTRAVENOUS at 11:16

## 2018-05-29 RX ADMIN — LOSARTAN POTASSIUM 100 MG: 50 TABLET ORAL at 08:32

## 2018-05-29 RX ADMIN — ASPIRIN 81 MG: 81 TABLET, COATED ORAL at 08:32

## 2018-05-29 RX ADMIN — HEPARIN SODIUM 5000 UNITS: 5000 INJECTION, SOLUTION INTRAVENOUS; SUBCUTANEOUS at 17:40

## 2018-05-29 RX ADMIN — CIPROFLOXACIN 400 MG: 2 INJECTION, SOLUTION INTRAVENOUS at 06:01

## 2018-05-29 RX ADMIN — HYDROCHLOROTHIAZIDE 12.5 MG: 25 TABLET ORAL at 08:31

## 2018-05-29 RX ADMIN — HEPARIN SODIUM 5000 UNITS: 5000 INJECTION, SOLUTION INTRAVENOUS; SUBCUTANEOUS at 00:17

## 2018-05-29 RX ADMIN — OXYCODONE HYDROCHLORIDE AND ACETAMINOPHEN 2 TABLET: 5; 325 TABLET ORAL at 14:13

## 2018-05-29 RX ADMIN — OXYCODONE HYDROCHLORIDE AND ACETAMINOPHEN 2 TABLET: 5; 325 TABLET ORAL at 22:26

## 2018-05-29 RX ADMIN — METRONIDAZOLE 500 MG: 500 INJECTION, SOLUTION INTRAVENOUS at 08:33

## 2018-05-29 RX ADMIN — OXYCODONE HYDROCHLORIDE AND ACETAMINOPHEN 2 TABLET: 5; 325 TABLET ORAL at 18:16

## 2018-05-29 RX ADMIN — MAGNESIUM SULFATE HEPTAHYDRATE 2 G: 40 INJECTION, SOLUTION INTRAVENOUS at 13:28

## 2018-05-29 RX ADMIN — METOPROLOL SUCCINATE 25 MG: 25 TABLET, EXTENDED RELEASE ORAL at 21:02

## 2018-05-29 RX ADMIN — MAGNESIUM SULFATE HEPTAHYDRATE 2 G: 40 INJECTION, SOLUTION INTRAVENOUS at 12:20

## 2018-05-29 RX ADMIN — ROSUVASTATIN CALCIUM 40 MG: 10 TABLET, FILM COATED ORAL at 08:30

## 2018-05-29 RX ADMIN — Medication 500 MG: at 08:30

## 2018-05-29 RX ADMIN — PANTOPRAZOLE SODIUM 40 MG: 40 TABLET, DELAYED RELEASE ORAL at 08:31

## 2018-05-29 RX ADMIN — METRONIDAZOLE 500 MG: 500 INJECTION, SOLUTION INTRAVENOUS at 16:20

## 2018-05-29 RX ADMIN — METOPROLOL SUCCINATE 25 MG: 25 TABLET, EXTENDED RELEASE ORAL at 00:15

## 2018-05-29 RX ADMIN — SODIUM CHLORIDE 75 ML/HR: 900 INJECTION, SOLUTION INTRAVENOUS at 10:05

## 2018-05-29 RX ADMIN — OXYCODONE HYDROCHLORIDE AND ACETAMINOPHEN 2 TABLET: 5; 325 TABLET ORAL at 10:04

## 2018-05-29 NOTE — PROGRESS NOTES
Reason for Admission:   colitis                   RRAT Score:          Low; 12           Plan for utilizing home health:      TBD                    Likelihood of Readmission:  TBD                         Transition of Care Plan:                      Pt admitted for abdominal pain due to colitis. Pt with a history of HLD, HTN, GERD, Non-hodgkin's CA (in remission), & Meniere dz (left). Please encourage ambulation to assist with identifying an appropriate plan of care. CM continuing to follow. Care Management Interventions  PCP Verified by CM: Yes  Mode of Transport at Discharge:  Other (see comment) (Family)  Transition of Care Consult (CM Consult): Discharge Planning  Health Maintenance Reviewed: Yes  Confirm Follow Up Transport: Self  Discharge Location  Discharge Placement: Home with family assistance

## 2018-05-29 NOTE — PROGRESS NOTES
Hospitalist Progress Note-critical care note     Patient: Jonelle Catherine MRN: [id number]  CSN: [id number]    YOB: 1960  Age: 65 y.o. Sex: male    DOA: 5/28/2018 LOS:  LOS: 1 day            Chief complaint:colitiis . Hypomagnesemia,  gerd     Assessment/Plan         Hospital Problems  Date Reviewed: 2/13/2018          Codes Class Noted POA    Hypomagnesemia ICD-10-CM: E83.42  ICD-9-CM: 275.2  5/29/2018 Unknown        * (Principal)Colitis ICD-10-CM: K52.9  ICD-9-CM: 558.9  5/28/2018 Unknown        Hypokalemia ICD-10-CM: E87.6  ICD-9-CM: 276.8  5/28/2018 Unknown        Hypercholesteremia ICD-10-CM: E78.00  ICD-9-CM: 272.0  5/28/2018 Unknown        GERD (gastroesophageal reflux disease) ICD-10-CM: K21.9  ICD-9-CM: 530.81  5/28/2018 Unknown        HTN (hypertension) ICD-10-CM: I10  ICD-9-CM: 401.9  5/28/2018 Unknown        Probable sepsis ICD-10-CM: R68.89  ICD-9-CM: 780.99  5/28/2018 Unknown        NHL (non-Hodgkin's lymphoma) (HCC) (Chronic) ICD-10-CM: C85.90  ICD-9-CM: 202.80  4/27/2014 Yes        S/P splenectomy ICD-10-CM: Z90.81  ICD-9-CM: V45.79  4/27/2014 Yes            Colitis  c diff negative, continue iv flagyl and ciprol   Continue hydration   Pain control and  Mild iv hydration        Hypomagnesemia   Replacement          HTN, accelerated  Continue home medication.     HLD   Continue statin      NHL   On remission for years      S/p splenectomy       Possible sepsis    bcx so far negative    Wbc done to 24 K    Disposition :2-3     Subjective: still having abdomen pain, would like to try regular diet   Nurse; no acute issue     Father was at the bedside   Review of systems:    General: No fevers or chills. Cardiovascular: No chest pain or pressure. No palpitations. Pulmonary: No shortness of breath. Gastrointestinal: No nausea, vomiting.  Abdomen pain     Vital signs/Intake and Output:  Visit Vitals    /66 (BP 1 Location: Left arm, BP Patient Position: At rest)    Pulse 77    Temp 98.6 °F (37 °C)    Resp 18    Ht 5' 8\" (1.727 m)    Wt 83 kg (182 lb 15.7 oz)    SpO2 100%    BMI 27.82 kg/m2     Current Shift:     Last three shifts:  05/27 1901 - 05/29 0700  In: 3143 [I.V.:1090]  Out: 975 [Urine:975]    Physical Exam:  General: WD, WN. Alert, cooperative, no acute distress    HEENT: NC, Atraumatic. PERRLA, anicteric sclerae. Lungs: CTA Bilaterally. No Wheezing/Rhonchi/Rales. Heart:  Regular  rhythm,  No murmur, No Rubs, No Gallops  Abdomen: Soft, Non distended, mild tender.  +Bowel sounds,   Extremities: No c/c/e  Psych:   Not anxious or agitated. Neurologic:  No acute neurological deficit. Labs: Results:       Chemistry Recent Labs      05/29/18   0405  05/28/18   1410   GLU  107*  97   NA  142  139   K  3.7  3.4*   CL  107  100   CO2  25  27   BUN  7  8   CREA  0.72  0.86   CA  7.6*  8.9   AGAP  10  12   BUCR  10*  9*   AP   --   72   TP   --   7.4   ALB   --   3.8   GLOB   --   3.6   AGRAT   --   1.1      CBC w/Diff Recent Labs      05/29/18   0405  05/28/18   1410   WBC  24.4*  32.3*   RBC  4.09*  4.37*   HGB  12.8*  13.9   HCT  38.4  39.9   PLT  452*  514*   GRANS  69  74   LYMPH  16*  17*   EOS  1  0      Cardiac Enzymes No results for input(s): CPK, CKND1, SURENDRA in the last 72 hours. No lab exists for component: CKRMB, TROIP   Coagulation No results for input(s): PTP, INR, APTT in the last 72 hours. No lab exists for component: INREXT    Lipid Panel Lab Results   Component Value Date/Time    Cholesterol, total 91 04/28/2014 05:04 AM    HDL Cholesterol 41 04/28/2014 05:04 AM    LDL, calculated 38.4 04/28/2014 05:04 AM    VLDL, calculated 11.6 04/28/2014 05:04 AM    Triglyceride 58 04/28/2014 05:04 AM    CHOL/HDL Ratio 2.2 04/28/2014 05:04 AM      BNP No results for input(s): BNPP in the last 72 hours.    Liver Enzymes Recent Labs      05/28/18   1410   TP  7.4   ALB  3.8   AP  72   SGOT  28      Thyroid Studies No results found for: T4, T3U, TSH, TSHEXT Procedures/imaging: see electronic medical records for all procedures/Xrays and details which were not copied into this note but were reviewed prior to creation of Krista Powell MD

## 2018-05-29 NOTE — PROGRESS NOTES
2120- Notified Dr. Teresita Arriola of pt's abdominal pain 8/10 unrelieved with 1 tab of percocet 5-325 mg Q4H PRN currently ordered. Telephone order received: okay to give 2 tabs of percocet Q4H PRN. Shift summary: Pt c/o severe RLQ pain that has now \"spread up\" in the upper abdomen. 2 tabs of percocet given, pt rested afterwards. Had 2 small episodes of loose stool, sent to lab for tests. Mom at bedside.      Patient Vitals for the past 12 hrs:   Temp Pulse Resp BP SpO2   05/29/18 0329 98.2 °F (36.8 °C) 69 16 112/68 100 %   05/28/18 2316 98.8 °F (37.1 °C) 79 16 107/67 98 %

## 2018-05-30 LAB
ANION GAP SERPL CALC-SCNC: 7 MMOL/L (ref 3–18)
BACTERIA SPEC CULT: NORMAL
BASOPHILS # BLD: 0.1 K/UL (ref 0–0.06)
BASOPHILS NFR BLD: 0 % (ref 0–2)
BUN SERPL-MCNC: 4 MG/DL (ref 7–18)
BUN/CREAT SERPL: 5 (ref 12–20)
CALCIUM SERPL-MCNC: 8.1 MG/DL (ref 8.5–10.1)
CHLORIDE SERPL-SCNC: 102 MMOL/L (ref 100–108)
CO2 SERPL-SCNC: 32 MMOL/L (ref 21–32)
CREAT SERPL-MCNC: 0.82 MG/DL (ref 0.6–1.3)
DIFFERENTIAL METHOD BLD: ABNORMAL
EOSINOPHIL # BLD: 0.8 K/UL (ref 0–0.4)
EOSINOPHIL NFR BLD: 3 % (ref 0–5)
ERYTHROCYTE [DISTWIDTH] IN BLOOD BY AUTOMATED COUNT: 14.8 % (ref 11.6–14.5)
GLUCOSE SERPL-MCNC: 114 MG/DL (ref 74–99)
HCT VFR BLD AUTO: 38.7 % (ref 36–48)
HGB BLD-MCNC: 12.6 G/DL (ref 13–16)
LYMPHOCYTES # BLD: 3.8 K/UL (ref 0.9–3.6)
LYMPHOCYTES NFR BLD: 17 % (ref 21–52)
MAGNESIUM SERPL-MCNC: 2 MG/DL (ref 1.6–2.6)
MCH RBC QN AUTO: 30.9 PG (ref 24–34)
MCHC RBC AUTO-ENTMCNC: 32.6 G/DL (ref 31–37)
MCV RBC AUTO: 94.9 FL (ref 74–97)
MONOCYTES # BLD: 2.3 K/UL (ref 0.05–1.2)
MONOCYTES NFR BLD: 10 % (ref 3–10)
NEUTS SEG # BLD: 14.9 K/UL (ref 1.8–8)
NEUTS SEG NFR BLD: 70 % (ref 40–73)
PLATELET # BLD AUTO: 473 K/UL (ref 135–420)
PMV BLD AUTO: 9.4 FL (ref 9.2–11.8)
POTASSIUM SERPL-SCNC: 3.6 MMOL/L (ref 3.5–5.5)
RBC # BLD AUTO: 4.08 M/UL (ref 4.7–5.5)
SERVICE CMNT-IMP: NORMAL
SODIUM SERPL-SCNC: 141 MMOL/L (ref 136–145)
WBC # BLD AUTO: 21.8 K/UL (ref 4.6–13.2)

## 2018-05-30 PROCEDURE — 36415 COLL VENOUS BLD VENIPUNCTURE: CPT | Performed by: HOSPITALIST

## 2018-05-30 PROCEDURE — 85025 COMPLETE CBC W/AUTO DIFF WBC: CPT | Performed by: HOSPITALIST

## 2018-05-30 PROCEDURE — 80048 BASIC METABOLIC PNL TOTAL CA: CPT | Performed by: HOSPITALIST

## 2018-05-30 PROCEDURE — 74011250636 HC RX REV CODE- 250/636: Performed by: HOSPITALIST

## 2018-05-30 PROCEDURE — 74011250637 HC RX REV CODE- 250/637: Performed by: HOSPITALIST

## 2018-05-30 PROCEDURE — 83735 ASSAY OF MAGNESIUM: CPT | Performed by: HOSPITALIST

## 2018-05-30 PROCEDURE — 74011000250 HC RX REV CODE- 250: Performed by: HOSPITALIST

## 2018-05-30 PROCEDURE — 65270000029 HC RM PRIVATE

## 2018-05-30 PROCEDURE — 74011250637 HC RX REV CODE- 250/637: Performed by: INTERNAL MEDICINE

## 2018-05-30 RX ADMIN — OXYCODONE HYDROCHLORIDE AND ACETAMINOPHEN 2 TABLET: 5; 325 TABLET ORAL at 09:16

## 2018-05-30 RX ADMIN — CIPROFLOXACIN 400 MG: 2 INJECTION, SOLUTION INTRAVENOUS at 04:44

## 2018-05-30 RX ADMIN — ROSUVASTATIN CALCIUM 40 MG: 10 TABLET, FILM COATED ORAL at 08:44

## 2018-05-30 RX ADMIN — METRONIDAZOLE 500 MG: 500 INJECTION, SOLUTION INTRAVENOUS at 23:17

## 2018-05-30 RX ADMIN — HEPARIN SODIUM 5000 UNITS: 5000 INJECTION, SOLUTION INTRAVENOUS; SUBCUTANEOUS at 00:23

## 2018-05-30 RX ADMIN — OXYCODONE HYDROCHLORIDE AND ACETAMINOPHEN 2 TABLET: 5; 325 TABLET ORAL at 22:17

## 2018-05-30 RX ADMIN — HYDROCHLOROTHIAZIDE 12.5 MG: 25 TABLET ORAL at 08:44

## 2018-05-30 RX ADMIN — HEPARIN SODIUM 5000 UNITS: 5000 INJECTION, SOLUTION INTRAVENOUS; SUBCUTANEOUS at 17:38

## 2018-05-30 RX ADMIN — PANTOPRAZOLE SODIUM 40 MG: 40 TABLET, DELAYED RELEASE ORAL at 08:45

## 2018-05-30 RX ADMIN — ASPIRIN 81 MG: 81 TABLET, COATED ORAL at 08:44

## 2018-05-30 RX ADMIN — CIPROFLOXACIN 400 MG: 2 INJECTION, SOLUTION INTRAVENOUS at 17:39

## 2018-05-30 RX ADMIN — METRONIDAZOLE 500 MG: 500 INJECTION, SOLUTION INTRAVENOUS at 16:02

## 2018-05-30 RX ADMIN — METRONIDAZOLE 500 MG: 500 INJECTION, SOLUTION INTRAVENOUS at 00:23

## 2018-05-30 RX ADMIN — Medication 500 MG: at 22:11

## 2018-05-30 RX ADMIN — METOPROLOL SUCCINATE 25 MG: 25 TABLET, EXTENDED RELEASE ORAL at 22:11

## 2018-05-30 RX ADMIN — METRONIDAZOLE 500 MG: 500 INJECTION, SOLUTION INTRAVENOUS at 08:48

## 2018-05-30 RX ADMIN — OXYCODONE HYDROCHLORIDE AND ACETAMINOPHEN 2 TABLET: 5; 325 TABLET ORAL at 03:33

## 2018-05-30 RX ADMIN — Medication 500 MG: at 08:44

## 2018-05-30 RX ADMIN — OXYCODONE HYDROCHLORIDE AND ACETAMINOPHEN 2 TABLET: 5; 325 TABLET ORAL at 14:13

## 2018-05-30 RX ADMIN — OXYCODONE HYDROCHLORIDE AND ACETAMINOPHEN 2 TABLET: 5; 325 TABLET ORAL at 18:31

## 2018-05-30 RX ADMIN — LOSARTAN POTASSIUM 100 MG: 50 TABLET ORAL at 08:44

## 2018-05-30 RX ADMIN — HEPARIN SODIUM 5000 UNITS: 5000 INJECTION, SOLUTION INTRAVENOUS; SUBCUTANEOUS at 08:49

## 2018-05-30 NOTE — PROGRESS NOTES
Hospitalist Progress Note-critical care note     Patient: Maria Alejandra Beltran MRN: [id number]  CSN: [id number]    YOB: 1960  Age: 65 y.o. Sex: male    DOA: 5/28/2018 LOS:  LOS: 2 days            Chief complaint:colitiis . Hypomagnesemia,  gerd     Assessment/Plan         Hospital Problems  Date Reviewed: 2/13/2018          Codes Class Noted POA    Hypomagnesemia ICD-10-CM: E83.42  ICD-9-CM: 275.2  5/29/2018 Unknown        * (Principal)Colitis ICD-10-CM: K52.9  ICD-9-CM: 558.9  5/28/2018 Unknown        Hypokalemia ICD-10-CM: E87.6  ICD-9-CM: 276.8  5/28/2018 Unknown        Hypercholesteremia ICD-10-CM: E78.00  ICD-9-CM: 272.0  5/28/2018 Unknown        GERD (gastroesophageal reflux disease) ICD-10-CM: K21.9  ICD-9-CM: 530.81  5/28/2018 Unknown        HTN (hypertension) ICD-10-CM: I10  ICD-9-CM: 401.9  5/28/2018 Unknown        Probable sepsis ICD-10-CM: R68.89  ICD-9-CM: 780.99  5/28/2018 Unknown        NHL (non-Hodgkin's lymphoma) (HCC) (Chronic) ICD-10-CM: C85.90  ICD-9-CM: 202.80  4/27/2014 Yes        S/P splenectomy ICD-10-CM: Z90.81  ICD-9-CM: V45.79  4/27/2014 Yes            Colitis  Mild improving, still having abdomen pain   c diff negative, continue iv flagyl and ciprol   Pain control and  Mild iv hydration   Stool cx negative     Hypomagnesemia   Resolved          HTN, accelerated  Well controlled per home medication      HLD   Continue statin      NHL   On remission for years      S/p splenectomy       Possible sepsis    bcx so far negative    Wbc done to 24 K    Disposition :2-3     Subjective: feel better, still having abdomen pain while eating. Nurse; no acute issue       Review of systems:    General: No fevers or chills. Cardiovascular: No chest pain or pressure. No palpitations. Pulmonary: No shortness of breath. Gastrointestinal: No nausea, vomiting.  Abdomen pain better     Vital signs/Intake and Output:  Visit Vitals    /70 (BP 1 Location: Left arm, BP Patient Position: Sitting)    Pulse 70    Temp 98 °F (36.7 °C)    Resp 16    Ht 5' 8\" (1.727 m)    Wt 84 kg (185 lb 3.2 oz)    SpO2 94%    BMI 28.16 kg/m2     Current Shift:  05/30 0701 - 05/30 1900  In: -   Out: 400 [Urine:400]  Last three shifts:  05/28 1901 - 05/30 0700  In: 1390 [I.V.:1390]  Out: 1575 [Urine:1575]    Physical Exam:  General: WD, WN. Alert, cooperative, no acute distress    HEENT: NC, Atraumatic. PERRLA, anicteric sclerae. Lungs: CTA Bilaterally. No Wheezing/Rhonchi/Rales. Heart:  Regular  rhythm,  No murmur, No Rubs, No Gallops  Abdomen: Soft, Non distended, mild tender.  +Bowel sounds,   Extremities: No c/c/e  Psych:   Not anxious or agitated. Neurologic:  No acute neurological deficit. Labs: Results:       Chemistry Recent Labs      05/30/18 0110 05/29/18 0405 05/28/18   1410   GLU  114*  107*  97   NA  141  142  139   K  3.6  3.7  3.4*   CL  102  107  100   CO2  32  25  27   BUN  4*  7  8   CREA  0.82  0.72  0.86   CA  8.1*  7.6*  8.9   AGAP  7  10  12   BUCR  5*  10*  9*   AP   --    --   72   TP   --    --   7.4   ALB   --    --   3.8   GLOB   --    --   3.6   AGRAT   --    --   1.1      CBC w/Diff Recent Labs      05/30/18 0110 05/29/18   0405  05/28/18   1410   WBC  21.8*  24.4*  32.3*   RBC  4.08*  4.09*  4.37*   HGB  12.6*  12.8*  13.9   HCT  38.7  38.4  39.9   PLT  473*  452*  514*   GRANS  70  69  74   LYMPH  17*  16*  17*   EOS  3  1  0      Cardiac Enzymes No results for input(s): CPK, CKND1, SURENDRA in the last 72 hours. No lab exists for component: CKRMB, TROIP   Coagulation No results for input(s): PTP, INR, APTT in the last 72 hours.     No lab exists for component: INREXT, INREXT    Lipid Panel Lab Results   Component Value Date/Time    Cholesterol, total 91 04/28/2014 05:04 AM    HDL Cholesterol 41 04/28/2014 05:04 AM    LDL, calculated 38.4 04/28/2014 05:04 AM    VLDL, calculated 11.6 04/28/2014 05:04 AM    Triglyceride 58 04/28/2014 05:04 AM    CHOL/HDL Ratio 2.2 04/28/2014 05:04 AM      BNP No results for input(s): BNPP in the last 72 hours.    Liver Enzymes Recent Labs      05/28/18   1410   TP  7.4   ALB  3.8   AP  72   SGOT  28      Thyroid Studies No results found for: T4, T3U, TSH, TSHEXT, TSHEXT     Procedures/imaging: see electronic medical records for all procedures/Xrays and details which were not copied into this note but were reviewed prior to creation of Erick Dumont MD

## 2018-05-30 NOTE — PROGRESS NOTES
1600 - Patient in bed at this time. A/O x 4. IV to right AC  intact and patent. Heparin is DVT prophylaxis. Patient denies numbness/tingling. Pedal and radial pulses palpable. Lungs clear . Bowel sounds active to all quadrants. Pain 8/10. Patient is on tele box #37, NSR. Patient denies vertigo (from Meniere's). SOB or dizziness or chest pain. Patient educated on side effects, repeated back 4 side effects. 1800-patient ambulated in halllway with IV pole at this time. Patient denies SOB, chest pain or dizziness upon ambulating. Patient returned to bed, 150 ft ambulated. 1832- Pain 8/10. PRN 2 tablets percocet pain medication administered at this time. Patient has been educated on side effects. Shift Summary: Patient's pain well remained between 7-8/10 controlled this shift. IV remains intact. Heparin is DVT prophylaxis.

## 2018-05-30 NOTE — PROGRESS NOTES
Shift summary: No acute events. Pt stated pain to R abdomen is improving, although he still rates pain 7-8/10. 2 tabs of percocet given. Up ad blanca, voided.      Patient Vitals for the past 12 hrs:   Temp Pulse Resp BP SpO2   05/30/18 0716 98.1 °F (36.7 °C) 61 15 106/67 96 %   05/30/18 0323 97.9 °F (36.6 °C) 69 16 111/74 97 %   05/29/18 2218 99.4 °F (37.4 °C) 82 16 112/56 96 %

## 2018-05-30 NOTE — PROGRESS NOTES
Problem: Falls - Risk of  Goal: *Absence of Falls  Document Delia Fall Risk and appropriate interventions in the flowsheet.    Outcome: Progressing Towards Goal  Fall Risk Interventions:            Medication Interventions: Teach patient to arise slowly

## 2018-05-31 LAB
ANION GAP SERPL CALC-SCNC: 5 MMOL/L (ref 3–18)
BACTERIA SPEC CULT: NORMAL
BACTERIA SPEC CULT: NORMAL
BASOPHILS # BLD: 0 K/UL (ref 0–0.1)
BASOPHILS NFR BLD: 0 % (ref 0–3)
BUN SERPL-MCNC: 7 MG/DL (ref 7–18)
BUN/CREAT SERPL: 9 (ref 12–20)
CALCIUM SERPL-MCNC: 9.3 MG/DL (ref 8.5–10.1)
CHLORIDE SERPL-SCNC: 102 MMOL/L (ref 100–108)
CO2 SERPL-SCNC: 34 MMOL/L (ref 21–32)
CREAT SERPL-MCNC: 0.75 MG/DL (ref 0.6–1.3)
DIFFERENTIAL METHOD BLD: ABNORMAL
EOSINOPHIL # BLD: 0.8 K/UL (ref 0–0.4)
EOSINOPHIL NFR BLD: 5 % (ref 0–5)
ERYTHROCYTE [DISTWIDTH] IN BLOOD BY AUTOMATED COUNT: 14.5 % (ref 11.6–14.5)
GLUCOSE SERPL-MCNC: 130 MG/DL (ref 74–99)
HCT VFR BLD AUTO: 39.2 % (ref 36–48)
HGB BLD-MCNC: 12.9 G/DL (ref 13–16)
LYMPHOCYTES # BLD: 5.4 K/UL (ref 0.8–3.5)
LYMPHOCYTES NFR BLD: 33 % (ref 20–51)
MAGNESIUM SERPL-MCNC: 1.7 MG/DL (ref 1.6–2.6)
MCH RBC QN AUTO: 31 PG (ref 24–34)
MCHC RBC AUTO-ENTMCNC: 32.9 G/DL (ref 31–37)
MCV RBC AUTO: 94.2 FL (ref 74–97)
MONOCYTES # BLD: 0.8 K/UL (ref 0–1)
MONOCYTES NFR BLD: 5 % (ref 2–9)
NEUTS BAND NFR BLD MANUAL: 2 % (ref 0–5)
NEUTS SEG # BLD: 9 K/UL (ref 1.8–8)
NEUTS SEG NFR BLD: 55 % (ref 42–75)
PLATELET # BLD AUTO: 556 K/UL (ref 135–420)
PMV BLD AUTO: 9.5 FL (ref 9.2–11.8)
POTASSIUM SERPL-SCNC: 4.5 MMOL/L (ref 3.5–5.5)
RBC # BLD AUTO: 4.16 M/UL (ref 4.7–5.5)
RBC MORPH BLD: ABNORMAL
SERVICE CMNT-IMP: NORMAL
SODIUM SERPL-SCNC: 141 MMOL/L (ref 136–145)
WBC # BLD AUTO: 16.4 K/UL (ref 4.6–13.2)
WBC MORPH BLD: ABNORMAL

## 2018-05-31 PROCEDURE — 65270000029 HC RM PRIVATE

## 2018-05-31 PROCEDURE — 74011250637 HC RX REV CODE- 250/637: Performed by: INTERNAL MEDICINE

## 2018-05-31 PROCEDURE — 36415 COLL VENOUS BLD VENIPUNCTURE: CPT | Performed by: HOSPITALIST

## 2018-05-31 PROCEDURE — 74011250636 HC RX REV CODE- 250/636: Performed by: HOSPITALIST

## 2018-05-31 PROCEDURE — 85025 COMPLETE CBC W/AUTO DIFF WBC: CPT | Performed by: HOSPITALIST

## 2018-05-31 PROCEDURE — 74011250637 HC RX REV CODE- 250/637: Performed by: HOSPITALIST

## 2018-05-31 PROCEDURE — 80048 BASIC METABOLIC PNL TOTAL CA: CPT | Performed by: HOSPITALIST

## 2018-05-31 PROCEDURE — 74011000250 HC RX REV CODE- 250: Performed by: HOSPITALIST

## 2018-05-31 PROCEDURE — 83735 ASSAY OF MAGNESIUM: CPT | Performed by: HOSPITALIST

## 2018-05-31 RX ORDER — MAGNESIUM SULFATE HEPTAHYDRATE 40 MG/ML
2 INJECTION, SOLUTION INTRAVENOUS ONCE
Status: COMPLETED | OUTPATIENT
Start: 2018-05-31 | End: 2018-05-31

## 2018-05-31 RX ADMIN — CIPROFLOXACIN 400 MG: 2 INJECTION, SOLUTION INTRAVENOUS at 05:59

## 2018-05-31 RX ADMIN — LOSARTAN POTASSIUM 100 MG: 50 TABLET ORAL at 09:31

## 2018-05-31 RX ADMIN — ASPIRIN 81 MG: 81 TABLET, COATED ORAL at 09:31

## 2018-05-31 RX ADMIN — HEPARIN SODIUM 5000 UNITS: 5000 INJECTION, SOLUTION INTRAVENOUS; SUBCUTANEOUS at 02:50

## 2018-05-31 RX ADMIN — METRONIDAZOLE 500 MG: 500 INJECTION, SOLUTION INTRAVENOUS at 09:30

## 2018-05-31 RX ADMIN — METRONIDAZOLE 500 MG: 500 INJECTION, SOLUTION INTRAVENOUS at 19:30

## 2018-05-31 RX ADMIN — MAGNESIUM SULFATE HEPTAHYDRATE 2 G: 40 INJECTION, SOLUTION INTRAVENOUS at 15:36

## 2018-05-31 RX ADMIN — OXYCODONE HYDROCHLORIDE AND ACETAMINOPHEN 2 TABLET: 5; 325 TABLET ORAL at 15:34

## 2018-05-31 RX ADMIN — CIPROFLOXACIN 400 MG: 2 INJECTION, SOLUTION INTRAVENOUS at 21:03

## 2018-05-31 RX ADMIN — METOPROLOL SUCCINATE 25 MG: 25 TABLET, EXTENDED RELEASE ORAL at 21:03

## 2018-05-31 RX ADMIN — Medication 500 MG: at 21:03

## 2018-05-31 RX ADMIN — OXYCODONE HYDROCHLORIDE AND ACETAMINOPHEN 2 TABLET: 5; 325 TABLET ORAL at 19:34

## 2018-05-31 RX ADMIN — HEPARIN SODIUM 5000 UNITS: 5000 INJECTION, SOLUTION INTRAVENOUS; SUBCUTANEOUS at 19:25

## 2018-05-31 RX ADMIN — OXYCODONE HYDROCHLORIDE AND ACETAMINOPHEN 2 TABLET: 5; 325 TABLET ORAL at 06:50

## 2018-05-31 RX ADMIN — HYDROCHLOROTHIAZIDE 12.5 MG: 25 TABLET ORAL at 09:31

## 2018-05-31 RX ADMIN — PANTOPRAZOLE SODIUM 40 MG: 40 TABLET, DELAYED RELEASE ORAL at 09:31

## 2018-05-31 RX ADMIN — OXYCODONE HYDROCHLORIDE AND ACETAMINOPHEN 2 TABLET: 5; 325 TABLET ORAL at 02:50

## 2018-05-31 RX ADMIN — OXYCODONE HYDROCHLORIDE AND ACETAMINOPHEN 2 TABLET: 5; 325 TABLET ORAL at 23:45

## 2018-05-31 RX ADMIN — OXYCODONE HYDROCHLORIDE AND ACETAMINOPHEN 2 TABLET: 5; 325 TABLET ORAL at 11:21

## 2018-05-31 RX ADMIN — Medication 500 MG: at 09:31

## 2018-05-31 RX ADMIN — ROSUVASTATIN CALCIUM 40 MG: 10 TABLET, FILM COATED ORAL at 09:31

## 2018-05-31 RX ADMIN — HEPARIN SODIUM 5000 UNITS: 5000 INJECTION, SOLUTION INTRAVENOUS; SUBCUTANEOUS at 11:22

## 2018-05-31 NOTE — PROGRESS NOTES
Bedside shift change report given to Adam Fischer (oncoming nurse) by Kati Jernigan Rn (offgoing nurse). Report included the following information SBAR, Kardex, Intake/Output, MAR and Recent Results.

## 2018-05-31 NOTE — PROGRESS NOTES
Hospitalist Progress Note-critical care note     Patient: Cecilia Sims MRN: [id number]  CSN: [id number]    YOB: 1960  Age: 65 y.o. Sex: male    DOA: 5/28/2018 LOS:  LOS: 3 days            Chief complaint:colitis . Hypomagnesemia,  gerd     Assessment/Plan         Hospital Problems  Date Reviewed: 2/13/2018          Codes Class Noted POA    Hypomagnesemia ICD-10-CM: E83.42  ICD-9-CM: 275.2  5/29/2018 Unknown        * (Principal)Colitis ICD-10-CM: K52.9  ICD-9-CM: 558.9  5/28/2018 Unknown        Hypokalemia ICD-10-CM: E87.6  ICD-9-CM: 276.8  5/28/2018 Unknown        Hypercholesteremia ICD-10-CM: E78.00  ICD-9-CM: 272.0  5/28/2018 Unknown        GERD (gastroesophageal reflux disease) ICD-10-CM: K21.9  ICD-9-CM: 530.81  5/28/2018 Unknown        HTN (hypertension) ICD-10-CM: I10  ICD-9-CM: 401.9  5/28/2018 Unknown        Probable sepsis ICD-10-CM: R68.89  ICD-9-CM: 780.99  5/28/2018 Unknown        NHL (non-Hodgkin's lymphoma) (HCC) (Chronic) ICD-10-CM: C85.90  ICD-9-CM: 202.80  4/27/2014 Yes        S/P splenectomy ICD-10-CM: Z90.81  ICD-9-CM: V45.79  4/27/2014 Yes            Colitis  Continue improving,   c diff negative,   Stool cx negative    continue iv flagyl and ciprol   Pain control and  Mild iv hydration      Hypomagnesemia   Will give 2 g mg, due to continuing losing mg from gi          HTN, accelerated  Well controlled per current regimen      HLD   Continue statin      NHL   On remission for years      S/p splenectomy       Possible sepsis    bcx so far negative    Wbc done to 16    Disposition :1-2    Subjective: feel fine   Nurse; no acute issue       Review of systems:    General: No fevers or chills. Cardiovascular: No chest pain or pressure. No palpitations. Pulmonary: No shortness of breath. Gastrointestinal: No nausea, vomiting.     Vital signs/Intake and Output:  Visit Vitals    /67    Pulse 65    Temp 97.7 °F (36.5 °C)    Resp 16    Ht 5' 8\" (1.727 m)    Wt 83.6 kg (184 lb 3.2 oz)    SpO2 97%    BMI 28.01 kg/m2     Current Shift:  05/31 0701 - 05/31 1900  In: 240 [P.O.:240]  Out: 400 [Urine:400]  Last three shifts:  05/29 1901 - 05/31 0700  In: 4305 [P.O.:400; I.V.:950]  Out: 2325 [Urine:2325]    Physical Exam:  General: WD, WN. Alert, cooperative, no acute distress    HEENT: NC, Atraumatic. PERRLA, anicteric sclerae. Lungs: CTA Bilaterally. No Wheezing/Rhonchi/Rales. Heart:  Regular  rhythm,  No murmur, No Rubs, No Gallops  Abdomen: Soft, Non distended, mild tender.  +Bowel sounds,   Extremities: No c/c/e  Psych:   Not anxious or agitated. Neurologic:  No acute neurological deficit. Labs: Results:       Chemistry Recent Labs      05/31/18 0459 05/30/18 0110 05/29/18   0405  05/28/18   1410   GLU  130*  114*  107*  97   NA  141  141  142  139   K  4.5  3.6  3.7  3.4*   CL  102  102  107  100   CO2  34*  32  25  27   BUN  7  4*  7  8   CREA  0.75  0.82  0.72  0.86   CA  9.3  8.1*  7.6*  8.9   AGAP  5  7  10  12   BUCR  9*  5*  10*  9*   AP   --    --    --   72   TP   --    --    --   7.4   ALB   --    --    --   3.8   GLOB   --    --    --   3.6   AGRAT   --    --    --   1.1      CBC w/Diff Recent Labs      05/31/18 0459 05/30/18 0110 05/29/18   0405   WBC  16.4*  21.8*  24.4*   RBC  4.16*  4.08*  4.09*   HGB  12.9*  12.6*  12.8*   HCT  39.2  38.7  38.4   PLT  556*  473*  452*   GRANS  55  70  69   LYMPH  33  17*  16*   EOS  5  3  1      Cardiac Enzymes No results for input(s): CPK, CKND1, SURENDRA in the last 72 hours. No lab exists for component: CKRMB, TROIP   Coagulation No results for input(s): PTP, INR, APTT in the last 72 hours.     No lab exists for component: INREXT, INREXT    Lipid Panel Lab Results   Component Value Date/Time    Cholesterol, total 91 04/28/2014 05:04 AM    HDL Cholesterol 41 04/28/2014 05:04 AM    LDL, calculated 38.4 04/28/2014 05:04 AM    VLDL, calculated 11.6 04/28/2014 05:04 AM    Triglyceride 58 04/28/2014 05:04 AM CHOL/HDL Ratio 2.2 04/28/2014 05:04 AM      BNP No results for input(s): BNPP in the last 72 hours.    Liver Enzymes Recent Labs      05/28/18   1410   TP  7.4   ALB  3.8   AP  72   SGOT  28      Thyroid Studies No results found for: T4, T3U, TSH, TSHEXT, TSHEXT     Procedures/imaging: see electronic medical records for all procedures/Xrays and details which were not copied into this note but were reviewed prior to creation of Emir Osullivan MD

## 2018-05-31 NOTE — PROGRESS NOTES
CM continuing to follow. No plan of care needs gave been identified at this time. CM remains available. Anticipate pt will be medically stable for discharge with in the next 24-48 hours pending medical stability. Care Management Interventions  PCP Verified by CM: Yes  Mode of Transport at Discharge:  Other (see comment) (Family)  Transition of Care Consult (CM Consult): Discharge Planning  Health Maintenance Reviewed: Yes  Confirm Follow Up Transport: Self  Discharge Location  Discharge Placement: Home with family assistance

## 2018-06-01 VITALS
TEMPERATURE: 98.3 F | HEIGHT: 68 IN | OXYGEN SATURATION: 98 % | WEIGHT: 180.12 LBS | DIASTOLIC BLOOD PRESSURE: 84 MMHG | BODY MASS INDEX: 27.3 KG/M2 | RESPIRATION RATE: 18 BRPM | SYSTOLIC BLOOD PRESSURE: 131 MMHG | HEART RATE: 67 BPM

## 2018-06-01 LAB
ANION GAP SERPL CALC-SCNC: 8 MMOL/L (ref 3–18)
BASOPHILS # BLD: 0 K/UL (ref 0–0.1)
BASOPHILS NFR BLD: 0 % (ref 0–3)
BUN SERPL-MCNC: 7 MG/DL (ref 7–18)
BUN/CREAT SERPL: 10 (ref 12–20)
CALCIUM SERPL-MCNC: 8.9 MG/DL (ref 8.5–10.1)
CHLORIDE SERPL-SCNC: 101 MMOL/L (ref 100–108)
CO2 SERPL-SCNC: 33 MMOL/L (ref 21–32)
CREAT SERPL-MCNC: 0.7 MG/DL (ref 0.6–1.3)
DIFFERENTIAL METHOD BLD: ABNORMAL
EOSINOPHIL # BLD: 1.6 K/UL (ref 0–0.4)
EOSINOPHIL NFR BLD: 11 % (ref 0–5)
ERYTHROCYTE [DISTWIDTH] IN BLOOD BY AUTOMATED COUNT: 14.3 % (ref 11.6–14.5)
GLUCOSE SERPL-MCNC: 113 MG/DL (ref 74–99)
HCT VFR BLD AUTO: 41.5 % (ref 36–48)
HGB BLD-MCNC: 13.7 G/DL (ref 13–16)
LYMPHOCYTES # BLD: 3.1 K/UL (ref 0.8–3.5)
LYMPHOCYTES NFR BLD: 22 % (ref 20–51)
MAGNESIUM SERPL-MCNC: 1.8 MG/DL (ref 1.6–2.6)
MCH RBC QN AUTO: 31 PG (ref 24–34)
MCHC RBC AUTO-ENTMCNC: 33 G/DL (ref 31–37)
MCV RBC AUTO: 93.9 FL (ref 74–97)
MONOCYTES # BLD: 1.7 K/UL (ref 0–1)
MONOCYTES NFR BLD: 12 % (ref 2–9)
NEUTS BAND NFR BLD MANUAL: 7 % (ref 0–5)
NEUTS SEG # BLD: 6.8 K/UL (ref 1.8–8)
NEUTS SEG NFR BLD: 48 % (ref 42–75)
PLATELET # BLD AUTO: 613 K/UL (ref 135–420)
PMV BLD AUTO: 9.3 FL (ref 9.2–11.8)
POTASSIUM SERPL-SCNC: 3.4 MMOL/L (ref 3.5–5.5)
RBC # BLD AUTO: 4.42 M/UL (ref 4.7–5.5)
RBC MORPH BLD: ABNORMAL
SODIUM SERPL-SCNC: 142 MMOL/L (ref 136–145)
WBC # BLD AUTO: 14.1 K/UL (ref 4.6–13.2)
WBC MORPH BLD: ABNORMAL

## 2018-06-01 PROCEDURE — 85025 COMPLETE CBC W/AUTO DIFF WBC: CPT | Performed by: HOSPITALIST

## 2018-06-01 PROCEDURE — 74011250636 HC RX REV CODE- 250/636: Performed by: HOSPITALIST

## 2018-06-01 PROCEDURE — 80048 BASIC METABOLIC PNL TOTAL CA: CPT | Performed by: HOSPITALIST

## 2018-06-01 PROCEDURE — 83735 ASSAY OF MAGNESIUM: CPT | Performed by: HOSPITALIST

## 2018-06-01 PROCEDURE — 74011250637 HC RX REV CODE- 250/637: Performed by: HOSPITALIST

## 2018-06-01 PROCEDURE — 36415 COLL VENOUS BLD VENIPUNCTURE: CPT | Performed by: HOSPITALIST

## 2018-06-01 PROCEDURE — 74011000250 HC RX REV CODE- 250: Performed by: HOSPITALIST

## 2018-06-01 PROCEDURE — 74011250637 HC RX REV CODE- 250/637: Performed by: INTERNAL MEDICINE

## 2018-06-01 RX ORDER — MAGNESIUM SULFATE HEPTAHYDRATE 40 MG/ML
2 INJECTION, SOLUTION INTRAVENOUS ONCE
Status: COMPLETED | OUTPATIENT
Start: 2018-06-01 | End: 2018-06-01

## 2018-06-01 RX ORDER — POTASSIUM CHLORIDE 20 MEQ/1
40 TABLET, EXTENDED RELEASE ORAL
Status: COMPLETED | OUTPATIENT
Start: 2018-06-01 | End: 2018-06-01

## 2018-06-01 RX ORDER — METRONIDAZOLE 500 MG/1
500 TABLET ORAL 3 TIMES DAILY
Qty: 30 TAB | Refills: 0 | Status: SHIPPED | OUTPATIENT
Start: 2018-06-01 | End: 2018-06-11

## 2018-06-01 RX ORDER — SAME BUTANEDISULFONATE/BETAINE 400-600 MG
500 POWDER IN PACKET (EA) ORAL 2 TIMES DAILY
Qty: 48 CAP | Refills: 0 | Status: SHIPPED | OUTPATIENT
Start: 2018-06-01 | End: 2018-06-13

## 2018-06-01 RX ORDER — CIPROFLOXACIN 500 MG/1
500 TABLET ORAL 2 TIMES DAILY
Qty: 20 TAB | Refills: 0 | Status: SHIPPED | OUTPATIENT
Start: 2018-06-01 | End: 2018-06-11

## 2018-06-01 RX ORDER — OXYCODONE AND ACETAMINOPHEN 5; 325 MG/1; MG/1
1 TABLET ORAL
Qty: 10 TAB | Refills: 0 | Status: ON HOLD | OUTPATIENT
Start: 2018-06-01 | End: 2021-09-27

## 2018-06-01 RX ADMIN — MAGNESIUM SULFATE HEPTAHYDRATE 2 G: 40 INJECTION, SOLUTION INTRAVENOUS at 08:26

## 2018-06-01 RX ADMIN — Medication 500 MG: at 08:25

## 2018-06-01 RX ADMIN — METRONIDAZOLE 500 MG: 500 INJECTION, SOLUTION INTRAVENOUS at 03:34

## 2018-06-01 RX ADMIN — ROSUVASTATIN CALCIUM 40 MG: 10 TABLET, FILM COATED ORAL at 08:25

## 2018-06-01 RX ADMIN — HEPARIN SODIUM 5000 UNITS: 5000 INJECTION, SOLUTION INTRAVENOUS; SUBCUTANEOUS at 03:34

## 2018-06-01 RX ADMIN — OXYCODONE HYDROCHLORIDE AND ACETAMINOPHEN 2 TABLET: 5; 325 TABLET ORAL at 12:37

## 2018-06-01 RX ADMIN — LOSARTAN POTASSIUM 100 MG: 50 TABLET ORAL at 08:25

## 2018-06-01 RX ADMIN — OXYCODONE HYDROCHLORIDE AND ACETAMINOPHEN 2 TABLET: 5; 325 TABLET ORAL at 08:24

## 2018-06-01 RX ADMIN — METRONIDAZOLE 500 MG: 500 INJECTION, SOLUTION INTRAVENOUS at 11:02

## 2018-06-01 RX ADMIN — PANTOPRAZOLE SODIUM 40 MG: 40 TABLET, DELAYED RELEASE ORAL at 08:25

## 2018-06-01 RX ADMIN — HYDROCHLOROTHIAZIDE 12.5 MG: 25 TABLET ORAL at 08:26

## 2018-06-01 RX ADMIN — POTASSIUM CHLORIDE 40 MEQ: 20 TABLET, EXTENDED RELEASE ORAL at 08:25

## 2018-06-01 RX ADMIN — CIPROFLOXACIN 400 MG: 2 INJECTION, SOLUTION INTRAVENOUS at 10:01

## 2018-06-01 RX ADMIN — HEPARIN SODIUM 5000 UNITS: 5000 INJECTION, SOLUTION INTRAVENOUS; SUBCUTANEOUS at 10:02

## 2018-06-01 RX ADMIN — ASPIRIN 81 MG: 81 TABLET, COATED ORAL at 08:25

## 2018-06-01 NOTE — PROGRESS NOTES
8:38 AM  Pt is awake, alert, oriented x 4. Siting at edge of bed. Complains of right lower quadrant pain. Medicated with percocet 2 tabs po. Seen by Dr Luiza Momin this morning. Pt for discharge today after doses of cipro and flagyl. Pt given Potassium 40 meq po for K+ level of 3.4 and Magnesium 2 gm for Mg level 1.8. Lungs are clear. Abdomen soft with hypoactive BS.    12:38 PM   Discharge instructions were given by Armin Cervantes RN. Pt medicated with Percocet 2 tabd po for pain level 6/10 on RLQ abdomen. 1332  Pt discharged per wheelchair accompanied by family member.

## 2018-06-01 NOTE — PROGRESS NOTES
1700 received pt sitting up on side of bed, alert and oriented x 4, lungs clear. BS hypoactive. No numbness or tingling to extremities. States pain 5/10 R/S abdomen. Dinner ordered. Family at side. Call bell within reach. 1830- pt ambulating in crespo. 1930- pt C/O pain in abdomen R/S Percocet 2 tabs PO given for pain. Position for comfort in bed. Call bell within reach.

## 2018-06-01 NOTE — PROGRESS NOTES
SHIFT SUMMARY: No events. Pt continues to have abdominal pain, but states he is trying to take pain medication less. Redness on RLQ abd still present but he states that it is improving. On telemetry box 37.

## 2018-06-01 NOTE — DISCHARGE INSTRUCTIONS
DISCHARGE SUMMARY from Nurse    PATIENT INSTRUCTIONS:    After general anesthesia or intravenous sedation, for 24 hours or while taking prescription Narcotics:  · Limit your activities  · Do not drive and operate hazardous machinery  · Do not make important personal or business decisions  · Do  not drink alcoholic beverages  · If you have not urinated within 8 hours after discharge, please contact your surgeon on call. Report the following to your surgeon:  · Excessive pain, swelling, redness or odor of or around the surgical area  · Temperature over 100.5  · Nausea and vomiting lasting longer than 4 hours or if unable to take medications  · Any signs of decreased circulation or nerve impairment to extremity: change in color, persistent  numbness, tingling, coldness or increase pain  · Any questions    What to do at Home:  Recommended activity: Activity as tolerated and Activity as tolerated and no driving for today. If you experience any of the following symptoms fever, unrelieved pain and excessive bleeding see the doctor please follow up with PCP after two weeks. *  Please give a list of your current medications to your Primary Care Provider. *  Please update this list whenever your medications are discontinued, doses are      changed, or new medications (including over-the-counter products) are added. *  Please carry medication information at all times in case of emergency situations. These are general instructions for a healthy lifestyle:    No smoking/ No tobacco products/ Avoid exposure to second hand smoke  Surgeon General's Warning:  Quitting smoking now greatly reduces serious risk to your health.     Obesity, smoking, and sedentary lifestyle greatly increases your risk for illness    A healthy diet, regular physical exercise & weight monitoring are important for maintaining a healthy lifestyle    You may be retaining fluid if you have a history of heart failure or if you experience any of the following symptoms:  Weight gain of 3 pounds or more overnight or 5 pounds in a week, increased swelling in our hands or feet or shortness of breath while lying flat in bed. Please call your doctor as soon as you notice any of these symptoms; do not wait until your next office visit. Recognize signs and symptoms of STROKE:    F-face looks uneven    A-arms unable to move or move unevenly    S-speech slurred or non-existent    T-time-call 911 as soon as signs and symptoms begin-DO NOT go       Back to bed or wait to see if you get better-TIME IS BRAIN. Warning Signs of HEART ATTACK     Call 911 if you have these symptoms:   Chest discomfort. Most heart attacks involve discomfort in the center of the chest that lasts more than a few minutes, or that goes away and comes back. It can feel like uncomfortable pressure, squeezing, fullness, or pain.  Discomfort in other areas of the upper body. Symptoms can include pain or discomfort in one or both arms, the back, neck, jaw, or stomach.  Shortness of breath with or without chest discomfort.  Other signs may include breaking out in a cold sweat, nausea, or lightheadedness. Don't wait more than five minutes to call 911 - MINUTES MATTER! Fast action can save your life. Calling 911 is almost always the fastest way to get lifesaving treatment. Emergency Medical Services staff can begin treatment when they arrive -- up to an hour sooner than if someone gets to the hospital by car. The discharge information has been reviewed with the patient. The patient verbalized understanding. Discharge medications reviewed with the patient and appropriate educational materials and side effects teaching were provided.   ___________________________________________________________________________________________________________________________________

## 2018-06-01 NOTE — DISCHARGE SUMMARY
Discharge Summary    Patient: Mouna Garcia MRN: [id number]  CSN: [id number]    YOB: 1960  Age: 65 y.o. Sex: male    DOA: 5/28/2018 LOS:  LOS: 4 days   Discharge Date:      Primary Care Provider:  Leland Ponce III, MD    Admission Diagnoses: Colitis    Discharge Diagnoses:    Hospital Problems  Date Reviewed: 2/13/2018          Codes Class Noted POA    Hypomagnesemia ICD-10-CM: E83.42  ICD-9-CM: 275.2  5/29/2018 Unknown        * (Principal)Colitis ICD-10-CM: K52.9  ICD-9-CM: 558.9  5/28/2018 Unknown        Hypokalemia ICD-10-CM: E87.6  ICD-9-CM: 276.8  5/28/2018 Unknown        Hypercholesteremia ICD-10-CM: E78.00  ICD-9-CM: 272.0  5/28/2018 Unknown        GERD (gastroesophageal reflux disease) ICD-10-CM: K21.9  ICD-9-CM: 530.81  5/28/2018 Unknown        HTN (hypertension) ICD-10-CM: I10  ICD-9-CM: 401.9  5/28/2018 Unknown        Probable sepsis ICD-10-CM: R68.89  ICD-9-CM: 780.99  5/28/2018 Unknown        [facility]odgkin's [facility] (Chronic) ICD-10-CM: C85.90  ICD-9-CM: 202.80  4/27/2014 Yes        S/P splenectomy ICD-10-CM: Z90.81  ICD-9-CM: V45.79  4/27/2014 Yes              Discharge Condition: Stable    Discharge Medications:     Current Discharge Medication List      START taking these medications    Details   Saccharomyces boulardii (FLORASTOR) 250 mg capsule Take 2 Caps by mouth two (2) times a day for 12 days. Qty: 48 Cap, Refills: 0      ciprofloxacin HCl (CIPRO) 500 mg tablet Take 1 Tab by mouth two (2) times a day for 10 days. Qty: 20 Tab, Refills: 0      metroNIDAZOLE (FLAGYL) 500 mg tablet Take 1 Tab by mouth three (3) times daily for 10 days. Qty: 30 Tab, Refills: 0         CONTINUE these medications which have CHANGED    Details   oxyCODONE-acetaminophen (PERCOCET) 5-325 mg per tablet Take 1 Tab by mouth every four (4) hours as needed. Max Daily Amount: 6 Tabs.   Qty: 10 Tab, Refills: 0    Associated Diagnoses: Colitis; Radiculopathy affecting upper extremity CONTINUE these medications which have NOT CHANGED    Details   aspirin delayed-release 81 mg tablet Take  by mouth daily. losartan-hydroCHLOROthiazide (HYZAAR) 100-12.5 mg per tablet Take 1 Tab by mouth daily. Indications: hypertension      metoprolol succinate (TOPROL-XL) 25 mg XL tablet Take 25 mg by mouth nightly. Indications: hypertension      rosuvastatin (CRESTOR) 40 mg tablet Take 40 mg by mouth daily. Indications: hypercholesterolemia      MULTIVITAMINS WITH FLUORIDE (MULTI-VITAMIN PO) Take  by mouth daily. METHYLCELLULOSE (FIBER LAXATIVE PO) Take 5 Caps by mouth two (2) times a day. esomeprazole (NEXIUM) 20 mg capsule Take 40 mg by mouth daily. Indications: gastroesophageal reflux disease             Procedures : none     Consults: None      PHYSICAL EXAM   Visit Vitals    /76    Pulse 62    Temp 98.2 °F (36.8 °C)    Resp 16    Ht 5' 8\" (1.727 m)    Wt 81.7 kg (180 lb 1.9 oz)    SpO2 94%    BMI 27.39 kg/m2     General: Awake, cooperative, no acute distress    HEENT: NC, Atraumatic. PERRLA, EOMI. Anicteric sclerae. Lungs:  CTA Bilaterally. No Wheezing/Rhonchi/Rales. Heart:  Regular  rhythm,  No murmur, No Rubs, No Gallops  Abdomen: Soft, Non distended, mild tender. +Bowel sounds,   Extremities: No c/c/e  Psych:   Not anxious or agitated. Neurologic:  No acute neurological deficits. Admission HPI :  Charito Franklin is a 65 y.o. male who hx of nhl on remission, recent abx use, htn, hld, s/p splenectomy came to er due to acute abdomen pain today. Located  RLQ, associated with fever/chills/multiple small stools -no bloody in it. Ct abdomen indicate colitis and wbc  32.3K. He had been treated with abx for RUDY and completed abx yesterday. He got his appendix removed years ago, reported no appetite.  His lactic acid was 2.6, fluid bolus given and down to 1.8      Denies any slurred speech/headache/cp/n/v/blurred vission/d/c/palpitation/gait change/bleeding. Denies smoking/ any alcohol or drug use. Hospital Course : Iv cipro and flagyl were administrated for colitis. Stool study, c diff negative and stool cx negative. With the treatment, his leukocytosis from 32 K down to 14 and pain is better, tolerated diet well. Mg and K were replaced for hypomagnesemia and hypokalemia. BP was well controlled per home medication.       He remained hemodynamic stable and will be d.c home and need complete abx for total 14 days treatment. Activity: Activity as tolerated    Diet: Regular Diet    [facility]: [facility]     Disposition:home     Minutes spent on discharge: 50 min       Labs: Results:       Chemistry Recent Labs      06/01/18 0528 05/31/18 0459 05/30/18   0110   GLU  113*  130*  114*   NA  142  141  141   K  3.4*  4.5  3.6   CL  101  102  102   CO2  33*  34*  32   BUN  7  7  4*   CREA  0.70  0.75  0.82   CA  8.9  9.3  8.1*   AGAP  8  5  7   BUCR  10*  9*  5*      CBC w/Diff Recent Labs      06/01/18 0528 05/31/18 0459 05/30/18   0110   WBC  14.1*  16.4*  21.8*   RBC  4.42*  4.16*  4.08*   HGB  13.7  12.9*  12.6*   HCT  41.5  39.2  38.7   PLT  613*  556*  473*   GRANS  48  55  70   LYMPH  22  33  17*   EOS  11*  5  3      Cardiac Enzymes No results for input(s): CPK, CKND1, SURENDRA in the last 72 hours. No lab exists for component: CKRMB, TROIP   Coagulation No results for input(s): PTP, INR, APTT in the last 72 hours. No lab exists for component: INREXT    Lipid Panel Lab Results   Component Value Date/Time    Cholesterol, total 91 04/28/2014 05:04 AM    HDL Cholesterol 41 04/28/2014 05:04 AM    LDL, calculated 38.4 04/28/2014 05:04 AM    VLDL, calculated 11.6 04/28/2014 05:04 AM    Triglyceride 58 04/28/2014 05:04 AM    CHOL/HDL Ratio 2.2 04/28/2014 05:04 AM      BNP No results for input(s): BNPP in the last 72 hours. Liver Enzymes No results for input(s): TP, ALB, TBIL, AP, SGOT, GPT in the last 72 hours.     No lab exists for component: DBIL   Thyroid Studies No results found for: T4, T3U, TSH, TSHEXT         Significant Diagnostic Studies: Ct Abd Pelv W Cont    Result Date: 5/28/2018  EXAM: CT of the abdomen and pelvis INDICATION: Abdominal pain with fever COMPARISON: CT abdomen 5/23/2014. CT abdomen and pelvis 4/27/2014 TECHNIQUE: Axial CT imaging of the abdomen and pelvis was performed with intravenous contrast. Multiplanar reformats were generated. One or more dose reduction techniques were used on this CT: automated exposure control, adjustment of the mAs and/or kVp according to patient's size, and iterative reconstruction techniques. The specific techniques utilized on this CT exam have been documented in the patient's electronic medical record. _______________ FINDINGS: LOWER CHEST: Bilateral lower lobe and lingular scarring. LIVER, BILIARY: Liver is mildly decreased in attenuation. Subcentimeter hypodensity superior right lobe is unchanged and probably represents a cyst. No biliary dilation. Gallbladder is unremarkable. PANCREAS: Normal. SPLEEN: Spleen appears to be surgically absent. A small splenule is unchanged in the splenic fossa. ADRENALS: Normal. KIDNEYS: Stable left renal cyst. Kidneys are otherwise normal. LYMPH NODES: No enlarged lymph nodes. Surgical clips in the retroperitoneum probably from prior lymph node dissection. GASTROINTESTINAL TRACT: There is mural thickening involving the descending colon and transverse colon up to the splenic flexure. Cecum appears to be spared as does the left colon beyond the splenic flexure. Findings are consistent with colitis. No evidence of bowel obstruction. PELVIC ORGANS: Unremarkable. VASCULATURE: Mild atherosclerotic changes without evidence of aneurysm. . BONES: No acute or aggressive osseous abnormalities identified. OTHER: No free fluid. _______________     IMPRESSION: 1. Findings consistent with colitis involving the ascending and transverse colon. Etiology uncertain.  2. Hepatic steatosis. 3. Prior splenectomy. Xr Chest Port    Result Date: 5/28/2018  EXAM: Chest portable INDICATION: Fever and abdominal pain COMPARISON: Two-view chest 4/27/2014 _______________ FINDINGS: AP portable chest film was performed. Stable bilateral lower lobe scarring. No acute infiltrate, effusion or pneumothorax. Heart and pulmonary vascularity are normal. Prior cervical fusion. Surgical clips left epigastric region. _______________     IMPRESSION: 1. Stable chest with bilateral lower lobe scarring. No acute changes.              [facility]     CC: Hu Becerra III, MD

## 2018-06-03 LAB
BACTERIA SPEC CULT: NORMAL
BACTERIA SPEC CULT: NORMAL
O+P SPEC MICRO: NORMAL
O+P STL CONC: NORMAL
SERVICE CMNT-IMP: NORMAL
SERVICE CMNT-IMP: NORMAL
SPECIMEN SOURCE: NORMAL

## 2020-10-10 ENCOUNTER — APPOINTMENT (OUTPATIENT)
Dept: GENERAL RADIOLOGY | Age: 60
DRG: 439 | End: 2020-10-10
Attending: PHYSICIAN ASSISTANT
Payer: COMMERCIAL

## 2020-10-10 ENCOUNTER — HOSPITAL ENCOUNTER (INPATIENT)
Age: 60
LOS: 3 days | Discharge: HOME | DRG: 439 | End: 2020-10-13
Attending: EMERGENCY MEDICINE | Admitting: INTERNAL MEDICINE
Payer: COMMERCIAL

## 2020-10-10 ENCOUNTER — APPOINTMENT (OUTPATIENT)
Dept: CT IMAGING | Age: 60
DRG: 439 | End: 2020-10-10
Attending: PHYSICIAN ASSISTANT
Payer: COMMERCIAL

## 2020-10-10 DIAGNOSIS — Z20.822 SUSPECTED COVID-19 VIRUS INFECTION: ICD-10-CM

## 2020-10-10 DIAGNOSIS — K85.90 ACUTE PANCREATITIS WITHOUT INFECTION OR NECROSIS, UNSPECIFIED PANCREATITIS TYPE: Primary | ICD-10-CM

## 2020-10-10 PROBLEM — E87.6 HYPOKALEMIA: Status: RESOLVED | Noted: 2018-05-28 | Resolved: 2020-10-10

## 2020-10-10 PROBLEM — E83.42 HYPOMAGNESEMIA: Status: RESOLVED | Noted: 2018-05-29 | Resolved: 2020-10-10

## 2020-10-10 PROBLEM — K52.9 COLITIS: Status: RESOLVED | Noted: 2018-05-28 | Resolved: 2020-10-10

## 2020-10-10 LAB
ALBUMIN SERPL-MCNC: 3.6 G/DL (ref 3.4–5)
ALBUMIN/GLOB SERPL: 0.9 {RATIO} (ref 0.8–1.7)
ALP SERPL-CCNC: 64 U/L (ref 45–117)
ALT SERPL-CCNC: 41 U/L (ref 16–61)
AMORPH CRY URNS QL MICRO: ABNORMAL
ANION GAP SERPL CALC-SCNC: 9 MMOL/L (ref 3–18)
APPEARANCE UR: CLEAR
AST SERPL-CCNC: 23 U/L (ref 10–38)
ATRIAL RATE: 98 BPM
BACTERIA URNS QL MICRO: ABNORMAL /HPF
BASOPHILS # BLD: 0 K/UL (ref 0–0.1)
BASOPHILS NFR BLD: 0 % (ref 0–3)
BILIRUB SERPL-MCNC: 0.7 MG/DL (ref 0.2–1)
BILIRUB UR QL: ABNORMAL
BUN SERPL-MCNC: 15 MG/DL (ref 7–18)
BUN/CREAT SERPL: 17 (ref 12–20)
CALCIUM SERPL-MCNC: 9.6 MG/DL (ref 8.5–10.1)
CALCULATED P AXIS, ECG09: 50 DEGREES
CALCULATED R AXIS, ECG10: -78 DEGREES
CALCULATED T AXIS, ECG11: 32 DEGREES
CHLORIDE SERPL-SCNC: 97 MMOL/L (ref 100–111)
CO2 SERPL-SCNC: 30 MMOL/L (ref 21–32)
COLOR UR: ABNORMAL
CREAT SERPL-MCNC: 0.87 MG/DL (ref 0.6–1.3)
DIAGNOSIS, 93000: NORMAL
DIFFERENTIAL METHOD BLD: ABNORMAL
EOSINOPHIL # BLD: 0.2 K/UL (ref 0–0.4)
EOSINOPHIL NFR BLD: 1 % (ref 0–5)
EPITH CASTS URNS QL MICRO: ABNORMAL /LPF (ref 0–5)
ERYTHROCYTE [DISTWIDTH] IN BLOOD BY AUTOMATED COUNT: 13.8 % (ref 11.6–14.5)
GLOBULIN SER CALC-MCNC: 3.9 G/DL (ref 2–4)
GLUCOSE SERPL-MCNC: 98 MG/DL (ref 74–99)
GLUCOSE UR STRIP.AUTO-MCNC: NEGATIVE MG/DL
HCT VFR BLD AUTO: 49.6 % (ref 36–48)
HGB BLD-MCNC: 16.7 G/DL (ref 13–16)
HGB UR QL STRIP: ABNORMAL
KETONES UR QL STRIP.AUTO: NEGATIVE MG/DL
LACTATE SERPL-SCNC: 0.8 MMOL/L (ref 0.4–2)
LEUKOCYTE ESTERASE UR QL STRIP.AUTO: ABNORMAL
LIPASE SERPL-CCNC: 781 U/L (ref 73–393)
LYMPHOCYTES # BLD: 4.7 K/UL (ref 0.8–3.5)
LYMPHOCYTES NFR BLD: 23 % (ref 20–51)
MCH RBC QN AUTO: 33.1 PG (ref 24–34)
MCHC RBC AUTO-ENTMCNC: 33.7 G/DL (ref 31–37)
MCV RBC AUTO: 98.2 FL (ref 74–97)
MONOCYTES # BLD: 1.4 K/UL (ref 0–1)
MONOCYTES NFR BLD: 7 % (ref 2–9)
MUCOUS THREADS URNS QL MICRO: ABNORMAL /LPF
NEUTS BAND NFR BLD MANUAL: 1 % (ref 0–5)
NEUTS SEG # BLD: 13.8 K/UL (ref 1.8–8)
NEUTS SEG NFR BLD: 68 % (ref 42–75)
NITRITE UR QL STRIP.AUTO: NEGATIVE
P-R INTERVAL, ECG05: 148 MS
PH UR STRIP: 5.5 [PH] (ref 5–8)
PLATELET # BLD AUTO: 407 K/UL (ref 135–420)
PLATELET COMMENTS,PCOM: ABNORMAL
PMV BLD AUTO: 9.4 FL (ref 9.2–11.8)
POTASSIUM SERPL-SCNC: 4 MMOL/L (ref 3.5–5.5)
PROT SERPL-MCNC: 7.5 G/DL (ref 6.4–8.2)
PROT UR STRIP-MCNC: 300 MG/DL
Q-T INTERVAL, ECG07: 342 MS
QRS DURATION, ECG06: 98 MS
QTC CALCULATION (BEZET), ECG08: 436 MS
RBC # BLD AUTO: 5.05 M/UL (ref 4.7–5.5)
RBC #/AREA URNS HPF: ABNORMAL /HPF (ref 0–5)
RBC MORPH BLD: ABNORMAL
SODIUM SERPL-SCNC: 136 MMOL/L (ref 136–145)
SP GR UR REFRACTOMETRY: 1.03 (ref 1–1.03)
UROBILINOGEN UR QL STRIP.AUTO: 1 EU/DL (ref 0.2–1)
VENTRICULAR RATE, ECG03: 98 BPM
WBC # BLD AUTO: 20.3 K/UL (ref 4.6–13.2)
WBC URNS QL MICRO: ABNORMAL /HPF (ref 0–5)

## 2020-10-10 PROCEDURE — 83690 ASSAY OF LIPASE: CPT

## 2020-10-10 PROCEDURE — 87040 BLOOD CULTURE FOR BACTERIA: CPT

## 2020-10-10 PROCEDURE — 93005 ELECTROCARDIOGRAM TRACING: CPT

## 2020-10-10 PROCEDURE — 85025 COMPLETE CBC W/AUTO DIFF WBC: CPT

## 2020-10-10 PROCEDURE — 81001 URINALYSIS AUTO W/SCOPE: CPT

## 2020-10-10 PROCEDURE — 99285 EMERGENCY DEPT VISIT HI MDM: CPT

## 2020-10-10 PROCEDURE — 74011000636 HC RX REV CODE- 636: Performed by: EMERGENCY MEDICINE

## 2020-10-10 PROCEDURE — 74011250636 HC RX REV CODE- 250/636: Performed by: INTERNAL MEDICINE

## 2020-10-10 PROCEDURE — 84478 ASSAY OF TRIGLYCERIDES: CPT

## 2020-10-10 PROCEDURE — 74011000258 HC RX REV CODE- 258: Performed by: PHYSICIAN ASSISTANT

## 2020-10-10 PROCEDURE — 74011250636 HC RX REV CODE- 250/636: Performed by: PHYSICIAN ASSISTANT

## 2020-10-10 PROCEDURE — 96365 THER/PROPH/DIAG IV INF INIT: CPT

## 2020-10-10 PROCEDURE — 80053 COMPREHEN METABOLIC PANEL: CPT

## 2020-10-10 PROCEDURE — 96366 THER/PROPH/DIAG IV INF ADDON: CPT

## 2020-10-10 PROCEDURE — 71260 CT THORAX DX C+: CPT

## 2020-10-10 PROCEDURE — 71045 X-RAY EXAM CHEST 1 VIEW: CPT

## 2020-10-10 PROCEDURE — 87635 SARS-COV-2 COVID-19 AMP PRB: CPT

## 2020-10-10 PROCEDURE — 65660000000 HC RM CCU STEPDOWN

## 2020-10-10 PROCEDURE — 87086 URINE CULTURE/COLONY COUNT: CPT

## 2020-10-10 PROCEDURE — 83605 ASSAY OF LACTIC ACID: CPT

## 2020-10-10 RX ORDER — SODIUM CHLORIDE 9 MG/ML
50 INJECTION, SOLUTION INTRAVENOUS CONTINUOUS
Status: DISCONTINUED | OUTPATIENT
Start: 2020-10-10 | End: 2020-10-13 | Stop reason: HOSPADM

## 2020-10-10 RX ORDER — MORPHINE SULFATE 2 MG/ML
2 INJECTION, SOLUTION INTRAMUSCULAR; INTRAVENOUS
Status: DISCONTINUED | OUTPATIENT
Start: 2020-10-10 | End: 2020-10-13 | Stop reason: HOSPADM

## 2020-10-10 RX ORDER — SODIUM CHLORIDE 0.9 % (FLUSH) 0.9 %
5-10 SYRINGE (ML) INJECTION AS NEEDED
Status: DISCONTINUED | OUTPATIENT
Start: 2020-10-10 | End: 2020-10-13 | Stop reason: HOSPADM

## 2020-10-10 RX ORDER — ENOXAPARIN SODIUM 100 MG/ML
40 INJECTION SUBCUTANEOUS EVERY 24 HOURS
Status: DISCONTINUED | OUTPATIENT
Start: 2020-10-10 | End: 2020-10-13 | Stop reason: HOSPADM

## 2020-10-10 RX ORDER — ONDANSETRON 2 MG/ML
4 INJECTION INTRAMUSCULAR; INTRAVENOUS
Status: DISCONTINUED | OUTPATIENT
Start: 2020-10-10 | End: 2020-10-13 | Stop reason: HOSPADM

## 2020-10-10 RX ADMIN — MORPHINE SULFATE 2 MG: 2 INJECTION, SOLUTION INTRAMUSCULAR; INTRAVENOUS at 22:10

## 2020-10-10 RX ADMIN — SODIUM CHLORIDE 125 ML/HR: 900 INJECTION, SOLUTION INTRAVENOUS at 18:06

## 2020-10-10 RX ADMIN — Medication 10 ML: at 22:11

## 2020-10-10 RX ADMIN — PIPERACILLIN AND TAZOBACTAM 3.38 G: 3; .375 INJECTION, POWDER, LYOPHILIZED, FOR SOLUTION INTRAVENOUS at 15:16

## 2020-10-10 RX ADMIN — IOPAMIDOL 100 ML: 612 INJECTION, SOLUTION INTRAVENOUS at 17:22

## 2020-10-10 RX ADMIN — SODIUM CHLORIDE 1000 ML: 900 INJECTION, SOLUTION INTRAVENOUS at 15:17

## 2020-10-10 RX ADMIN — MORPHINE SULFATE 2 MG: 2 INJECTION, SOLUTION INTRAMUSCULAR; INTRAVENOUS at 18:07

## 2020-10-10 RX ADMIN — ENOXAPARIN SODIUM 40 MG: 40 INJECTION SUBCUTANEOUS at 18:06

## 2020-10-10 NOTE — ED PROVIDER NOTES
EMERGENCY DEPARTMENT HISTORY AND PHYSICAL EXAM    Date: 10/10/2020  Patient Name: Erik Amezcua    History of Presenting Illness     Chief Complaint   Patient presents with    Abdominal Pain         History Provided By: Patient    2:04 PM  Erik Amezcua is a 62 y.o. male with PMHX of non-Hodgkin's lymphoma, hypertension, hyperlipidemia who presents to the emergency department C/O abdominal pain x2 days, fever and body aches, yesterday. The patient first today was sent to ER due to elevated white blood cell count. Patient has surgical history of splenectomy and states his white blood cell count is usually around 16,000. Other surgical history of colon resection and appendectomy. Pt denies nausea, vomiting, diarrhea, cough, chest pain, shortness of breath, history of sleep apnea, dysuria, known sick or COVID-19 contacts and any other sxs or complaints. PCP: Charlene Doss MD    Current Facility-Administered Medications   Medication Dose Route Frequency Provider Last Rate Last Dose    sodium chloride (NS) flush 5-10 mL  5-10 mL IntraVENous PRN [facility]        piperacillin-tazobactam (ZOSYN) 3.375 g in 0.9% sodium chloride (MBP/ADV) 100 mL MBP  3.375 g IntraVENous Q6H [facility], [facility] [address]        morphine injection 2 mg  2 mg IntraVENous Q4H PRN Bre Bates, DO        [facility] injection 4 mg  4 mg IntraVENous Q4H PRN Bre Bates, DO        0.9% sodium chloride infusion  125 mL/hr IntraVENous CONTINUOUS Bre Bates, DO        enoxaparin (LOVENOX) injection 40 mg  40 mg SubCUTAneous Q24H Bre Bates, DO         Current Outpatient Medications   Medication Sig Dispense Refill    oxyCODONE-acetaminophen (PERCOCET) 5-325 mg per tablet Take 1 Tab by mouth every four (4) hours as needed. Max Daily Amount: 6 Tabs. 10 Tab 0    aspirin delayed-release 81 mg tablet Take  by mouth daily.       losartan-hydroCHLOROthiazide (HYZAAR) 100-12.5 mg per tablet Take 1 Tab by mouth daily. Indications: hypertension      metoprolol succinate (TOPROL-XL) 25 mg XL tablet Take 25 mg by mouth nightly. Indications: hypertension      rosuvastatin (CRESTOR) 40 mg tablet Take 40 mg by mouth daily. Indications: hypercholesterolemia      MULTIVITAMINS WITH FLUORIDE (MULTI-VITAMIN PO) Take  by mouth daily.  METHYLCELLULOSE (FIBER LAXATIVE PO) Take 5 Caps by mouth two (2) times a day.  esomeprazole (NEXIUM) 20 mg capsule Take 40 mg by mouth daily. Indications: gastroesophageal reflux disease         Past History     Past Medical History:  Past Medical History:   Diagnosis Date    Acid reflux     Arthritis     Cancer ([facility] [address]     non Hodgkin's     GERD (gastroesophageal reflux disease)     Hypercholesteremia     Hypertension 2018    BP spiked when administered anesthesia for scheduled surgery. on 1-    Ill-defined condition     menieres    Meniere disease, left     Non-Hodgkin lymphoma ([facility] [address]        Past Surgical History:  Past Surgical History:   Procedure Laterality Date    ABDOMEN SURGERY PROC UNLISTED      spleenectomy, laporotomy    HX APPENDECTOMY      HX HERNIA REPAIR Bilateral     HX ORTHOPAEDIC Left     wrist pinning    HX ROTATOR CUFF REPAIR Bilateral     HX TONSILLECTOMY         Family History:  History reviewed. No pertinent family history. Social History:  Social History     Tobacco Use    Smoking status: Former Smoker     Last attempt to quit: 1995     Years since quittin.6    Smokeless tobacco: Never Used   Substance Use Topics    Alcohol use: Yes     Alcohol/week: 7.0 standard drinks     Types: 7 Cans of beer per week    Drug use: No       Allergies:  No Known Allergies      Review of Systems   Review of Systems   Constitutional: Positive for fever. Respiratory: Negative for cough and shortness of breath. Cardiovascular: Negative for chest pain. Gastrointestinal: Positive for abdominal pain.  Negative for diarrhea, nausea and vomiting. Genitourinary: Negative for dysuria. Musculoskeletal: Positive for myalgias. All other systems reviewed and are negative. Physical Exam     Vitals:    10/10/20 1516 10/10/20 1520 10/10/20 1530 10/10/20 1615   BP: (!) 120/91  135/84 (!) 140/87   Pulse: 98      Resp:       Temp:       SpO2: 93% 94% 92%    Weight:       Height:         Physical Exam    Vital signs and nursing notes reviewed. CONSTITUTIONAL: Alert. Well-appearing; well-nourished; in no apparent distress. HEAD: Normocephalic; atraumatic. EYES:  Conjunctiva clear. CV: Normal S1, S2; no murmurs, rubs, or gallops. No chest wall tenderness. RESPIRATORY: Normal chest excursion with respiration; breath sounds clear and equal bilaterally; no wheezes, rhonchi, or rales. GI: Normal bowel sounds; non-distended; well-healed midline surgical scar. Generalized mild tenderness. No guarding or rigidity; no palpable organomegaly. No CVA tenderness. BACK:  No evidence of trauma or deformity. Non-tender to palpation. FROM without difficulty. EXT: Normal ROM in all four extremities; non-tender to palpation. No edema or calf tenderness  SKIN: Normal for age and race; warm; dry; good turgor; no apparent lesions or exudate. NEURO: A & O x3. PSYCH:  Mood and affect appropriate.          Diagnostic Study Results     Labs -     Recent Results (from the past 12 hour(s))   EKG, 12 LEAD, INITIAL    Collection Time: 10/10/20  2:21 PM   Result Value Ref Range    Ventricular Rate 98 BPM    Atrial Rate 98 BPM    P-R Interval 148 ms    QRS Duration 98 ms    Q-T Interval 342 ms    QTC Calculation (Bezet) 436 ms    Calculated P Axis 50 degrees    Calculated R Axis -78 degrees    Calculated T Axis 32 degrees    Diagnosis       Normal sinus rhythm  Left axis deviation  Incomplete right bundle branch block  Abnormal ECG  When compared with ECG of 10-APRIL-2018 16:05,  Incomplete right bundle branch block is now present     CBC WITH AUTOMATED DIFF Collection Time: 10/10/20  2:35 PM   Result Value Ref Range    WBC 20.3 (H) 4.6 - 13.2 K/uL    RBC 5.05 4.70 - 5.50 M/uL    HGB 16.7 (H) 13.0 - 16.0 g/dL    HCT 49.6 (H) 36.0 - 48.0 %    MCV 98.2 (H) 74.0 - 97.0 FL    MCH 33.1 24.0 - 34.0 PG    MCHC 33.7 31.0 - 37.0 g/dL    RDW 13.8 11.6 - 14.5 %    PLATELET 182 030 - 157 K/uL    MPV 9.4 9.2 - 11.8 FL    NEUTROPHILS 68 42 - 75 %    BAND NEUTROPHILS 1 0 - 5 %    LYMPHOCYTES 23 20 - 51 %    MONOCYTES 7 2 - 9 %    EOSINOPHILS 1 0 - 5 %    BASOPHILS 0 0 - 3 %    ABS. NEUTROPHILS 13.8 (H) 1.8 - 8.0 K/UL    ABS. LYMPHOCYTES 4.7 (H) 0.8 - 3.5 K/UL    ABS. MONOCYTES 1.4 (H) 0 - 1.0 K/UL    ABS. EOSINOPHILS 0.2 0.0 - 0.4 K/UL    ABS. BASOPHILS 0.0 0.0 - 0.1 K/UL    PLATELET COMMENTS ADEQUATE PLATELETS      RBC COMMENTS NORMOCYTIC, NORMOCHROMIC      DF MANUAL     METABOLIC PANEL, COMPREHENSIVE    Collection Time: 10/10/20  2:35 PM   Result Value Ref Range    Sodium 136 136 - 145 mmol/L    Potassium 4.0 3.5 - 5.5 mmol/L    Chloride 97 (L) 100 - 111 mmol/L    CO2 30 21 - 32 mmol/L    Anion gap 9 3.0 - 18 mmol/L    Glucose 98 74 - 99 mg/dL    BUN 15 7.0 - 18 MG/DL    Creatinine 0.87 0.6 - 1.3 MG/DL    BUN/Creatinine ratio 17 12 - 20      GFR est AA >60 >60 ml/min/1.73m2    GFR est non-AA >60 >60 ml/min/1.73m2    Calcium 9.6 8.5 - 10.1 MG/DL    Bilirubin, total 0.7 0.2 - 1.0 MG/DL    ALT (SGPT) 41 16 - 61 U/L    AST (SGOT) 23 10 - 38 U/L    Alk.  phosphatase 64 45 - 117 U/L    Protein, total 7.5 6.4 - 8.2 g/dL    Albumin 3.6 3.4 - 5.0 g/dL    Globulin 3.9 2.0 - 4.0 g/dL    A-G Ratio 0.9 0.8 - 1.7     URINALYSIS W/ RFLX MICROSCOPIC    Collection Time: 10/10/20  2:35 PM   Result Value Ref Range    Color DARK YELLOW      Appearance CLEAR      Specific gravity 1.030 1.005 - 1.030      pH (UA) 5.5 5.0 - 8.0      Protein 300 (A) NEG mg/dL    Glucose Negative NEG mg/dL    Ketone Negative NEG mg/dL    Bilirubin SMALL (A) NEG      Blood SMALL (A) NEG      Urobilinogen 1.0 0.2 - 1.0 EU/dL    Nitrites Negative NEG      Leukocyte Esterase TRACE (A) NEG     LIPASE    Collection Time: 10/10/20  2:35 PM   Result Value Ref Range    Lipase 781 (H) 73 - 393 U/L   LACTIC ACID    Collection Time: 10/10/20  2:35 PM   Result Value Ref Range    Lactic acid 0.8 0.4 - 2.0 MMOL/L   URINE MICROSCOPIC ONLY    Collection Time: 10/10/20  2:35 PM   Result Value Ref Range    WBC 0 to 3 0 - 5 /hpf    RBC 0 to 3 0 - 5 /hpf    Epithelial cells FEW 0 - 5 /lpf    Bacteria NONE SEEN NEG /hpf    Mucus FEW (A) NEG /lpf    Amorphous Crystals FEW (A) NEG     SARS-COV-2    Collection Time: 10/10/20  4:45 PM   Result Value Ref Range    SARS-CoV-2 PENDING     Specimen source Nasopharyngeal      Specimen type NP Swab         Radiologic Studies -     Chest x-ray shows ? infiltrate in the left lower lobe  Pending review by radiologist    CT CHEST ABD PELV W CONT   Final Result   IMPRESSION:      1. Peripancreatic fat stranding suspicious for acute interstitial edematous   pancreatitis. No glandular necrosis or fluid collection. 2. Hepatic steatosis. 3. No evidence of acute infection. XR CHEST PORT   Final Result   IMPRESSION:      No acute pulmonary findings      Left basilar scarring and/or atelectasis        CT Results  (Last 48 hours)               10/10/20 1721  CT CHEST ABD PELV W CONT Final result    Impression:  IMPRESSION:       1. Peripancreatic fat stranding suspicious for acute interstitial edematous   pancreatitis. No glandular necrosis or fluid collection. 2. Hepatic steatosis. 3. No evidence of acute infection. Narrative:  EXAM: CT CHEST, ABDOMEN AND PELVIS        CLINICAL INDICATION/HISTORY: Fever. Abdominal pain. COMPARISON: 5/20/2018       TECHNIQUE: Axial CT imaging of the chest, abdomen, and pelvis was performed with   intravenous contrast. Multiplanar reformats were generated.  One or more dose   reduction techniques were used on this CT: automated exposure control, adjustment of the mAs and/or kVp according to patient size, and iterative   reconstruction techniques. The specific techniques used on this CT exam have   been documented in the patient's electronic medical record. Digital Imaging and   Communications in Medicine (DICOM) format image data are available to   nonaffiliated external healthcare facilities or entities on a secure, media   free, reciprocally searchable basis with patient authorization for at least a   12-month period after this study. ________________       FINDINGS:       CHEST:       LUNGS: Foci mild subsegmental atelectasis. No suspicious opacities. PLEURA: Normal.       AIRWAY: Normal.       MEDIASTINUM: Normal heart size. No pericardial effusion. Mild coronary and   aortic atherosclerotic calcifications. LYMPH NODES: No enlarged lymph nodes. OTHER: None.       _______________       ABDOMEN/PELVIS:       LIVER, BILIARY: Mild hepatic steatosis. Liver has a somewhat lobulated contour. No suspicious mass lesion. No biliary dilation. Gallbladder is unremarkable. SPLEEN: Surgically absent. PANCREAS: There is some fat stranding around the pancreas most notable at the   head. Region of relative hyperdensity in the head of the pancreas is likely   accentuated by surrounding fatty infiltration and appears similar to prior study   (and similar dating back to 4/27/2014). No ductal dilation. ADRENALS: Normal.       KIDNEYS/URETERS/BLADDER: Simple cyst lower pole left kidney, otherwise   unremarkable. LYMPH NODES: No enlarged lymph nodes. GASTROINTESTINAL TRACT: Evidence of prior sigmoid anastomosis. No evidence of   obstruction or acute inflammation. VASCULATURE: Mild atherosclerotic calcifications. PELVIC ORGANS: Unremarkable. BONES: No acute or aggressive osseous abnormalities identified.        OTHER: None.       _______________               CXR Results  (Last 48 hours) 10/10/20 1452  XR CHEST PORT Final result    Impression:  IMPRESSION:       No acute pulmonary findings       Left basilar scarring and/or atelectasis       Narrative:  EXAM: CHEST RADIOGRAPH       CLINICAL INDICATION/HISTORY: Sepsis   -Additional: None       COMPARISON: May 28, 2018       TECHNIQUE: Portable frontal view of the chest       _______________       FINDINGS:       SUPPORT DEVICES: None. HEART AND MEDIASTINUM: No appreciable cardiomegaly. Remaining mediastinal   contours within normal limits. LUNGS AND PLEURAL SPACES: There is linear opacity at the left lung base. No   consolidation, mass, or pleural effusion. BONY THORAX AND SOFT TISSUES: Unremarkable.       _______________                 Medications given in the ED-  Medications   sodium chloride (NS) flush 5-10 mL (has no administration in time range)   piperacillin-tazobactam (ZOSYN) 3.375 g in 0.9% sodium chloride (MBP/ADV) 100 mL MBP (has no administration in time range)   morphine injection 2 mg (has no administration in time range)   ondansetron (ZOFRAN) injection 4 mg (has no administration in time range)   0.9% sodium chloride infusion (has no administration in time range)   enoxaparin (LOVENOX) injection 40 mg (has no administration in time range)   sodium chloride 0.9 % bolus infusion 1,000 mL (0 mL IntraVENous IV Completed 10/10/20 1715)     Followed by   sodium chloride 0.9 % bolus infusion 1,000 mL (0 mL IntraVENous Stopped 10/10/20 1521)   iopamidoL (ISOVUE 300) 61 % contrast injection 100 mL (100 mL IntraVENous Given 10/10/20 1722)         Medical Decision Making   I am the first provider for this patient. I reviewed the vital signs, available nursing notes, past medical history, past surgical history, family history and social history. Vital Signs-Reviewed the patient's vital signs.     Pulse Oximetry Analysis - 94% on RA       Records Reviewed: Nursing Notes      Procedures:  Procedures    ED Course:  2:04 PM Initial assessment performed. The patients presenting problems have been discussed, and they are in agreement with the care plan formulated and outlined with them. I have encouraged them to ask questions as they arise throughout their visit. 4:15 PM progress note  Patient's O2 sats noted to be 94 to 95% on room air. He did fall asleep and they dropped to 92%. He denies any complaints of shortness of breath or cough. Given that chest x-ray shows an infiltrate in the left lower lobe, will change CT abdomen to include CT of the chest to further evaluate this infiltrate placed on droplet plus precautions and test for COVID.    4:20 PM consult note  Case discussed with ED attending Dr. Rima Brown who agrees with above plan and work-up thus far.    5:55 PM consult note  Case discussed with hospitalist Dr. Stacy Otero who will admit to telemetry floor. Diagnosis and Disposition     ADMISSION NOTE:  5:58 PM  Patient is being admitted to the hospital by Dr. Stacy Otero. The results of their tests and reasons for their admission have been discussed with them and/or available family. They convey agreement and understanding for the need to be admitted and for their admission diagnosis. CONDITIONS ON ADMISSION:  Deep Vein Thrombosis is not present at the time of admission. Thrombosis is not present at the time of admission. Urinary Tract Infection is not present at the time of admission. Pneumonia is not present at the time of admission. MRSA is not present at the time of admission. Wound infection is not present at the time of admission. Pressure Ulcer is not present at the time of admission. CLINICAL IMPRESSION:    1. Acute pancreatitis without infection or necrosis, unspecified pancreatitis type    2. Suspected COVID-19 virus infection        PLAN:  1. Admit        Please note that this dictation was completed with [employer], the [employer] voice recognition software.   Quite often unanticipated grammatical, syntax, homophones, and other interpretive errors are inadvertently transcribed by the computer software. Please disregard these errors. Please excuse any errors that have escaped final proofreading.

## 2020-10-10 NOTE — ROUTINE PROCESS
Bedside and Verbal shift change report given to GUANAKO Barreto RN (oncoming nurse) by SNOW Ram RN (offgoing nurse). Report included the following information SBAR, Kardex, Intake/Output and MAR.

## 2020-10-10 NOTE — ED TRIAGE NOTES
Patient with complaints of abdominal pain that started on Thursday.  Patient reports body aches and a fever that started last night

## 2020-10-10 NOTE — ROUTINE PROCESS
Bedside and Verbal shift change report given to GUANAKO Rouse R.N. (oncoming nurse) by SNOW Beck R.N. (offgoing nurse). Report included the following information SBAR, Kardex, Intake/Output, MAR, Accordion, Recent Results and Med Rec Status.

## 2020-10-10 NOTE — PROGRESS NOTES
1900: Patient care received. Patient alert and oriented x 4. Patient resting in bed denies pain. Patient stable. Call light with in reach bed in lowest position.

## 2020-10-10 NOTE — ED NOTES
Discussed 93-95% o2 saturation w/ PA Almira Lesch, will continue to monitor.  Will discontinue weight based fluids, will continue 1L to completion 1L of NS

## 2020-10-10 NOTE — H&P
History & Physical    Patient: Remington Antoine MRN: [id number]  CSN: [id number]    YOB: 1960  Age: 62 y.o. Sex: male      DOA: 10/10/2020  Primary Care Provider:  Micah Ulloa MD      Assessment/Plan     1. Acute pancreatitis due to alcohol  2. nonhogdkins lymphoma  3. Abdominal pain  4. Leukocytosis due to above, doubt active infection  5. GERD    PLAN:  - admit to medicine  - IV fluids  - analgesia  - antiemetics as needed  - npo  - COVID rule out  - full code    Patient Active Problem List   Diagnosis Code    NHL (non-Hodgkin's lymphoma) ([facility] [address] C85.90    Acute pancreatitis K85.90    S/P splenectomy Z90.81    Other and unspecified hyperlipidemia E78.5    Cervical spinal stenosis M48.02    Cervical spondylosis M47.812    Radiculopathy affecting upper extremity M54.10    Hypercholesteremia E78.00    GERD (gastroesophageal reflux disease) K21.9    HTN (hypertension) I10    Probable sepsis R68.89     HPI:   CC: abdominal pain  Remington Antoine is a 62 y.o. male with past medical history significant for nonhodgkins lymphoma, GERD, HTN presents to the ER with abdominal pain which started about 2-3 days agoo. HE notes pain is epigastric in nature w radiation to the back, associated w mild nausea, low grade fever and myalgia. No vomiting, melena or diarrhea noted. No cough or dyspnea. He states he has been drinking more alcohol than usual.    On presentation to the ER he was afebrile, tachycardic, hypertensive with out hypoxia. Exam w epigastric tenderness. CT abdomen w pancreatitis and atelectasis. Labs w elevated lipase, elevated WBC. Medicine is asked to admit for further management. Past Medical History:   Diagnosis Date    Acid reflux     Arthritis     Cancer ([facility] [address] 1983    non Hodgkin's     GERD (gastroesophageal reflux disease)     Hypercholesteremia     Hypertension 01/29/2018    BP spiked when administered anesthesia for scheduled surgery. on 1-    Ill-defined condition     menieres    Meniere disease, left     Non-Hodgkin lymphoma ([facility] [address]      Past Surgical History:   Procedure Laterality Date    ABDOMEN SURGERY PROC UNLISTED      spleenectomy, laporotomy    HX APPENDECTOMY      HX HERNIA REPAIR Bilateral     HX ORTHOPAEDIC Left     wrist pinning    HX ROTATOR CUFF REPAIR Bilateral     HX TONSILLECTOMY        Social History     Tobacco Use    Smoking status: Former Smoker     Last attempt to quit: 1995     Years since quittin.6    Smokeless tobacco: Never Used   Substance Use Topics    Alcohol use: Yes     Alcohol/week: 7.0 standard drinks     Types: 7 Cans of beer per week    Drug use: No     History reviewed. No pertinent family history. No current facility-administered medications on file prior to encounter. Current Outpatient Medications on File Prior to Encounter   Medication Sig Dispense Refill    oxyCODONE-acetaminophen (PERCOCET) 5-325 mg per tablet Take 1 Tab by mouth every four (4) hours as needed. Max Daily Amount: 6 Tabs. 10 Tab 0    aspirin delayed-release 81 mg tablet Take  by mouth daily.  losartan-hydroCHLOROthiazide (HYZAAR) 100-12.5 mg per tablet Take 1 Tab by mouth daily. Indications: hypertension      metoprolol succinate (TOPROL-XL) 25 mg XL tablet Take 25 mg by mouth nightly. Indications: hypertension      rosuvastatin (CRESTOR) 40 mg tablet Take 40 mg by mouth daily. Indications: hypercholesterolemia      MULTIVITAMINS WITH FLUORIDE (MULTI-VITAMIN PO) Take  by mouth daily.  METHYLCELLULOSE (FIBER LAXATIVE PO) Take 5 Caps by mouth two (2) times a day.  esomeprazole (NEXIUM) 20 mg capsule Take 40 mg by mouth daily.  Indications: gastroesophageal reflux disease        No Known Allergies        Review of Systems  Constitutional: + fever, - chills, diaphoresis, malaise, fatigue or weight gain/loss or falls  Skin: no itching or rashes  HEENT: no neck stiffness, hearing loss, tinnitus, epistaxis or sore throat  EYES: no blurry vision, double vision or photophobia  CARDIOVASCULAR: no cp, palpitations, orthopnea, pnd or LE edema  PULMONARY: no cough, wheeze,+ shortness of breath - sputum production  GI: no nausea, vomiting, diarrhea,+ abdominal pain, - melena, hematemesis or brbpr  : no dysuria, hematuria  MUSCULOSKELETAL: no back pain, joint pain or myalgias  ENDOCRINE: no heat/cold intolerance, polyuria or polydipsia  HEME: no easy bruising or bleeding  NEURO: no unilateral weakness, numbness, tingling or seizures      Physical Exam:        Visit Vitals  BP (!) 148/89 (BP 1 Location: Left arm, BP Patient Position: At rest;Supine)   Pulse 86   Temp 98 °F (36.7 °C)   Resp 21   Ht 5' 8\" (1.727 m)   Wt 90.3 kg (199 lb)   SpO2 98%   BMI 30.26 kg/m²      O2 Device: Room air    Temp (24hrs), Av.2 °F (36.8 °C), Min:98 °F (36.7 °C), Max:98.4 °F (36.9 °C)    10/10 07 - 10/10 1900  In: 1110 [I.V.:1110]  Out: -    No intake/output data recorded. Body mass index is 30.26 kg/m². General:  Awake, cooperative, no distress. Head:  Normocephalic, without obvious abnormality, atraumatic. Eyes:  Conjunctivae/corneas clear, sclera anicteric, PERRL, EOMs intact. Nose: Nares normal. No drainage or sinus tenderness. Throat: Lips, mucosa, and tongue normal. .   Neck: Supple, symmetrical, trachea midline, no adenopathy. Lungs:   Clear to auscultation bilaterally, equal expansion       Heart:  Regular rate and rhythm, S1, S2 normal, no murmur, click, rub or gallop, cap refill normal      Abdomen: Soft, non-tender. Bowel sounds normal. No masses,  No organomegaly. Extremities: Extremities normal, atraumatic, no cyanosis or edema. Pulses: 2+ and symmetric all extremities. Skin: Skin color pale, texture, turgor normal. No rashes or lesions   Neurologic: CNII-XII intact. No focal motor or sensory deficit.            Laboratory Studies:    CMP:   Lab Results   Component Value Date/Time     10/10/2020 02:35 PM    K 4.0 10/10/2020 02:35 PM    CL 97 (L) 10/10/2020 02:35 PM    CO2 30 10/10/2020 02:35 PM    AGAP 9 10/10/2020 02:35 PM    GLU 98 10/10/2020 02:35 PM    BUN 15 10/10/2020 02:35 PM    CREA 0.87 10/10/2020 02:35 PM    GFRAA >60 10/10/2020 02:35 PM    GFRNA >60 10/10/2020 02:35 PM    CA 9.6 10/10/2020 02:35 PM    ALB 3.6 10/10/2020 02:35 PM    TP 7.5 10/10/2020 02:35 PM    GLOB 3.9 10/10/2020 02:35 PM    AGRAT 0.9 10/10/2020 02:35 PM    ALT 41 10/10/2020 02:35 PM     CBC:   Lab Results   Component Value Date/Time    WBC 20.3 (H) 10/10/2020 02:35 PM    HGB 16.7 (H) 10/10/2020 02:35 PM    HCT 49.6 (H) 10/10/2020 02:35 PM     10/10/2020 02:35 PM     Pancreatic Markers:   Lab Results   Component Value Date/Time    LPSE 781 (H) 10/10/2020 02:35 PM       Imaging studies personally reviewed:    CT Chest:\" 1. Peripancreatic fat stranding suspicious for acute interstitial edematous  pancreatitis. No glandular necrosis or fluid collection.     2. Hepatic steatosis.     3. No evidence of acute infection. \"    CXR:\"    No acute pulmonary findings     Left basilar scarring and/or atelectasis\"      Terisa Show, DO  Internal Medicine/Geriatrics

## 2020-10-10 NOTE — ROUTINE PROCESS
TRANSFER - IN REPORT: 
 
Verbal report received from BRENDA Roberts R.N.(name) on Crystal Cavanaugh  being received from ED(unit) for routine progression of care Report consisted of patients Situation, Background, Assessment and  
Recommendations(SBAR). Information from the following report(s) SBAR, Kardex, Intake/Output, MAR, Accordion, Recent Results and Med Rec Status was reviewed with the receiving nurse. Opportunity for questions and clarification was provided. Assessment completed upon patients arrival to unit and care assumed.

## 2020-10-10 NOTE — ROUTINE PROCESS
TRANSFER - OUT REPORT: 
 
Verbal report given to ROGELIO Matos(name) on Nano Samples  being transferred to (unit) for routine progression of care Report consisted of patients Situation, Background, Assessment and  
Recommendations(SBAR). Information from the following report(s) SBAR, ED Summary, MAR, Recent Results and Cardiac Rhythm NSR was reviewed with the receiving nurse. Lines:  
Peripheral IV 10/10/20 Right Antecubital (Active) Site Assessment Clean, dry, & intact 10/10/20 1435 Phlebitis Assessment 0 10/10/20 1435 Infiltration Assessment 0 10/10/20 1435 Dressing Status Clean, dry, & intact 10/10/20 1435 Dressing Type Transparent 10/10/20 1435 Hub Color/Line Status Green;Patent; Flushed 10/10/20 1435 Action Taken Blood drawn 10/10/20 1435 Opportunity for questions and clarification was provided. Patient transported with: 
 Monitor Registered Nurse

## 2020-10-11 ENCOUNTER — APPOINTMENT (OUTPATIENT)
Dept: ULTRASOUND IMAGING | Age: 60
DRG: 439 | End: 2020-10-11
Attending: INTERNAL MEDICINE
Payer: COMMERCIAL

## 2020-10-11 LAB
ALBUMIN SERPL-MCNC: 3.2 G/DL (ref 3.4–5)
ALBUMIN/GLOB SERPL: 0.9 {RATIO} (ref 0.8–1.7)
ALP SERPL-CCNC: 63 U/L (ref 45–117)
ALT SERPL-CCNC: 34 U/L (ref 16–61)
ANION GAP SERPL CALC-SCNC: 7 MMOL/L (ref 3–18)
AST SERPL-CCNC: 24 U/L (ref 10–38)
BASOPHILS # BLD: 0 K/UL (ref 0–0.1)
BASOPHILS NFR BLD: 0 % (ref 0–2)
BILIRUB SERPL-MCNC: 0.6 MG/DL (ref 0.2–1)
BUN SERPL-MCNC: 19 MG/DL (ref 7–18)
BUN/CREAT SERPL: 26 (ref 12–20)
CALCIUM SERPL-MCNC: 8.7 MG/DL (ref 8.5–10.1)
CHLORIDE SERPL-SCNC: 102 MMOL/L (ref 100–111)
CO2 SERPL-SCNC: 28 MMOL/L (ref 21–32)
CREAT SERPL-MCNC: 0.72 MG/DL (ref 0.6–1.3)
DIFFERENTIAL METHOD BLD: ABNORMAL
EOSINOPHIL # BLD: 0.4 K/UL (ref 0–0.4)
EOSINOPHIL NFR BLD: 2 % (ref 0–5)
ERYTHROCYTE [DISTWIDTH] IN BLOOD BY AUTOMATED COUNT: 13.7 % (ref 11.6–14.5)
GLOBULIN SER CALC-MCNC: 3.5 G/DL (ref 2–4)
GLUCOSE SERPL-MCNC: 83 MG/DL (ref 74–99)
HCT VFR BLD AUTO: 48.7 % (ref 36–48)
HGB BLD-MCNC: 15.9 G/DL (ref 13–16)
LIPASE SERPL-CCNC: 2438 U/L (ref 73–393)
LYMPHOCYTES # BLD: 3.4 K/UL (ref 0.9–3.6)
LYMPHOCYTES NFR BLD: 16 % (ref 21–52)
MCH RBC QN AUTO: 32.4 PG (ref 24–34)
MCHC RBC AUTO-ENTMCNC: 32.6 G/DL (ref 31–37)
MCV RBC AUTO: 99.4 FL (ref 74–97)
MONOCYTES # BLD: 2.5 K/UL (ref 0.05–1.2)
MONOCYTES NFR BLD: 12 % (ref 3–10)
NEUTS SEG # BLD: 14.5 K/UL (ref 1.8–8)
NEUTS SEG NFR BLD: 70 % (ref 40–73)
PLATELET # BLD AUTO: 424 K/UL (ref 135–420)
PMV BLD AUTO: 9.8 FL (ref 9.2–11.8)
POTASSIUM SERPL-SCNC: 4.4 MMOL/L (ref 3.5–5.5)
PROT SERPL-MCNC: 6.7 G/DL (ref 6.4–8.2)
RBC # BLD AUTO: 4.9 M/UL (ref 4.7–5.5)
SODIUM SERPL-SCNC: 137 MMOL/L (ref 136–145)
TRIGL SERPL-MCNC: 96 MG/DL (ref ?–150)
WBC # BLD AUTO: 20.9 K/UL (ref 4.6–13.2)

## 2020-10-11 PROCEDURE — C9113 INJ PANTOPRAZOLE SODIUM, VIA: HCPCS | Performed by: INTERNAL MEDICINE

## 2020-10-11 PROCEDURE — 85025 COMPLETE CBC W/AUTO DIFF WBC: CPT

## 2020-10-11 PROCEDURE — 76705 ECHO EXAM OF ABDOMEN: CPT

## 2020-10-11 PROCEDURE — 74011000250 HC RX REV CODE- 250: Performed by: INTERNAL MEDICINE

## 2020-10-11 PROCEDURE — 80053 COMPREHEN METABOLIC PANEL: CPT

## 2020-10-11 PROCEDURE — 74011250636 HC RX REV CODE- 250/636: Performed by: INTERNAL MEDICINE

## 2020-10-11 PROCEDURE — 77030027138 HC INCENT SPIROMETER -A

## 2020-10-11 PROCEDURE — 65660000000 HC RM CCU STEPDOWN

## 2020-10-11 PROCEDURE — 83690 ASSAY OF LIPASE: CPT

## 2020-10-11 RX ORDER — LORAZEPAM 2 MG/ML
1 INJECTION INTRAMUSCULAR
Status: DISCONTINUED | OUTPATIENT
Start: 2020-10-11 | End: 2020-10-13 | Stop reason: HOSPADM

## 2020-10-11 RX ORDER — LORAZEPAM 2 MG/ML
3 INJECTION INTRAMUSCULAR
Status: DISCONTINUED | OUTPATIENT
Start: 2020-10-11 | End: 2020-10-13 | Stop reason: HOSPADM

## 2020-10-11 RX ORDER — LABETALOL HCL 20 MG/4 ML
10 SYRINGE (ML) INTRAVENOUS
Status: DISCONTINUED | OUTPATIENT
Start: 2020-10-11 | End: 2020-10-13 | Stop reason: HOSPADM

## 2020-10-11 RX ORDER — SODIUM CHLORIDE 0.9 % (FLUSH) 0.9 %
5-40 SYRINGE (ML) INJECTION EVERY 8 HOURS
Status: DISCONTINUED | OUTPATIENT
Start: 2020-10-11 | End: 2020-10-13 | Stop reason: HOSPADM

## 2020-10-11 RX ORDER — LORAZEPAM 2 MG/ML
2 INJECTION INTRAMUSCULAR
Status: DISCONTINUED | OUTPATIENT
Start: 2020-10-11 | End: 2020-10-13 | Stop reason: HOSPADM

## 2020-10-11 RX ORDER — LORAZEPAM 1 MG/1
1 TABLET ORAL
Status: DISCONTINUED | OUTPATIENT
Start: 2020-10-11 | End: 2020-10-13 | Stop reason: HOSPADM

## 2020-10-11 RX ORDER — LORAZEPAM 1 MG/1
2 TABLET ORAL
Status: DISCONTINUED | OUTPATIENT
Start: 2020-10-11 | End: 2020-10-13 | Stop reason: HOSPADM

## 2020-10-11 RX ORDER — MELOXICAM 7.5 MG/1
7.5 TABLET ORAL DAILY
Status: ON HOLD | COMMUNITY
End: 2021-09-27

## 2020-10-11 RX ORDER — SODIUM CHLORIDE 0.9 % (FLUSH) 0.9 %
5-40 SYRINGE (ML) INJECTION AS NEEDED
Status: DISCONTINUED | OUTPATIENT
Start: 2020-10-11 | End: 2020-10-13 | Stop reason: HOSPADM

## 2020-10-11 RX ADMIN — WATER 1 G: 1 INJECTION INTRAMUSCULAR; INTRAVENOUS; SUBCUTANEOUS at 07:00

## 2020-10-11 RX ADMIN — ENOXAPARIN SODIUM 40 MG: 40 INJECTION SUBCUTANEOUS at 18:47

## 2020-10-11 RX ADMIN — SODIUM CHLORIDE 125 ML/HR: 900 INJECTION, SOLUTION INTRAVENOUS at 06:57

## 2020-10-11 RX ADMIN — MORPHINE SULFATE 2 MG: 2 INJECTION, SOLUTION INTRAMUSCULAR; INTRAVENOUS at 02:54

## 2020-10-11 RX ADMIN — MORPHINE SULFATE 2 MG: 2 INJECTION, SOLUTION INTRAMUSCULAR; INTRAVENOUS at 20:58

## 2020-10-11 RX ADMIN — MORPHINE SULFATE 2 MG: 2 INJECTION, SOLUTION INTRAMUSCULAR; INTRAVENOUS at 07:15

## 2020-10-11 RX ADMIN — MORPHINE SULFATE 2 MG: 2 INJECTION, SOLUTION INTRAMUSCULAR; INTRAVENOUS at 16:08

## 2020-10-11 RX ADMIN — MORPHINE SULFATE 2 MG: 2 INJECTION, SOLUTION INTRAMUSCULAR; INTRAVENOUS at 11:33

## 2020-10-11 RX ADMIN — SODIUM CHLORIDE 40 MG: 9 INJECTION, SOLUTION INTRAMUSCULAR; INTRAVENOUS; SUBCUTANEOUS at 09:27

## 2020-10-11 RX ADMIN — FOLIC ACID: 5 INJECTION, SOLUTION INTRAMUSCULAR; INTRAVENOUS; SUBCUTANEOUS at 09:16

## 2020-10-11 RX ADMIN — Medication 10 ML: at 07:30

## 2020-10-11 NOTE — PROGRESS NOTES
Problem: Falls - Risk of  Goal: *Absence of Falls  Description: Document Ivin Yates Fall Risk and appropriate interventions in the flowsheet.   Outcome: Progressing Towards Goal  Note: Fall Risk Interventions:                                Problem: Patient Education: Go to Patient Education Activity  Goal: Patient/Family Education  Outcome: Progressing Towards Goal     Problem: Pain  Goal: *Control of Pain  Outcome: Progressing Towards Goal

## 2020-10-11 NOTE — ROUTINE PROCESS
Bedside and Verbal shift change report given to Gino Rouse RN (oncoming nurse) by MORRIS Mcneil RN (offgoing nurse). Report included the following information SBAR, Kardex, OR Summary, Intake/Output, MAR and Recent Results.

## 2020-10-11 NOTE — PROGRESS NOTES
Reason for Admission:   Chart reviewed as care manager on call; patient is a 62 yr old male with past medical h of nonhodgkins lymphoma, GERD, HTN presented to the ED w/ abdominal pain which started about 2-3 days prior. In ED he was afebrile, tachycardic, HTN w/o hypoxia; epigastric tenderness; CT abdomen w/ pancreatitis and atelectasis. Admitted for medical management. RUR Score:       Low, 12%              Plan for utilizing home health:      none    PCP: First and Last name:  Chivo Solorio   Name of Practice: [facility]   Are you a current patient: Yes/No: yes   Approximate date of last visit: 1 month ago   Can you participate in a virtual visit with your PCP:  Will see Dr. Mauri Lake for in person visits                    Current Advanced Directive/Advance Care Plan: Full code, no advanced care planning on chart                         Transition of Care Plan:   Introduced care manager role to patient via telephone; patient lives alone and does not utilize any DME; will drive self home on discharge; no immediate care management needs but care management available to assist as needed.                  Care Management Interventions  PCP Verified by CM: Yes(1 month ago)  Mode of Transport at Discharge: Self  Transition of Care Consult (CM Consult): Discharge Planning  Current Support Network: Lives Alone  Confirm Follow Up Transport: Self  The Plan for Transition of Care is Related to the Following Treatment Goals : home when medically stable  The Patient and/or Patient Representative was Provided with a Choice of Provider and Agrees with the Discharge Plan?: Yes  Name of the Patient Representative Who was Provided with a Choice of Provider and Agrees with the Discharge Plan: Octavio Conner (patient)  Discharge Location  Discharge Placement: Home

## 2020-10-11 NOTE — PROGRESS NOTES
2030-alert and oriented x 4, Lungs CTA on RA. BS active x 4 quads. Pain rated 5/10. Bed not working at this time, will find another one. On tele box 23 showing NSR.    2210-Morphine given for pain rated 7/10.    2309-Pain decreased to 3/10.    0004-blood drawn for labs. 0254-Morphine given for pain rated 7/10.    0347-Pain decreased to 3/10.    0715-Morphine given for pain rated 9/10. Incentive spirometer given to patient and he was instructed in use. Incentive spirometer up to 1500. Shift summary-ambulates independently, voiding dark kenji urine. Morphine given PRN and is controlling pain.

## 2020-10-11 NOTE — PROGRESS NOTES
Hospitalist Progress Note    Patient: Fara Juarez MRN: [id number]  CSN: [id number]    YOB: 1960  Age: 62 y.o. Sex: male    DOA: 10/10/2020 LOS:  LOS: 1 day            Assessment/Plan   1. Acute pancreatitis due to alcohol, paincontrolled, lipase elevated today  2. nonhogdkins lymphoma   3. Abdominal pain  4. Leukocytosis due to above, doubt active infection  5. GERD    Plan:  - contineu IV fluids, analgesia  - incentive spirometer  - RUQ US  - monitor WBC but will contineu to hold antibiotics without clear source of infection or fever    Patient Active Problem List   Diagnosis Code    [facility]odgkin's [facility] C85.90    Acute pancreatitis K85.90    S/P splenectomy Z90.81    Other and unspecified hyperlipidemia E78.5    Cervical spinal stenosis M48.02    Cervical spondylosis M47.812    Radiculopathy affecting upper extremity M54.10    Hypercholesteremia E78.00    GERD (gastroesophageal reflux disease) K21.9    HTN (hypertension) I10    Probable sepsis R68.89               Subjective:    cc: \" Im ok\"  No acute events overnight  Abdominal pain persists, rates as 4/10 currently ,morphine effective analgesia      REVIEW OF SYSTEMS:  General: No fevers or chills. Cardiovascular: No chest pain or pressure. No palpitations. Pulmonary: No shortness of breath. Gastrointestinal: No nausea, vomiting. Objective:        Vital signs/Intake and Output:  Visit Vitals  /87   Pulse 94   Temp 98.3 °F (36.8 °C)   Resp 19   Ht 5' 8\" (1.727 m)   Wt 90.3 kg (199 lb)   SpO2 95%   BMI 30.26 kg/m²     Current Shift:  No intake/output data recorded. Last three shifts:  10/09 1901 - 10/11 0700  In: 2389 [I.V.:2389]  Out: -     Body mass index is 30.26 kg/m².     Physical Exam:  GEN: Alert and oriented times three NAD  EYES: conjunctiva normal, lids with out lesions  HEENT: MMM, No thyromegaly, no lymphadenopathy  HEART: RRR +S1 +S2, no JVD, pulses 2+ distally  LUNGS: CTA B/L no wheezes, rales or rhonchi  ABDOMEN: + BS, soft NT/ND no organomegaly,  No rebound  EXTREMITIES: No edema cyanosis, cap refill normal   SKIN: no rashes or skin breakdown, no nodules, normal turgor  Current Facility-Administered Medications   Medication Dose Route Frequency    labetaloL (NORMODYNE;TRANDATE) 20 mg/4 mL (5 mg/mL) injection 10 mg  10 mg IntraVENous Q4H PRN    meropenem (MERREM) 1 g in sterile water (preservative free) 20 mL IV syringe  1 g IntraVENous Q8H    sodium chloride (NS) flush 5-10 mL  5-10 mL IntraVENous PRN    morphine injection 2 mg  2 mg IntraVENous Q4H PRN    ondansetron (ZOFRAN) injection 4 mg  4 mg IntraVENous Q4H PRN    0.9% sodium chloride infusion  125 mL/hr IntraVENous CONTINUOUS    enoxaparin (LOVENOX) injection 40 mg  40 mg SubCUTAneous Q24H    pantoprazole (PROTONIX) 40 mg in 0.9% sodium chloride 10 mL injection  40 mg IntraVENous DAILY         All the patient's labs over the past 24 hours were reviewed both during my initial daily workflow process and at the time notated as \"note time\" in [facility]. (It is not time stamped separately in this workflow.)  Select labs are listed below.         Labs: Results:       Chemistry Recent Labs     10/11/20  0004 10/10/20  1435   GLU 83 98    136   K 4.4 4.0    97*   CO2 28 30   BUN 19* 15   CREA 0.72 0.87   CA 8.7 9.6   AGAP 7 9   BUCR 26* 17   AP 63 64   TP 6.7 7.5   ALB 3.2* 3.6   GLOB 3.5 3.9   AGRAT 0.9 0.9      CBC w/Diff Recent Labs     10/11/20  0004 10/10/20  1435   WBC 20.9* 20.3*   RBC 4.90 5.05   HGB 15.9 16.7*   HCT 48.7* 49.6*   * 407   GRANS 70 68   LYMPH 16* 23   EOS 2 1              Lipid Panel Lab Results   Component Value Date/Time    Cholesterol, total 91 04/28/2014 05:04 AM    HDL Cholesterol 41 04/28/2014 05:04 AM    LDL, calculated 38.4 04/28/2014 05:04 AM    VLDL, calculated 11.6 04/28/2014 05:04 AM    Triglyceride 58 04/28/2014 05:04 AM    CHOL/HDL Ratio 2.2 04/28/2014 05:04 AM          Liver Enzymes Recent Labs     10/11/20  0004   TP 6.7   ALB 3.2*   AP 63          Procedures/imaging: see electronic medical records for all procedures/Xrays and details which were not copied into this note but were reviewed prior to creation of [address] [address], DO  Internal Medicine/Geriatrics

## 2020-10-11 NOTE — ROUTINE PROCESS
Bedside and Verbal shift change report given to MORRIS Naranjo RN (oncoming nurse) by Heber Valdez RN (offgoing nurse). Report included the following information SBAR, Kardex, Intake/Output and MAR.

## 2020-10-11 NOTE — PROGRESS NOTES
0800 - Shift report received from Rei Bowles RN. Assumed care of patient. 9352 - Assessment completed as per flowsheet. Patient alert and oriented x4. Denies any SOB/chest pain. C/O abdominal pain 6/10. Patient resting in bed with call light in reach. 1133 - PRN Morphine administered for 6/10 abdominal pain. Patient ambulated to restroom and performed personal hygiene. 1608 - PRN Morphine administered for 5/10 pain to abdomen. Patient resting in bed with call light in reach.

## 2020-10-12 PROBLEM — Z20.822 PERSON UNDER INVESTIGATION FOR COVID-19: Status: ACTIVE | Noted: 2020-10-12

## 2020-10-12 LAB
ALBUMIN SERPL-MCNC: 2.9 G/DL (ref 3.4–5)
ALBUMIN/GLOB SERPL: 0.8 {RATIO} (ref 0.8–1.7)
ALP SERPL-CCNC: 56 U/L (ref 45–117)
ALT SERPL-CCNC: 30 U/L (ref 16–61)
ANION GAP SERPL CALC-SCNC: 9 MMOL/L (ref 3–18)
AST SERPL-CCNC: 25 U/L (ref 10–38)
BACTERIA SPEC CULT: NORMAL
BASOPHILS # BLD: 0 K/UL (ref 0–0.1)
BASOPHILS NFR BLD: 0 % (ref 0–2)
BILIRUB SERPL-MCNC: 0.5 MG/DL (ref 0.2–1)
BUN SERPL-MCNC: 14 MG/DL (ref 7–18)
BUN/CREAT SERPL: 26 (ref 12–20)
CALCIUM SERPL-MCNC: 8.4 MG/DL (ref 8.5–10.1)
CHLORIDE SERPL-SCNC: 104 MMOL/L (ref 100–111)
CO2 SERPL-SCNC: 26 MMOL/L (ref 21–32)
CREAT SERPL-MCNC: 0.53 MG/DL (ref 0.6–1.3)
DIFFERENTIAL METHOD BLD: ABNORMAL
EOSINOPHIL # BLD: 0.7 K/UL (ref 0–0.4)
EOSINOPHIL NFR BLD: 4 % (ref 0–5)
ERYTHROCYTE [DISTWIDTH] IN BLOOD BY AUTOMATED COUNT: 13.5 % (ref 11.6–14.5)
GLOBULIN SER CALC-MCNC: 3.5 G/DL (ref 2–4)
GLUCOSE SERPL-MCNC: 70 MG/DL (ref 74–99)
HCT VFR BLD AUTO: 45.5 % (ref 36–48)
HGB BLD-MCNC: 15 G/DL (ref 13–16)
LIPASE SERPL-CCNC: 331 U/L (ref 73–393)
LYMPHOCYTES # BLD: 3.2 K/UL (ref 0.9–3.6)
LYMPHOCYTES NFR BLD: 21 % (ref 21–52)
MCH RBC QN AUTO: 32.8 PG (ref 24–34)
MCHC RBC AUTO-ENTMCNC: 33 G/DL (ref 31–37)
MCV RBC AUTO: 99.6 FL (ref 74–97)
MONOCYTES # BLD: 2 K/UL (ref 0.05–1.2)
MONOCYTES NFR BLD: 13 % (ref 3–10)
NEUTS SEG # BLD: 9.7 K/UL (ref 1.8–8)
NEUTS SEG NFR BLD: 62 % (ref 40–73)
PLATELET # BLD AUTO: 436 K/UL (ref 135–420)
PMV BLD AUTO: 9.4 FL (ref 9.2–11.8)
POTASSIUM SERPL-SCNC: 4.3 MMOL/L (ref 3.5–5.5)
PROT SERPL-MCNC: 6.4 G/DL (ref 6.4–8.2)
RBC # BLD AUTO: 4.57 M/UL (ref 4.7–5.5)
SERVICE CMNT-IMP: NORMAL
SODIUM SERPL-SCNC: 139 MMOL/L (ref 136–145)
WBC # BLD AUTO: 15.6 K/UL (ref 4.6–13.2)

## 2020-10-12 PROCEDURE — 80053 COMPREHEN METABOLIC PANEL: CPT

## 2020-10-12 PROCEDURE — 74011250636 HC RX REV CODE- 250/636: Performed by: INTERNAL MEDICINE

## 2020-10-12 PROCEDURE — 74011000250 HC RX REV CODE- 250: Performed by: INTERNAL MEDICINE

## 2020-10-12 PROCEDURE — 65660000000 HC RM CCU STEPDOWN

## 2020-10-12 PROCEDURE — 83690 ASSAY OF LIPASE: CPT

## 2020-10-12 PROCEDURE — C9113 INJ PANTOPRAZOLE SODIUM, VIA: HCPCS | Performed by: INTERNAL MEDICINE

## 2020-10-12 PROCEDURE — 85025 COMPLETE CBC W/AUTO DIFF WBC: CPT

## 2020-10-12 RX ADMIN — SODIUM CHLORIDE 40 MG: 9 INJECTION, SOLUTION INTRAMUSCULAR; INTRAVENOUS; SUBCUTANEOUS at 09:05

## 2020-10-12 RX ADMIN — SODIUM CHLORIDE 150 ML/HR: 900 INJECTION, SOLUTION INTRAVENOUS at 04:40

## 2020-10-12 RX ADMIN — MORPHINE SULFATE 2 MG: 2 INJECTION, SOLUTION INTRAMUSCULAR; INTRAVENOUS at 04:16

## 2020-10-12 RX ADMIN — ENOXAPARIN SODIUM 40 MG: 40 INJECTION SUBCUTANEOUS at 18:07

## 2020-10-12 RX ADMIN — Medication 10 ML: at 06:00

## 2020-10-12 RX ADMIN — Medication 10 ML: at 04:41

## 2020-10-12 RX ADMIN — FOLIC ACID: 5 INJECTION, SOLUTION INTRAMUSCULAR; INTRAVENOUS; SUBCUTANEOUS at 13:20

## 2020-10-12 NOTE — PROGRESS NOTES
1520: Assumed patient care from [address]: Bedside and Verbal shift change report given to [address] [place] [address] (oncoming nurse) by Cher Landau (offgoing nurse). Report included the following information SBAR, Kardex, Procedure Summary, Intake/Output, MAR and Recent Results.

## 2020-10-12 NOTE — PROGRESS NOTES
2020-Patient taken down to ultrasound. 2059-Patient brought back to room. Morphine given for pain rated 9/10.    2117-Alert and oriented x 4. Lungs CTA. BS active x 4 quads and abdomen tender. Morphine given for pain recently. Ambulates in room independently. 2151-Pain decreased to 4/10.  0416-pain 4/10, Morphine given. Labs drawn. Shift summary-up to bathroom independently. Morphine given for pain with positive results. Abdomen continues to be tender, but pain is decreased from yesterday, per patient statement. Urine is dark kenji in color, no noted odor.

## 2020-10-12 NOTE — PROGRESS NOTES
1910 Assumed patient care at this time. Report received from Dago Damon RN.   1192 Shift assessment completed and documented in flow sheets at this time. Patient rating pain 2/10 but declines pain medication at this time. 2257 Reassessment completed. 0230 Reassessment completed. 0720 Bedside and verbal shift change report given to Yosef Clarke RN (oncoming nurse) by Latia Pickett RN (offgoing nurse). Report included the following information: SBAR, Kardex, MAR, and recent results.

## 2020-10-12 NOTE — PROGRESS NOTES
Hospitalist Progress Note-critical care note     Patient: Rodger Cha MRN: [id number]  CSN: [id number]    YOB: 1960  Age: 62 y.o. Sex: male    DOA: 10/10/2020 LOS:  LOS: 2 days            Chief complaint: Person under investigation for COVID-19, GERD, hypertension, acute pancreatitis    Assessment/Plan         Hospital Problems  Date Reviewed: 10/10/2020          Codes Class Noted POA    Person under investigation for COVID-19 ICD-10-CM: Z20.828  ICD-9-CM: V01.79  10/12/2020 Unknown        GERD (gastroesophageal reflux disease) ICD-10-CM: K21.9  ICD-9-CM: 530.81  5/28/2018 Yes        HTN (hypertension) ICD-10-CM: I10  ICD-9-CM: 401.9  5/28/2018 Yes        * (Principal) Acute pancreatitis ICD-10-CM: K85.90  ICD-9-CM: 577.0  4/27/2014 Unknown            Acute pancreatitis due to alcohol,   paincontrolled, lipase wnl   Will start clear liquid     nonhogdkins lymphoma   Stable     HTN   Continue monitoring   Add norvasc     Leukocytosis due to above,  Improving, due to dehydration    GERD  ppi     Person under investigation for covid 19  Results still pending  Continue droplet plus precaution    Subjective: mild abdomen pain, no n/v/fever     35 total min's spent on patient care including >50% on counseling/coordinating care. Discussed the above assessments. also discussed labs, medications and hospital course    Disposition :tbd,   Review of systems:    General: No fevers or chills. Cardiovascular: No chest pain or pressure. No palpitations. Pulmonary: No shortness of breath. Gastrointestinal: No nausea, vomiting.  Mild abdomen pain     Vital signs/Intake and Output:  Visit Vitals  BP (!) 151/91 (BP 1 Location: Left arm, BP Patient Position: Sitting)   Pulse 89   Temp 98.1 °F (36.7 °C)   Resp 16   Ht 5' 8\" (1.727 m)   Wt 90.3 kg (199 lb)   SpO2 94%   BMI 30.26 kg/m²     Current Shift:  10/12 0701 - 10/12 1900  In: 1527 [I.V.:1527]  Out: 550 [Urine:550]  Last three shifts:  10/10 1901 - 10/12 0700  In: 7700 [I.V.:1279]  Out: 1050 [Urine:1050]    Physical Exam:  General: WD, WN. Alert, cooperative, no acute distress    HEENT: NC, Atraumatic. PERRLA, anicteric sclerae. Lungs: CTA Bilaterally. No Wheezing/Rhonchi/Rales. Heart:  Regular  rhythm,  No murmur, No Rubs, No Gallops  Abdomen: Soft, Non distended, mild tender. +Bowel sounds,   Extremities: No c/c/e  Psych:   Not anxious or agitated. Neurologic:  No acute neurological deficit. Labs: Results:       Chemistry Recent Labs     10/12/20  0432 10/11/20  0004 10/10/20  1435   GLU 70* 83 98    137 136   K 4.3 4.4 4.0    102 97*   CO2 26 28 30   BUN 14 19* 15   CREA 0.53* 0.72 0.87   CA 8.4* 8.7 9.6   AGAP 9 7 9   BUCR 26* 26* 17   AP 56 63 64   TP 6.4 6.7 7.5   ALB 2.9* 3.2* 3.6   GLOB 3.5 3.5 3.9   AGRAT 0.8 0.9 0.9      CBC w/Diff Recent Labs     10/12/20  0432 10/11/20  0004 10/10/20  1435   WBC 15.6* 20.9* 20.3*   RBC 4.57* 4.90 5.05   HGB 15.0 15.9 16.7*   HCT 45.5 48.7* 49.6*   * 424* 407   GRANS 62 70 68   LYMPH 21 16* 23   EOS 4 2 1      Cardiac Enzymes No results for input(s): CPK, CKND1, SURENDRA in the last 72 hours. No lab exists for component: CKRMB, TROIP   Coagulation No results for input(s): PTP, INR, APTT, INREXT, INREXT in the last 72 hours. Lipid Panel Lab Results   Component Value Date/Time    Cholesterol, total 91 04/28/2014 05:04 AM    HDL Cholesterol 41 04/28/2014 05:04 AM    LDL, calculated 38.4 04/28/2014 05:04 AM    VLDL, calculated 11.6 04/28/2014 05:04 AM    Triglyceride 96 10/10/2020 02:35 PM    CHOL/HDL Ratio 2.2 04/28/2014 05:04 AM      BNP No results for input(s): BNPP in the last 72 hours.    Liver Enzymes Recent Labs     10/12/20  0432   TP 6.4   ALB 2.9*   AP 56      Thyroid Studies No results found for: T4, T3U, TSH, TSHEXT, TSHEXT     Procedures/imaging: see electronic medical records for all procedures/Xrays and details which were not copied into this note but were reviewed prior to creation of Plan    Ct Chest Abd Pelv W Cont    Result Date: 10/10/2020  EXAM: CT CHEST, ABDOMEN AND PELVIS CLINICAL INDICATION/HISTORY: Fever. Abdominal pain. COMPARISON: 5/20/2018 TECHNIQUE: Axial CT imaging of the chest, abdomen, and pelvis was performed with intravenous contrast. Multiplanar reformats were generated. One or more dose reduction techniques were used on this CT: automated exposure control, adjustment of the mAs and/or kVp according to patient size, and iterative reconstruction techniques. The specific techniques used on this CT exam have been documented in the patient's electronic medical record. Digital Imaging and Communications in Medicine (DICOM) format image data are available to nonaffiliated external healthcare facilities or entities on a secure, media free, reciprocally searchable basis with patient authorization for at least a 12-month period after this study. ________________ FINDINGS: CHEST: LUNGS: Foci mild subsegmental atelectasis. No suspicious opacities. PLEURA: Normal. AIRWAY: Normal. MEDIASTINUM: Normal heart size. No pericardial effusion. Mild coronary and aortic atherosclerotic calcifications. LYMPH NODES: No enlarged lymph nodes. OTHER: None. _______________ ABDOMEN/PELVIS: LIVER, BILIARY: Mild hepatic steatosis. Liver has a somewhat lobulated contour. No suspicious mass lesion. No biliary dilation. Gallbladder is unremarkable. SPLEEN: Surgically absent. PANCREAS: There is some fat stranding around the pancreas most notable at the head. Region of relative hyperdensity in the head of the pancreas is likely accentuated by surrounding fatty infiltration and appears similar to prior study (and similar dating back to 4/27/2014). No ductal dilation. ADRENALS: Normal. KIDNEYS/URETERS/BLADDER: Simple cyst lower pole left kidney, otherwise unremarkable. LYMPH NODES: No enlarged lymph nodes. GASTROINTESTINAL TRACT: Evidence of prior sigmoid anastomosis.  No evidence of obstruction or acute inflammation. VASCULATURE: Mild atherosclerotic calcifications. PELVIC ORGANS: Unremarkable. BONES: No acute or aggressive osseous abnormalities identified. OTHER: None. _______________     IMPRESSION: 1. Peripancreatic fat stranding suspicious for acute interstitial edematous pancreatitis. No glandular necrosis or fluid collection. 2. Hepatic steatosis. 3. No evidence of acute infection. [address] [facility]    Result Date: 10/12/2020  EXAM: Limited right upper quadrant abdominal ultrasound INDICATION: Pancreatitis. COMPARISON: CT chest, abdomen, and pelvis 10/10/2020. TECHNIQUE: Real-time abdomen/right upper quadrant sonography in multiple planes was performed with image documentation. Grayscale, color flow Doppler imaging, and velocity spectral waveform analysis of the portal vein was performed (duplex imaging). _______________ FINDINGS: LIVER: Diffusely increased hepatic parenchymal echotexture is noted. . No focal mass Color flow Doppler and velocity spectral waveform analysis of the portal vein shows normal (hepatopedal) direction of flow. Mooney Quince BILIARY SYSTEM: No intrahepatic biliary dilatation. Common bile duct is normal in caliber measuring 0.5 cm. GALLBLADDER: No gallstones or gallbladder wall thickening. No pericholecystic fluid. RIGHT KIDNEY: 11.0 cm in length. No hydronephrosis or renal mass. No visible calculi. PANCREAS: Not well visualized secondary to overlying bowel gas. IVC: Visualized portions are unremarkable in appearance. OTHER: No free intraperitoneal fluid. _______________     IMPRESSION: 1. Hepatic steatosis. 2. Limited visualization of the pancreas secondary to bowel gas. 3. Normal ultrasound appearance to the gallbladder. Susi [facility] Chest [facility]    Result Date: 10/10/2020  EXAM: CHEST RADIOGRAPH CLINICAL INDICATION/HISTORY: Sepsis -Additional: None COMPARISON: May 28, 2018 TECHNIQUE: Portable frontal view of the chest _______________ FINDINGS: SUPPORT DEVICES: None.  HEART AND MEDIASTINUM: No appreciable cardiomegaly. Remaining mediastinal contours within normal limits. LUNGS AND PLEURAL SPACES: There is linear opacity at the left lung base. No consolidation, mass, or pleural effusion.  BONY THORAX AND SOFT TISSUES: Unremarkable. _______________     IMPRESSION: No acute pulmonary findings Left basilar scarring and/or atelectasis      Joann Gallego MD

## 2020-10-12 NOTE — ROUTINE PROCESS
Bedside and Verbal shift change report given to [address] [place] [address] Oar (oncoming nurse) by Minnie Lance RN (offgoing nurse). Report included the following information SBAR, Kardex, Intake/Output and MAR.

## 2020-10-12 NOTE — PROGRESS NOTES
Problem: Falls - Risk of  Goal: *Absence of Falls  Description: Document Ivin Yates Fall Risk and appropriate interventions in the flowsheet.   Outcome: Progressing Towards Goal  Note: Fall Risk Interventions:            Medication Interventions: Evaluate medications/consider consulting pharmacy, Teach patient to arise slowly    Elimination Interventions: Call light in reach, Patient to call for help with toileting needs, Urinal in reach

## 2020-10-13 VITALS
OXYGEN SATURATION: 98 % | DIASTOLIC BLOOD PRESSURE: 113 MMHG | WEIGHT: 203.7 LBS | TEMPERATURE: 98.1 F | SYSTOLIC BLOOD PRESSURE: 180 MMHG | RESPIRATION RATE: 20 BRPM | HEART RATE: 89 BPM | HEIGHT: 68 IN | BODY MASS INDEX: 30.87 KG/M2

## 2020-10-13 LAB
ALBUMIN SERPL-MCNC: 2.7 G/DL (ref 3.4–5)
ALBUMIN/GLOB SERPL: 0.8 {RATIO} (ref 0.8–1.7)
ALP SERPL-CCNC: 53 U/L (ref 45–117)
ALT SERPL-CCNC: 28 U/L (ref 16–61)
ANION GAP SERPL CALC-SCNC: 9 MMOL/L (ref 3–18)
AST SERPL-CCNC: 40 U/L (ref 10–38)
BASOPHILS # BLD: 0 K/UL (ref 0–0.1)
BASOPHILS NFR BLD: 0 % (ref 0–2)
BILIRUB SERPL-MCNC: 0.6 MG/DL (ref 0.2–1)
BUN SERPL-MCNC: 8 MG/DL (ref 7–18)
BUN/CREAT SERPL: 16 (ref 12–20)
CALCIUM SERPL-MCNC: 8.3 MG/DL (ref 8.5–10.1)
CHLORIDE SERPL-SCNC: 105 MMOL/L (ref 100–111)
CO2 SERPL-SCNC: 25 MMOL/L (ref 21–32)
CREAT SERPL-MCNC: 0.5 MG/DL (ref 0.6–1.3)
DIFFERENTIAL METHOD BLD: ABNORMAL
EOSINOPHIL # BLD: 0.6 K/UL (ref 0–0.4)
EOSINOPHIL NFR BLD: 4 % (ref 0–5)
ERYTHROCYTE [DISTWIDTH] IN BLOOD BY AUTOMATED COUNT: 13.2 % (ref 11.6–14.5)
GLOBULIN SER CALC-MCNC: 3.5 G/DL (ref 2–4)
GLUCOSE SERPL-MCNC: 75 MG/DL (ref 74–99)
HCT VFR BLD AUTO: 42.2 % (ref 36–48)
HGB BLD-MCNC: 14 G/DL (ref 13–16)
LIPASE SERPL-CCNC: 238 U/L (ref 73–393)
LYMPHOCYTES # BLD: 3.1 K/UL (ref 0.9–3.6)
LYMPHOCYTES NFR BLD: 21 % (ref 21–52)
MCH RBC QN AUTO: 32.6 PG (ref 24–34)
MCHC RBC AUTO-ENTMCNC: 33.2 G/DL (ref 31–37)
MCV RBC AUTO: 98.1 FL (ref 74–97)
MONOCYTES # BLD: 2 K/UL (ref 0.05–1.2)
MONOCYTES NFR BLD: 14 % (ref 3–10)
NEUTS SEG # BLD: 9 K/UL (ref 1.8–8)
NEUTS SEG NFR BLD: 61 % (ref 40–73)
PLATELET # BLD AUTO: 459 K/UL (ref 135–420)
PMV BLD AUTO: 9.3 FL (ref 9.2–11.8)
POTASSIUM SERPL-SCNC: 4.4 MMOL/L (ref 3.5–5.5)
PROT SERPL-MCNC: 6.2 G/DL (ref 6.4–8.2)
RBC # BLD AUTO: 4.3 M/UL (ref 4.7–5.5)
SARS-COV-2, COV2NT: NOT DETECTED
SODIUM SERPL-SCNC: 139 MMOL/L (ref 136–145)
SOURCE, COVRS: NORMAL
SPECIMEN TYPE, XMCV1T: NORMAL
WBC # BLD AUTO: 14.8 K/UL (ref 4.6–13.2)

## 2020-10-13 PROCEDURE — 74011250636 HC RX REV CODE- 250/636: Performed by: INTERNAL MEDICINE

## 2020-10-13 PROCEDURE — 85025 COMPLETE CBC W/AUTO DIFF WBC: CPT

## 2020-10-13 PROCEDURE — C9113 INJ PANTOPRAZOLE SODIUM, VIA: HCPCS | Performed by: INTERNAL MEDICINE

## 2020-10-13 PROCEDURE — 80053 COMPREHEN METABOLIC PANEL: CPT

## 2020-10-13 PROCEDURE — 83690 ASSAY OF LIPASE: CPT

## 2020-10-13 PROCEDURE — 74011000250 HC RX REV CODE- 250: Performed by: INTERNAL MEDICINE

## 2020-10-13 RX ADMIN — FOLIC ACID: 5 INJECTION, SOLUTION INTRAMUSCULAR; INTRAVENOUS; SUBCUTANEOUS at 09:45

## 2020-10-13 RX ADMIN — SODIUM CHLORIDE 40 MG: 9 INJECTION, SOLUTION INTRAMUSCULAR; INTRAVENOUS; SUBCUTANEOUS at 09:46

## 2020-10-13 NOTE — PROGRESS NOTES
Patient is to go home on quarantine until the results of her covid test is known and is negative. If the covid test is positive, then he is to remain under quarantine for a total of 14 days.

## 2020-10-13 NOTE — PROGRESS NOTES
DC Plan: Discharge home with MD follow up once medically stable    Chart reviewed. Ptr admitted to tele by hospitalist for acute pancreatitis. No dc order noted. Per chart review, pt independent and lives alone. Pt covid pending 10/10. CMS will assist with needed follow up appts/referrals at time of discharge. No other anticipated dc needs.  CM will cont to follow for transition of care needs [phone]    Care Management Interventions  PCP Verified by CM: Yes(1 month ago)  Mode of Transport at Discharge: Self  Transition of Care Consult (CM Consult): Discharge Planning  Current Support Network: Lives Alone  Confirm Follow Up Transport: Self  The Plan for Transition of Care is Related to the Following Treatment Goals : home when medically stable  The Patient and/or Patient Representative was Provided with a Choice of Provider and Agrees with the Discharge Plan?: Yes  Name of the Patient Representative Who was Provided with a Choice of Provider and Agrees with the Discharge Plan: Eric Gentile (patient)  Discharge Location  Discharge Placement: Home

## 2020-10-13 NOTE — PROGRESS NOTES
1111-Pt stable, awaiting COVID results said \"I'm ready to go home. \"    1338-Paged MD Aniyah Ibarra, pt inquiring about being discharged home. 1422-Spoke to MD bethea made her aware pt wants to know if he can go home. 1548-Pt on regular diet MD wants to see if pt can tolerate regular food then may possible be D/C home.

## 2020-10-13 NOTE — ROUTINE PROCESS
1630- Verbal shift change report given to Sunitha MERCADO by Jhonatan Causey RN. Report included the following information SBAR, Kardex,  Summary, Intake/Output and MAR.

## 2020-10-13 NOTE — PROGRESS NOTES
Hospitalist Progress Note    Patient: Ruben Sky MRN: [id number]  CSN: [id number]    YOB: 1960  Age: 62 y.o. Sex: male    DOA: 10/10/2020 LOS:  LOS: 3 days          Chief Complaint:      Assessment/Plan     Acute pancreatitis due to alcohol -  Pain controlled, lipase wnl   Will start advance diet      Nonhogdkins lymphoma -  Stable      HTN -  Continue monitoring   Add norvasc      Leukocytosis due to above -  Improving     GERD -  Continue PPI     Person under investigation for covid 19 -  Results still pending  Continue droplet plus precaution    Disposition :  Patient Active Problem List   Diagnosis Code    [facility] C85.90    Acute pancreatitis K85.90    S/P splenectomy Z90.81    Other and unspecified hyperlipidemia E78.5    Cervical spinal stenosis M48.02    Cervical spondylosis M47.812    Radiculopathy affecting upper extremity M54.10    Hypercholesteremia E78.00    GERD (gastroesophageal reflux disease) K21.9    HTN (hypertension) I10    Probable sepsis R68.89    Person under investigation for COVID-19 Z20.828     Subjective:    Feeling fine. Wants to eat. Review of systems:    Constitutional: denies fevers, chills, myalgias  Respiratory: denies SOB, cough  Cardiovascular: denies chest pain, palpitations  Gastrointestinal: denies nausea, vomiting, diarrhea      Vital signs/Intake and Output:  Visit Vitals  BP (!) 151/93 (BP 1 Location: Left arm, BP Patient Position: At rest;Sitting)   Pulse 88   Temp 98 °F (36.7 °C)   Resp 20   Ht 5' 8\" (1.727 m)   Wt 92.4 kg (203 lb 11.2 oz)   SpO2 98%   BMI 30.97 kg/m²     Current Shift:  10/13 0701 - 10/13 1900  In: -   Out: 400 [Urine:400]  Last three shifts:  10/11 1901 - 10/13 0700  In: 3863.5 [P.O.:200;  I.V.:3663.5]  Out: 1700 [Urine:1700]    Exam:    General: Well developed, alert, NAD, OX3  Head/Neck: NCAT, supple, No masses, No lymphadenopathy  CVS:Regular rate and rhythm, no M/R/G, S1/S2 heard, no thrill  Lungs:Clear to auscultation bilaterally, no wheezes, rhonchi, or rales  Abdomen: Soft, Nontender, No distention, Normal Bowel sounds, No hepatomegaly  Extremities: No C/C/E, pulses palpable 2+  Skin:normal texture and turgor, no rashes, no lesions  Neuro:grossly normal , follows commands  Psych:appropriate                Labs: Results:       Chemistry Recent Labs     10/13/20  0230 10/12/20  0432 10/11/20  0004   GLU 75 70* 83    139 137   K 4.4 4.3 4.4    104 102   CO2 25 26 28   BUN 8 14 19*   CREA 0.50* 0.53* 0.72   CA 8.3* 8.4* 8.7   AGAP 9 9 7   BUCR 16 26* 26*   AP 53 56 63   TP 6.2* 6.4 6.7   ALB 2.7* 2.9* 3.2*   GLOB 3.5 3.5 3.5   AGRAT 0.8 0.8 0.9      CBC w/Diff Recent Labs     10/13/20  0230 10/12/20  0432 10/11/20  0004   WBC 14.8* 15.6* 20.9*   RBC 4.30* 4.57* 4.90   HGB 14.0 15.0 15.9   HCT 42.2 45.5 48.7*   * 436* 424*   GRANS 61 62 70   LYMPH 21 21 16*   EOS 4 4 2      Cardiac Enzymes No results for input(s): CPK, CKND1, SURENDRA in the last 72 hours. No lab exists for component: CKRMB, TROIP   Coagulation No results for input(s): PTP, INR, APTT, INREXT in the last 72 hours. Lipid Panel Lab Results   Component Value Date/Time    Cholesterol, total 91 04/28/2014 05:04 AM    HDL Cholesterol 41 04/28/2014 05:04 AM    LDL, calculated 38.4 04/28/2014 05:04 AM    VLDL, calculated 11.6 04/28/2014 05:04 AM    Triglyceride 96 10/10/2020 02:35 PM    CHOL/HDL Ratio 2.2 04/28/2014 05:04 AM      BNP No results for input(s): BNPP in the last 72 hours.    Liver Enzymes Recent Labs     10/13/20  0230   TP 6.2*   ALB 2.7*   AP 53      Thyroid Studies No results found for: T4, T3U, TSH, TSHEXT     Procedures/imaging: see electronic medical records for all procedures/Xrays and details which were not copied into this note but were reviewed prior to creation of Kai Sethi MD

## 2020-10-14 NOTE — PROGRESS NOTES
36 Pt received from offgoing nurse without any signs or symptoms of distress. Pt bed in low position with wheels locked and call bell within reach. 1710 Assessment completed and documented in flow sheet. Pt denies any further needs at this time. Pt in NAD with bed in low position, wheels locked and call bell within reach. Pt ate all of his dinner and was able to keep it down without any nausea or pain. Hospitalist made aware and is planning to discharge pt.  [phone] discharge instructions provided to pt with understanding verbalized. 3999 pt escorted out to private vehicle and discharged home safely.

## 2020-10-16 LAB
BACTERIA SPEC CULT: NORMAL
BACTERIA SPEC CULT: NORMAL
SERVICE CMNT-IMP: NORMAL
SERVICE CMNT-IMP: NORMAL

## 2020-11-02 NOTE — DISCHARGE SUMMARY
Discharge Summary    Patient: Erik Amezcua MRN: [id number]  CSN: [id number]    YOB: 1960  Age: 62 y.o.   Sex: male    DOA: 10/10/2020 LOS:  LOS: 3 days   Discharge Date: 10/13/2020     Primary Care Provider:  Consuelo Salinas MD    Admission Diagnoses: Acute pancreatitis [K85.90]    Discharge Diagnoses:    Problem List as of 10/13/2020 Date Reviewed: 10/10/2020          Codes Class Noted - Resolved    Person under investigation for COVID-19 ICD-10-CM: Z20.828  ICD-9-CM: V01.79  10/12/2020 - Present        Hypercholesteremia ICD-10-CM: E78.00  ICD-9-CM: 272.0  5/28/2018 - Present        GERD (gastroesophageal reflux disease) ICD-10-CM: K21.9  ICD-9-CM: 530.81  5/28/2018 - Present        HTN (hypertension) ICD-10-CM: I10  ICD-9-CM: 401.9  5/28/2018 - Present        Probable sepsis ICD-10-CM: R68.89  ICD-9-CM: 780.99  5/28/2018 - Present        Cervical spinal stenosis ICD-10-CM: M48.02  ICD-9-CM: 723.0  1/16/2018 - Present        Cervical spondylosis ICD-10-CM: M47.812  ICD-9-CM: 721.0  1/16/2018 - Present        Radiculopathy affecting upper extremity ICD-10-CM: M54.10  ICD-9-CM: 723.4  1/16/2018 - Present        NHL (non-Hodgkin's lymphoma) ([facility] (Chronic) ICD-10-CM: C85.90  ICD-9-CM: 202.80  4/27/2014 - Present        * (Principal) Acute pancreatitis ICD-10-CM: K85.90  ICD-9-CM: 577.0  4/27/2014 - Present        S/P splenectomy ICD-10-CM: Z90.81  ICD-9-CM: V45.79  4/27/2014 - Present        Other and unspecified hyperlipidemia ICD-10-CM: E78.5  ICD-9-CM: 272.4  4/27/2014 - Present        RESOLVED: Hypomagnesemia ICD-10-CM: E83.42  ICD-9-CM: 275.2  5/29/2018 - 10/10/2020        RESOLVED: Colitis ICD-10-CM: K52.9  ICD-9-CM: 558.9  5/28/2018 - 10/10/2020        RESOLVED: Hypokalemia ICD-10-CM: E87.6  ICD-9-CM: 276.8  5/28/2018 - 10/10/2020              Discharge Medications:     Discharge Medication List as of 10/13/2020  6:27 PM      CONTINUE these medications which have NOT CHANGED    Details meloxicam (MOBIC) 7.5 mg tablet Take 7.5 mg by mouth daily. , Historical Med      oxyCODONE-acetaminophen (PERCOCET) 5-325 mg per tablet Take 1 Tab by mouth every four (4) hours as needed. Max Daily Amount: 6 Tabs., Print, Disp-10 Tab, R-0      aspirin delayed-release 81 mg tablet Take  by mouth daily. , Historical Med      losartan-hydroCHLOROthiazide (HYZAAR) 100-12.5 mg per tablet Take 1 Tab by mouth daily. Indications: hypertension, Historical Med      metoprolol succinate (TOPROL-XL) 25 mg XL tablet Take 25 mg by mouth nightly. Indications: hypertension, Historical Med      rosuvastatin (CRESTOR) 40 mg tablet Take 40 mg by mouth daily. Indications: hypercholesterolemia, Historical Med      MULTIVITAMINS WITH FLUORIDE (MULTI-VITAMIN PO) Take  by mouth daily. , Historical Med      METHYLCELLULOSE (FIBER LAXATIVE PO) Take 5 Caps by mouth two (2) times a day., Historical Med      esomeprazole (NEXIUM) 20 mg capsule Take 40 mg by mouth daily. Indications: gastroesophageal reflux disease, Historical Med             Discharge Condition: improved    Procedures : None    Consults: None      PHYSICAL EXAM   Visit Vitals  BP (!) 180/113 (BP 1 Location: Right arm, BP Patient Position: At rest;Sitting)   Pulse 89   Temp 98.1 °F (36.7 °C)   Resp 20   Ht 5' 8\" (1.727 m)   Wt 92.4 kg (203 lb 11.2 oz)   SpO2 98%   BMI 30.97 kg/m²     General: Awake, cooperative, no acute distress    HEENT: NC, Atraumatic. PERRLA, EOMI. Anicteric sclerae. Lungs:  CTA Bilaterally. No Wheezing/Rhonchi/Rales. Heart:  Regular  rhythm,  No murmur, No Rubs, No Gallops  Abdomen: Soft, Non distended, Non tender. +Bowel sounds,   Extremities: No c/c/e  Psych:   Not anxious or agitated. Neurologic:  No acute neurological deficits.                                    Admission HPI : Georgia Simeon is a 62 y.o. male with past medical history significant for nonhodgkins lymphoma, GERD, HTN presents to the ER with abdominal pain which started about 2-3 days bimal. HE notes pain is epigastric in nature w radiation to the back, associated w mild nausea, low grade fever and myalgia. No vomiting, melena or diarrhea noted. No cough or dyspnea. He states he has been drinking more alcohol than usual.     On presentation to the ER he was afebrile, tachycardic, hypertensive with out hypoxia. Exam w epigastric tenderness. CT abdomen w pancreatitis and atelectasis. Labs w elevated lipase, elevated WBC. Medicine is asked to admit for further management. Hospital Course :   Acute pancreatitis due to alcohol -  Admitted to med/surg, given IVF, pain control, antiemetics, bowel rest  Lipase initially elevated, trended and improved with bowel rest and hydration   Pt discharged when pain was controlled, lipase wnl and diet advanced.     Nonhogdkins lymphoma -  Stable      HTN -  Continue monitoring   Norvasc added     Leukocytosis due to above -  No signs of infection, thought to be stress reason  Improved      GERD -  Continue PPI      Person under investigation for covid 19 -  Patient is to go home on quarantine until the results of his covid test is known and is negative. If the covid test is positive, then he is to remain under quarantine for a total of 14 days. Activity: as tolerated     Diet: cardiac    Follow-up: with PCP in 2-3 weeks    Disposition: Home    Minutes spent on discharge: 35 minutes       Labs: Results:       Chemistry No results for input(s): GLU, NA, K, CL, CO2, BUN, CREA, CA, AGAP, BUCR, TBIL, AP, TP, ALB, GLOB, AGRAT in the last 72 hours. No lab exists for component: GPT   CBC w/Diff No results for input(s): WBC, RBC, HGB, HCT, PLT, GRANS, LYMPH, EOS, HGBEXT, HCTEXT, PLTEXT in the last 72 hours. Cardiac Enzymes No results for input(s): CPK, CKND1, SURENDRA in the last 72 hours. No lab exists for component: CKRMB, TROIP   Coagulation No results for input(s): PTP, INR, APTT, INREXT in the last 72 hours.     Lipid Panel Lab Results   Component Value Date/Time    Cholesterol, total 91 04/28/2014 05:04 AM    HDL Cholesterol 41 04/28/2014 05:04 AM    LDL, calculated 38.4 04/28/2014 05:04 AM    VLDL, calculated 11.6 04/28/2014 05:04 AM    Triglyceride 96 10/10/2020 02:35 PM    CHOL/HDL Ratio 2.2 04/28/2014 05:04 AM      BNP No results for input(s): BNPP in the last 72 hours. Liver Enzymes No results for input(s): TP, ALB, TBIL, AP in the last 72 hours. No lab exists for component: SGOT, GPT, DBIL   Thyroid Studies No results found for: T4, T3U, TSH, TSHEXT         Significant Diagnostic Studies: Ct Chest Abd Pelv W Cont    Result Date: 10/10/2020  EXAM: CT CHEST, ABDOMEN AND PELVIS CLINICAL INDICATION/HISTORY: Fever. Abdominal pain. COMPARISON: 5/20/2018 TECHNIQUE: Axial CT imaging of the chest, abdomen, and pelvis was performed with intravenous contrast. Multiplanar reformats were generated. One or more dose reduction techniques were used on this CT: automated exposure control, adjustment of the mAs and/or kVp according to patient size, and iterative reconstruction techniques. The specific techniques used on this CT exam have been documented in the patient's electronic medical record. Digital Imaging and Communications in Medicine (DICOM) format image data are available to nonaffiliated external healthcare facilities or entities on a secure, media free, reciprocally searchable basis with patient authorization for at least a 12-month period after this study. ________________ FINDINGS: CHEST: LUNGS: Foci mild subsegmental atelectasis. No suspicious opacities. PLEURA: Normal. AIRWAY: Normal. MEDIASTINUM: Normal heart size. No pericardial effusion. Mild coronary and aortic atherosclerotic calcifications. LYMPH NODES: No enlarged lymph nodes. OTHER: None. _______________ ABDOMEN/PELVIS: LIVER, BILIARY: Mild hepatic steatosis. Liver has a somewhat lobulated contour. No suspicious mass lesion. No biliary dilation. Gallbladder is unremarkable.  SPLEEN: Surgically absent. PANCREAS: There is some fat stranding around the pancreas most notable at the head. Region of relative hyperdensity in the head of the pancreas is likely accentuated by surrounding fatty infiltration and appears similar to prior study (and similar dating back to 4/27/2014). No ductal dilation. ADRENALS: Normal. KIDNEYS/URETERS/BLADDER: Simple cyst lower pole left kidney, otherwise unremarkable. LYMPH NODES: No enlarged lymph nodes. GASTROINTESTINAL TRACT: Evidence of prior sigmoid anastomosis. No evidence of obstruction or acute inflammation. VASCULATURE: Mild atherosclerotic calcifications. PELVIC ORGANS: Unremarkable. BONES: No acute or aggressive osseous abnormalities identified. OTHER: None. _______________     IMPRESSION: 1. Peripancreatic fat stranding suspicious for acute interstitial edematous pancreatitis. No glandular necrosis or fluid collection. 2. Hepatic steatosis. 3. No evidence of acute infection. [address] [place] [address]    Result Date: 10/12/2020  EXAM: Limited right upper quadrant abdominal ultrasound INDICATION: Pancreatitis. COMPARISON: CT chest, abdomen, and pelvis 10/10/2020. TECHNIQUE: Real-time abdomen/right upper quadrant sonography in multiple planes was performed with image documentation. Grayscale, color flow Doppler imaging, and velocity spectral waveform analysis of the portal vein was performed (duplex imaging). _______________ FINDINGS: LIVER: Diffusely increased hepatic parenchymal echotexture is noted. . No focal mass Color flow Doppler and velocity spectral waveform analysis of the portal vein shows normal (hepatopedal) direction of flow. Ronold Kanner BILIARY SYSTEM: No intrahepatic biliary dilatation. Common bile duct is normal in caliber measuring 0.5 cm. GALLBLADDER: No gallstones or gallbladder wall thickening. No pericholecystic fluid. RIGHT KIDNEY: 11.0 cm in length. No hydronephrosis or renal mass. No visible calculi. PANCREAS: Not well visualized secondary to overlying bowel gas.  IVC: Visualized portions are unremarkable in appearance. OTHER: No free intraperitoneal fluid. _______________     IMPRESSION: 1. Hepatic steatosis. 2. Limited visualization of the pancreas secondary to bowel gas. 3. Normal ultrasound appearance to the gallbladder. [facility]    Result Date: 10/10/2020  EXAM: CHEST RADIOGRAPH CLINICAL INDICATION/HISTORY: Sepsis -Additional: None COMPARISON: May 28, 2018 TECHNIQUE: Portable frontal view of the chest _______________ FINDINGS: SUPPORT DEVICES: None. HEART AND MEDIASTINUM: No appreciable cardiomegaly. Remaining mediastinal contours within normal limits. LUNGS AND PLEURAL SPACES: There is linear opacity at the left lung base. No consolidation, mass, or pleural effusion. BONY THORAX AND SOFT TISSUES: Unremarkable. _______________     IMPRESSION: No acute pulmonary findings Left basilar scarring and/or atelectasis        No results found for this or any previous visit.

## 2021-07-14 ENCOUNTER — TRANSCRIBE ORDER (OUTPATIENT)
Dept: SCHEDULING | Age: 61
End: 2021-07-14

## 2021-07-14 DIAGNOSIS — R06.89 DYSPNEA AND RESPIRATORY ABNORMALITY: Primary | ICD-10-CM

## 2021-07-14 DIAGNOSIS — R06.00 DYSPNEA AND RESPIRATORY ABNORMALITY: Primary | ICD-10-CM

## 2021-07-16 ENCOUNTER — HOSPITAL ENCOUNTER (OUTPATIENT)
Dept: NUCLEAR MEDICINE | Age: 61
Discharge: HOME | End: 2021-07-16
Attending: INTERNAL MEDICINE
Payer: COMMERCIAL

## 2021-07-16 ENCOUNTER — HOSPITAL ENCOUNTER (OUTPATIENT)
Dept: GENERAL RADIOLOGY | Age: 61
Discharge: HOME | End: 2021-07-16
Attending: INTERNAL MEDICINE
Payer: COMMERCIAL

## 2021-07-16 DIAGNOSIS — R06.89 DYSPNEA AND RESPIRATORY ABNORMALITY: ICD-10-CM

## 2021-07-16 DIAGNOSIS — R06.00 DYSPNEA AND RESPIRATORY ABNORMALITY: ICD-10-CM

## 2021-07-16 PROCEDURE — A9540 TC99M MAA: HCPCS

## 2021-07-16 PROCEDURE — 71046 X-RAY EXAM CHEST 2 VIEWS: CPT

## 2021-09-24 RX ORDER — MONTELUKAST SODIUM 10 MG/1
10 TABLET ORAL DAILY
COMMUNITY

## 2021-09-24 RX ORDER — METFORMIN HYDROCHLORIDE 500 MG/1
1000 TABLET, EXTENDED RELEASE ORAL
COMMUNITY

## 2021-09-24 RX ORDER — ALBUTEROL SULFATE 90 UG/1
2 AEROSOL, METERED RESPIRATORY (INHALATION)
COMMUNITY

## 2021-09-24 RX ORDER — HYDROCODONE BITARTRATE AND ACETAMINOPHEN 5; 325 MG/1; MG/1
1 TABLET ORAL
COMMUNITY

## 2021-09-24 RX ORDER — TESTOSTERONE CYPIONATE 200 MG/ML
100 INJECTION INTRAMUSCULAR
COMMUNITY

## 2021-09-24 RX ORDER — TAMSULOSIN HYDROCHLORIDE 0.4 MG/1
0.4 CAPSULE ORAL DAILY
COMMUNITY

## 2021-09-24 RX ORDER — ALENDRONATE SODIUM 10 MG/1
10 TABLET ORAL
COMMUNITY

## 2021-09-24 RX ORDER — DULOXETIN HYDROCHLORIDE 60 MG/1
120 CAPSULE, DELAYED RELEASE ORAL DAILY
COMMUNITY

## 2021-09-24 RX ORDER — BUDESONIDE AND FORMOTEROL FUMARATE DIHYDRATE 80; 4.5 UG/1; UG/1
2 AEROSOL RESPIRATORY (INHALATION) 2 TIMES DAILY
COMMUNITY

## 2021-09-24 RX ORDER — TRIAMCINOLONE ACETONIDE 1 MG/G
OINTMENT TOPICAL 2 TIMES DAILY
COMMUNITY

## 2021-09-27 ENCOUNTER — HOSPITAL ENCOUNTER (OUTPATIENT)
Age: 61
Setting detail: OUTPATIENT SURGERY
Discharge: HOME | End: 2021-09-27
Attending: INTERNAL MEDICINE | Admitting: INTERNAL MEDICINE
Payer: COMMERCIAL

## 2021-09-27 VITALS
WEIGHT: 211.6 LBS | RESPIRATION RATE: 20 BRPM | OXYGEN SATURATION: 96 % | TEMPERATURE: 98.1 F | HEIGHT: 68 IN | HEART RATE: 78 BPM | SYSTOLIC BLOOD PRESSURE: 125 MMHG | DIASTOLIC BLOOD PRESSURE: 80 MMHG | BODY MASS INDEX: 32.07 KG/M2

## 2021-09-27 DIAGNOSIS — Z01.810: ICD-10-CM

## 2021-09-27 DIAGNOSIS — R55 SYNCOPE, UNSPECIFIED SYNCOPE TYPE: ICD-10-CM

## 2021-09-27 DIAGNOSIS — R94.39 ABNORMAL STRESS TEST: ICD-10-CM

## 2021-09-27 DIAGNOSIS — I20.0: ICD-10-CM

## 2021-09-27 LAB
ANION GAP SERPL CALC-SCNC: 8 MMOL/L (ref 3–18)
ATRIAL RATE: 84 BPM
BUN SERPL-MCNC: 20 MG/DL (ref 7–18)
BUN/CREAT SERPL: 29 (ref 12–20)
CALCIUM SERPL-MCNC: 9.5 MG/DL (ref 8.5–10.1)
CALCULATED P AXIS, ECG09: 51 DEGREES
CALCULATED R AXIS, ECG10: -84 DEGREES
CALCULATED T AXIS, ECG11: 42 DEGREES
CHLORIDE SERPL-SCNC: 99 MMOL/L (ref 100–111)
CHOLEST SERPL-MCNC: 141 MG/DL
CO2 SERPL-SCNC: 27 MMOL/L (ref 21–32)
CREAT SERPL-MCNC: 0.69 MG/DL (ref 0.6–1.3)
DIAGNOSIS, 93000: NORMAL
ERYTHROCYTE [DISTWIDTH] IN BLOOD BY AUTOMATED COUNT: 19.3 % (ref 11.6–14.5)
GLUCOSE SERPL-MCNC: 108 MG/DL (ref 74–99)
HCT VFR BLD AUTO: 45.8 % (ref 36–48)
HDLC SERPL-MCNC: 39 MG/DL (ref 40–60)
HDLC SERPL: 3.6 {RATIO} (ref 0–5)
HGB BLD-MCNC: 15.2 G/DL (ref 13–16)
INR PPP: 0.9 (ref 0.8–1.2)
LDLC SERPL CALC-MCNC: 41.4 MG/DL (ref 0–100)
LIPID PROFILE,FLP: ABNORMAL
MAGNESIUM SERPL-MCNC: 1.6 MG/DL (ref 1.6–2.6)
MCH RBC QN AUTO: 28.5 PG (ref 24–34)
MCHC RBC AUTO-ENTMCNC: 33.2 G/DL (ref 31–37)
MCV RBC AUTO: 85.9 FL (ref 78–100)
P-R INTERVAL, ECG05: 160 MS
PLATELET # BLD AUTO: 453 K/UL (ref 135–420)
PMV BLD AUTO: 8.7 FL (ref 9.2–11.8)
POTASSIUM SERPL-SCNC: 4.2 MMOL/L (ref 3.5–5.5)
PROTHROMBIN TIME: 12.1 SEC (ref 11.5–15.2)
Q-T INTERVAL, ECG07: 358 MS
QRS DURATION, ECG06: 98 MS
QTC CALCULATION (BEZET), ECG08: 423 MS
RBC # BLD AUTO: 5.33 M/UL (ref 4.35–5.65)
SODIUM SERPL-SCNC: 134 MMOL/L (ref 136–145)
TRIGL SERPL-MCNC: 303 MG/DL (ref ?–150)
VENTRICULAR RATE, ECG03: 84 BPM
VLDLC SERPL CALC-MCNC: 60.6 MG/DL
WBC # BLD AUTO: 11.4 K/UL (ref 4.6–13.2)

## 2021-09-27 PROCEDURE — 74011250636 HC RX REV CODE- 250/636: Performed by: INTERNAL MEDICINE

## 2021-09-27 PROCEDURE — 77030004522 HC CATH ANGI DX EXPO BSC -A: Performed by: INTERNAL MEDICINE

## 2021-09-27 PROCEDURE — 85027 COMPLETE CBC AUTOMATED: CPT

## 2021-09-27 PROCEDURE — 83735 ASSAY OF MAGNESIUM: CPT

## 2021-09-27 PROCEDURE — C1894 INTRO/SHEATH, NON-LASER: HCPCS | Performed by: INTERNAL MEDICINE

## 2021-09-27 PROCEDURE — 93458 L HRT ARTERY/VENTRICLE ANGIO: CPT | Performed by: INTERNAL MEDICINE

## 2021-09-27 PROCEDURE — 99152 MOD SED SAME PHYS/QHP 5/>YRS: CPT | Performed by: INTERNAL MEDICINE

## 2021-09-27 PROCEDURE — 74011000636 HC RX REV CODE- 636: Performed by: INTERNAL MEDICINE

## 2021-09-27 PROCEDURE — 80061 LIPID PANEL: CPT

## 2021-09-27 PROCEDURE — 77030008543 HC TBNG MON PRSS MRTM -A: Performed by: INTERNAL MEDICINE

## 2021-09-27 PROCEDURE — 77030004521 HC CATH ANGI DX COOK -B: Performed by: INTERNAL MEDICINE

## 2021-09-27 PROCEDURE — 93005 ELECTROCARDIOGRAM TRACING: CPT

## 2021-09-27 PROCEDURE — 85610 PROTHROMBIN TIME: CPT

## 2021-09-27 PROCEDURE — 77030042317 HC BND COMPR HEMSTAT -B: Performed by: INTERNAL MEDICINE

## 2021-09-27 PROCEDURE — 77030013797 HC KT TRNSDUC PRSSR EDWD -A: Performed by: INTERNAL MEDICINE

## 2021-09-27 PROCEDURE — 74011000250 HC RX REV CODE- 250: Performed by: INTERNAL MEDICINE

## 2021-09-27 PROCEDURE — 80048 BASIC METABOLIC PNL TOTAL CA: CPT

## 2021-09-27 PROCEDURE — C1769 GUIDE WIRE: HCPCS | Performed by: INTERNAL MEDICINE

## 2021-09-27 RX ORDER — SODIUM CHLORIDE 9 MG/ML
25 INJECTION, SOLUTION INTRAVENOUS CONTINUOUS
Status: DISCONTINUED | OUTPATIENT
Start: 2021-09-27 | End: 2021-09-27 | Stop reason: HOSPADM

## 2021-09-27 RX ORDER — VERAPAMIL HYDROCHLORIDE 2.5 MG/ML
INJECTION, SOLUTION INTRAVENOUS AS NEEDED
Status: DISCONTINUED | OUTPATIENT
Start: 2021-09-27 | End: 2021-09-27 | Stop reason: HOSPADM

## 2021-09-27 RX ORDER — SODIUM CHLORIDE 0.9 % (FLUSH) 0.9 %
5-40 SYRINGE (ML) INJECTION EVERY 8 HOURS
Status: CANCELLED | OUTPATIENT
Start: 2021-09-27

## 2021-09-27 RX ORDER — HEPARIN SODIUM 1000 [USP'U]/ML
INJECTION, SOLUTION INTRAVENOUS; SUBCUTANEOUS AS NEEDED
Status: DISCONTINUED | OUTPATIENT
Start: 2021-09-27 | End: 2021-09-27 | Stop reason: HOSPADM

## 2021-09-27 RX ORDER — SODIUM CHLORIDE 0.9 % (FLUSH) 0.9 %
5-40 SYRINGE (ML) INJECTION AS NEEDED
Status: CANCELLED | OUTPATIENT
Start: 2021-09-27

## 2021-09-27 RX ORDER — MIDAZOLAM HYDROCHLORIDE 1 MG/ML
INJECTION, SOLUTION INTRAMUSCULAR; INTRAVENOUS AS NEEDED
Status: DISCONTINUED | OUTPATIENT
Start: 2021-09-27 | End: 2021-09-27 | Stop reason: HOSPADM

## 2021-09-27 RX ORDER — HEPARIN SODIUM 200 [USP'U]/100ML
INJECTION, SOLUTION INTRAVENOUS
Status: COMPLETED | OUTPATIENT
Start: 2021-09-27 | End: 2021-09-27

## 2021-09-27 RX ORDER — FENTANYL CITRATE 50 UG/ML
INJECTION, SOLUTION INTRAMUSCULAR; INTRAVENOUS AS NEEDED
Status: DISCONTINUED | OUTPATIENT
Start: 2021-09-27 | End: 2021-09-27 | Stop reason: HOSPADM

## 2021-09-27 RX ADMIN — SODIUM CHLORIDE 25 ML/HR: 900 INJECTION, SOLUTION INTRAVENOUS at 10:44

## 2021-09-27 NOTE — PROGRESS NOTES
1315  Pt returned to care unit post cath,  Tr band intact to right wrist area, site clean,    Lunch given    Tr band removed without incident,  No drainage noted from site,  Area covered with 2x2 and tegaderm.   Neuro vasc assessment of right hand and arm WNL  Pt up to bathroom voided,  Discharge inst given and reviewed with pt and father,  Both verbalize understanding,  Arm bands removed and shredded,  Pt discharged via wheelchair to car in care of father,  Pt denies any pain or discharge at time of discharge

## 2021-09-27 NOTE — ROUTINE PROCESS
Cardiac Cath Lab:  Pre Procedure Chart Check     Patients chart was accessed and reviewed for possible and/or scheduled procedure. Creatinine Clearance:  Serum creatinine: 0.69 mg/dL 09/27/21 1018  Estimated creatinine clearance: 126 mL/min    Total Contrast  Load:  3 x estimated clearance amount=  378ml    75% of Contrast Load:  0.75 x Total Contrast Load=    283.5ml    Recent Labs     09/27/21  1018   WBC 11.4   RBC 5.33   HCT 45.8   HGB 15.2   *   INR 0.9   PTP 12.1   *   K 4.2   BUN 20*   CREA 0.69   GFRAA >60   GFRNA >60   CA 9.5       BMI: Body mass index is 32.17 kg/m². ALLERGIES: No Known Allergies    Lines:        Peripheral IV 09/27/21 Anterior;Distal;Left Forearm (Active)   Site Assessment Clean, dry, & intact 09/27/21 1023   Phlebitis Assessment 0 09/27/21 1023   Dressing Status Clean, dry, & intact 09/27/21 1023   Dressing Type Transparent 09/27/21 1023   Hub Color/Line Status Blue 09/27/21 1023   Alcohol Cap Used Yes 09/27/21 1023          History:    Past Medical History:   Diagnosis Date    Acid reflux     Arthritis     [facility]    non Hodgkin's     Diabetes [facility]     GERD (gastroesophageal reflux disease)     Hypercholesteremia     Hypertension 01/29/2018    BP spiked when administered anesthesia for scheduled surgery. on 1-    Ill-defined condition     menieres    Meniere disease, left     Non-Hodgkin lymphoma (HCC)      Past Surgical History:   Procedure Laterality Date    HX APPENDECTOMY      HX HERNIA REPAIR Bilateral     HX ORTHOPAEDIC      wrist pinning, neck fusion, bilateral rotorcuff, lumbar back surgery    HX TONSILLECTOMY      DE ABDOMEN SURGERY PROC UNLISTED      spleenectomy, laporotomy     Patient Active Problem List   Diagnosis Code    NHL (non-Hodgkin's lymphoma) ([facility] C85.90    Acute pancreatitis K85.90    S/P splenectomy Z90.81    Other and unspecified hyperlipidemia E78.5    Cervical spinal stenosis M48.02    Cervical spondylosis M47.812    Radiculopathy affecting upper extremity M54.10    Hypercholesteremia E78.00    GERD (gastroesophageal reflux disease) K21.9    HTN (hypertension) I10    Probable sepsis R68.89    Person under investigation for COVID-19 Z20.822     378

## 2021-09-27 NOTE — PROGRESS NOTES
Cardiology Progress Note        Patient: Bigg Uriarte        Sex: male          DOA: 9/27/2021  YOB: 1960      Age:  62 y.o.        LOS:  LOS: 0 days   Assessment/Plan   Date of Surgery Update:  Bigg Uriarte was seen and examined. History and physical has been reviewed. The patient has been examined.  There have been no significant clinical changes since the completion of the originally dated History and Physical.    Signed By: Sade Rios MD     September 27, 2021 12:11 PM             Signed By: Sade Rios MD     September 27, 2021

## 2021-09-27 NOTE — DISCHARGE INSTRUCTIONS
Cardiac Catheterization/Angiography Discharge Instructions    *Check the puncture site frequently for swelling or bleeding. If you see any bleeding, lie down and apply pressure over the area with a clean towel or washcloth. Notify your doctor for any redness, swelling, drainage or oozing from the puncture site. Notify your doctor for any fever or chills. *If the leg or arm with the puncture becomes cold, numb or painful, go to the emergency room    *Activity should be limited for the next 24 hours. No heavy lifting (anything over 10 pounds) for five days. *Do not drive for 24 hours. *You may resume your usual diet. Drink more fluids than usual.    *Have a responsible person drive you home and stay with you for at least 24 hours after your heart catheterization/angiography. *You may remove the bandage from your Right and Arm in 24 hours. You may shower in 24 hours. No tub baths, hot tubs or swimming for one week. Do not place any lotions, creams, powders, ointments over the puncture site for one week. You may place a clean band-aid over the puncture site each day for 5 days. Change this daily.

## 2021-09-27 NOTE — Clinical Note
TRANSFER - OUT REPORT:     Verbal report given to: RN. Report consisted of patient's Situation, Background, Assessment and   Recommendations(SBAR). Opportunity for questions and clarification was provided. Patient transported with a Cardiac Cath Tech / Patient Care Tech.

## 2021-10-11 ENCOUNTER — APPOINTMENT (OUTPATIENT)
Dept: CT IMAGING | Age: 61
End: 2021-10-11
Attending: EMERGENCY MEDICINE
Payer: COMMERCIAL

## 2021-10-11 ENCOUNTER — HOSPITAL ENCOUNTER (EMERGENCY)
Age: 61
Discharge: HOME | End: 2021-10-11
Attending: EMERGENCY MEDICINE
Payer: COMMERCIAL

## 2021-10-11 ENCOUNTER — APPOINTMENT (OUTPATIENT)
Dept: GENERAL RADIOLOGY | Age: 61
End: 2021-10-11
Attending: EMERGENCY MEDICINE
Payer: COMMERCIAL

## 2021-10-11 VITALS
HEART RATE: 85 BPM | WEIGHT: 209 LBS | TEMPERATURE: 99.5 F | HEIGHT: 68 IN | OXYGEN SATURATION: 100 % | RESPIRATION RATE: 18 BRPM | SYSTOLIC BLOOD PRESSURE: 141 MMHG | DIASTOLIC BLOOD PRESSURE: 85 MMHG | BODY MASS INDEX: 31.67 KG/M2

## 2021-10-11 DIAGNOSIS — E83.42 HYPOMAGNESEMIA: Primary | ICD-10-CM

## 2021-10-11 DIAGNOSIS — R44.3 HALLUCINATION: ICD-10-CM

## 2021-10-11 DIAGNOSIS — F22 DELUSION (HCC): ICD-10-CM

## 2021-10-11 LAB
ALBUMIN SERPL-MCNC: 2.8 G/DL (ref 3.4–5)
ALBUMIN/GLOB SERPL: 0.7 {RATIO} (ref 0.8–1.7)
ALP SERPL-CCNC: 90 U/L (ref 45–117)
ALT SERPL-CCNC: 26 U/L (ref 16–61)
AMPHET UR QL SCN: NEGATIVE
ANION GAP SERPL CALC-SCNC: 6 MMOL/L (ref 3–18)
APPEARANCE UR: CLEAR
AST SERPL-CCNC: 32 U/L (ref 10–38)
ATRIAL RATE: 93 BPM
BARBITURATES UR QL SCN: NEGATIVE
BASOPHILS # BLD: 0.1 K/UL (ref 0–0.1)
BASOPHILS NFR BLD: 0 % (ref 0–2)
BENZODIAZ UR QL: NEGATIVE
BILIRUB SERPL-MCNC: 0.3 MG/DL (ref 0.2–1)
BILIRUB UR QL: NEGATIVE
BUN SERPL-MCNC: 14 MG/DL (ref 7–18)
BUN/CREAT SERPL: 18 (ref 12–20)
CALCIUM SERPL-MCNC: 10.2 MG/DL (ref 8.5–10.1)
CALCULATED P AXIS, ECG09: 35 DEGREES
CALCULATED R AXIS, ECG10: -54 DEGREES
CALCULATED T AXIS, ECG11: -17 DEGREES
CANNABINOIDS UR QL SCN: NEGATIVE
CHLORIDE SERPL-SCNC: 95 MMOL/L (ref 100–111)
CO2 SERPL-SCNC: 32 MMOL/L (ref 21–32)
COCAINE UR QL SCN: NEGATIVE
COLOR UR: YELLOW
CREAT SERPL-MCNC: 0.77 MG/DL (ref 0.6–1.3)
DIAGNOSIS, 93000: NORMAL
DIFFERENTIAL METHOD BLD: ABNORMAL
EOSINOPHIL # BLD: 0.1 K/UL (ref 0–0.4)
EOSINOPHIL NFR BLD: 1 % (ref 0–5)
ERYTHROCYTE [DISTWIDTH] IN BLOOD BY AUTOMATED COUNT: 17.3 % (ref 11.6–14.5)
GLOBULIN SER CALC-MCNC: 4.1 G/DL (ref 2–4)
GLUCOSE SERPL-MCNC: 98 MG/DL (ref 74–99)
GLUCOSE UR STRIP.AUTO-MCNC: NEGATIVE MG/DL
HCT VFR BLD AUTO: 36.5 % (ref 36–48)
HDSCOM,HDSCOM: NORMAL
HGB BLD-MCNC: 12.3 G/DL (ref 13–16)
HGB UR QL STRIP: NEGATIVE
KETONES UR QL STRIP.AUTO: NEGATIVE MG/DL
LEUKOCYTE ESTERASE UR QL STRIP.AUTO: NEGATIVE
LYMPHOCYTES # BLD: 3.4 K/UL (ref 0.9–3.6)
LYMPHOCYTES NFR BLD: 20 % (ref 21–52)
MAGNESIUM SERPL-MCNC: 1.4 MG/DL (ref 1.6–2.6)
MCH RBC QN AUTO: 28.5 PG (ref 24–34)
MCHC RBC AUTO-ENTMCNC: 33.7 G/DL (ref 31–37)
MCV RBC AUTO: 84.7 FL (ref 78–100)
METHADONE UR QL: NEGATIVE
MONOCYTES # BLD: 2.4 K/UL (ref 0.05–1.2)
MONOCYTES NFR BLD: 14 % (ref 3–10)
NEUTS SEG # BLD: 11.2 K/UL (ref 1.8–8)
NEUTS SEG NFR BLD: 65 % (ref 40–73)
NITRITE UR QL STRIP.AUTO: NEGATIVE
OPIATES UR QL: NEGATIVE
P-R INTERVAL, ECG05: 160 MS
PCP UR QL: NEGATIVE
PH UR STRIP: 6.5 [PH] (ref 5–8)
PLATELET # BLD AUTO: 811 K/UL (ref 135–420)
PMV BLD AUTO: 8.7 FL (ref 9.2–11.8)
POTASSIUM SERPL-SCNC: 4.2 MMOL/L (ref 3.5–5.5)
PROT SERPL-MCNC: 6.9 G/DL (ref 6.4–8.2)
PROT UR STRIP-MCNC: NEGATIVE MG/DL
Q-T INTERVAL, ECG07: 392 MS
QRS DURATION, ECG06: 154 MS
QTC CALCULATION (BEZET), ECG08: 487 MS
RBC # BLD AUTO: 4.31 M/UL (ref 4.35–5.65)
SODIUM SERPL-SCNC: 133 MMOL/L (ref 136–145)
SP GR UR REFRACTOMETRY: 1.01 (ref 1–1.03)
TSH SERPL DL<=0.05 MIU/L-ACNC: 0.81 UIU/ML (ref 0.36–3.74)
UROBILINOGEN UR QL STRIP.AUTO: 1 EU/DL (ref 0.2–1)
VENTRICULAR RATE, ECG03: 93 BPM
WBC # BLD AUTO: 17.3 K/UL (ref 4.6–13.2)

## 2021-10-11 PROCEDURE — 80307 DRUG TEST PRSMV CHEM ANLYZR: CPT

## 2021-10-11 PROCEDURE — 93005 ELECTROCARDIOGRAM TRACING: CPT

## 2021-10-11 PROCEDURE — 80053 COMPREHEN METABOLIC PANEL: CPT

## 2021-10-11 PROCEDURE — 70450 CT HEAD/BRAIN W/O DYE: CPT

## 2021-10-11 PROCEDURE — 71045 X-RAY EXAM CHEST 1 VIEW: CPT

## 2021-10-11 PROCEDURE — 84443 ASSAY THYROID STIM HORMONE: CPT

## 2021-10-11 PROCEDURE — 96365 THER/PROPH/DIAG IV INF INIT: CPT

## 2021-10-11 PROCEDURE — 81003 URINALYSIS AUTO W/O SCOPE: CPT

## 2021-10-11 PROCEDURE — 83735 ASSAY OF MAGNESIUM: CPT

## 2021-10-11 PROCEDURE — 85025 COMPLETE CBC W/AUTO DIFF WBC: CPT

## 2021-10-11 PROCEDURE — 99285 EMERGENCY DEPT VISIT HI MDM: CPT

## 2021-10-11 PROCEDURE — 74011250636 HC RX REV CODE- 250/636: Performed by: EMERGENCY MEDICINE

## 2021-10-11 RX ORDER — MAGNESIUM SULFATE HEPTAHYDRATE 40 MG/ML
2 INJECTION, SOLUTION INTRAVENOUS ONCE
Status: COMPLETED | OUTPATIENT
Start: 2021-10-11 | End: 2021-10-11

## 2021-10-11 RX ADMIN — MAGNESIUM SULFATE HEPTAHYDRATE 2 G: 40 INJECTION, SOLUTION INTRAVENOUS at 09:06

## 2021-10-11 NOTE — ED TRIAGE NOTES
Pt to Triage in W/c with Cc Brought in by his family fro mental health crisis    Reports he was upset at home r/t and issue with his family. Family felt he may be having a \"mental break down\". Pt calm and cooperative in triage. Answering all questions appropriately. Denies SI, denies HI.  Stating, \"I just didn't want to be in that situation\"
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
## 2021-10-11 NOTE — ED PROVIDER NOTES
EMERGENCY DEPARTMENT HISTORY AND PHYSICAL EXAM    Date: 10/11/2021  Patient Name: Paz Fleming    History of Presenting Illness     Chief Complaint   Patient presents with    Other     Mental health concern    Mental Health Problem         History Provided By: Patient and son    Carolina Gilbert is a 61-year-old male with past medical history of GERD, hypertension, non-Hodgkin's lymphoma in remission presents for evaluation of hallucination and paranoid delusions. Yesterday, patient reports that people around his property taking down trees are approximately 20 people. He went down to take a nap, he woke up and they were peeling skin off of him, they had drilled holes into his feet with electrodes and were attempting to have sex with him. Patient has never had any history of hallucination or paranoid delusions previously. Patient has been on oxycodone for a hip replacement previously. Patient denies any suicidal or homicidal ideation. Patient son has brought him in for further evaluation. Patient has had increased stressors at home. He denies any associated chest pain, shortness of breath, fevers, rash. PCP: Rafaela Cheek MD    Current Outpatient Medications   Medication Sig Dispense Refill    albuterol (PROVENTIL HFA, VENTOLIN HFA, PROAIR HFA) 90 mcg/actuation inhaler Take 2 Puffs by inhalation every four (4) hours as needed for Wheezing.  alendronate (FOSAMAX) 10 mg tablet Take 10 mg by mouth Daily (before breakfast).  DULoxetine (CYMBALTA) 60 mg capsule Take 120 mg by mouth daily.  metFORMIN ER (glucophage XR) 500 mg tablet Take 1,000 mg by mouth daily (with dinner).  HYDROcodone-acetaminophen (NORCO) 5-325 mg per tablet Take 1 Tablet by mouth every six (6) hours as needed for Pain.  montelukast (Singulair) 10 mg tablet Take 10 mg by mouth daily.  budesonide-formoteroL (Symbicort) 80-4.5 mcg/actuation HFAA Take 2 Puffs by inhalation two (2) times a day.       tamsulosin (FLOMAX) 0.4 mg capsule Take 0.4 mg by mouth daily.  testosterone cypionate (DEPOTESTOTERONE CYPIONATE) 200 mg/mL injection 100 mg by IntraMUSCular route every twenty-one (21) days.  triamcinolone acetonide (KENALOG) 0.1 % ointment Apply  to affected area two (2) times a day. use thin layer      aspirin delayed-release 81 mg tablet Take  by mouth daily.  losartan-hydroCHLOROthiazide (HYZAAR) 100-12.5 mg per tablet Take 1 Tab by mouth daily. Indications: hypertension      metoprolol succinate (TOPROL-XL) 25 mg XL tablet Take 50 mg by mouth daily. Indications: high blood pressure      rosuvastatin (CRESTOR) 40 mg tablet Take 20 mg by mouth daily. Indications: high cholesterol      MULTIVITAMINS WITH FLUORIDE (MULTI-VITAMIN PO) Take  by mouth daily.  METHYLCELLULOSE (FIBER LAXATIVE PO) Take 5 Caps by mouth two (2) times a day.  esomeprazole (NEXIUM) 20 mg capsule Take 40 mg by mouth daily. Indications: gastroesophageal reflux disease         Past History     Past Medical History:  Past Medical History:   Diagnosis Date    Acid reflux     Arthritis     Cancer ([facility] [address] 1983    non Hodgkin's     Diabetes ([facility] [address]     GERD (gastroesophageal reflux disease)     Hypercholesteremia     Hypertension 01/29/2018    BP spiked when administered anesthesia for scheduled surgery. on 1-    Ill-defined condition     menieres    Meniere disease, left     Non-Hodgkin lymphoma (HCC)        Past Surgical History:  Past Surgical History:   Procedure Laterality Date    HX APPENDECTOMY      HX HERNIA REPAIR Bilateral     HX HIP ARTHROSCOPY      HX ORTHOPAEDIC      wrist pinning, neck fusion, bilateral rotorcuff, lumbar back surgery    HX TONSILLECTOMY      SD ABDOMEN SURGERY PROC UNLISTED      spleenectomy, laporotomy       Family History:  History reviewed. No pertinent family history.     Social History:  Social History     Tobacco Use    Smoking status: Former Smoker     Quit date: 2/12/1995 Years since quittin.6    Smokeless tobacco: Never Used   Vaping Use    Vaping Use: Never used   Substance Use Topics    Alcohol use: Yes     Alcohol/week: 7.0 standard drinks     Types: 7 Cans of beer per week    Drug use: No       Allergies:  No Known Allergies      Review of Systems   Review of Systems   Constitutional: Negative for activity change and fever. HENT: Negative for congestion and sore throat. Eyes: Negative for discharge. Respiratory: Negative for apnea. Cardiovascular: Negative for chest pain. Gastrointestinal: Negative for abdominal distention. Genitourinary: Negative for dysuria and flank pain. Musculoskeletal: Negative for arthralgias. Skin: Negative for rash. Neurological: Negative for dizziness and weakness. Hematological: Negative for adenopathy. Psychiatric/Behavioral: Positive for hallucinations. Negative for agitation. All other systems reviewed and are negative.       Physical Exam     Vitals:    10/11/21 0713 10/11/21 0818 10/11/21 0900 10/11/21 0951   BP:  (!) 137/104 (!) 123/93 (!) 141/85   Pulse:    85   Resp:    18   Temp:       SpO2: 98% 98% 98% 100%   Weight:       Height:         Physical Exam    Nursing notes and vital signs reviewed    Constitutional: Non toxic appearing, No acute distress  Head: Normocephalic, Atraumatic  Eyes: EOMI  Neck: Supple  Cardiovascular: Regular rate and rhythm, no murmurs, rubs, or gallops  Chest: Normal work of breathing and chest excursion bilaterally  Lungs: Clear to ausculation bilaterally  Abdomen: Soft, non tender, non distended, normoactive bowel sounds  Back: No evidence of trauma or deformity  Extremities: No evidence of trauma or deformity, no LE edema  Skin: Warm and dry, normal cap refill  Neuro: Alert and appropriate, CN intact, normal speech, strength and sensation full and symmetric bilaterally, normal gait, normal coordination  Psychiatric: Normal mood and affect      Diagnostic Study Results     Labs -     Recent Results (from the past 12 hour(s))   EKG, 12 LEAD, INITIAL    Collection Time: 10/11/21  7:35 AM   Result Value Ref Range    Ventricular Rate 93 BPM    Atrial Rate 93 BPM    P-R Interval 160 ms    QRS Duration 154 ms    Q-T Interval 392 ms    QTC Calculation (Bezet) 487 ms    Calculated P Axis 35 degrees    Calculated R Axis -54 degrees    Calculated T Axis -17 degrees    Diagnosis       Normal sinus rhythm  Right bundle branch block  Left anterior fascicular block  Bifascicular block  Abnormal ECG  When compared with ECG of 27-SEP-2021 10:10,  Right bundle branch block has replaced Incomplete right bundle branch block     CBC WITH AUTOMATED DIFF    Collection Time: 10/11/21  8:07 AM   Result Value Ref Range    WBC 17.3 (H) 4.6 - 13.2 K/uL    RBC 4.31 (L) 4.35 - 5.65 M/uL    HGB 12.3 (L) 13.0 - 16.0 g/dL    HCT 36.5 36.0 - 48.0 %    MCV 84.7 78.0 - 100.0 FL    MCH 28.5 24.0 - 34.0 PG    MCHC 33.7 31.0 - 37.0 g/dL    RDW 17.3 (H) 11.6 - 14.5 %    PLATELET 024 (H) 428 - 420 K/uL    MPV 8.7 (L) 9.2 - 11.8 FL    NEUTROPHILS 65 40 - 73 %    LYMPHOCYTES 20 (L) 21 - 52 %    MONOCYTES 14 (H) 3 - 10 %    EOSINOPHILS 1 0 - 5 %    BASOPHILS 0 0 - 2 %    ABS. NEUTROPHILS 11.2 (H) 1.8 - 8.0 K/UL    ABS. LYMPHOCYTES 3.4 0.9 - 3.6 K/UL    ABS. MONOCYTES 2.4 (H) 0.05 - 1.2 K/UL    ABS. EOSINOPHILS 0.1 0.0 - 0.4 K/UL    ABS.  BASOPHILS 0.1 0.0 - 0.1 K/UL    DF AUTOMATED     MAGNESIUM    Collection Time: 10/11/21  8:07 AM   Result Value Ref Range    Magnesium 1.4 (L) 1.6 - 2.6 mg/dL   METABOLIC PANEL, COMPREHENSIVE    Collection Time: 10/11/21  8:07 AM   Result Value Ref Range    Sodium 133 (L) 136 - 145 mmol/L    Potassium 4.2 3.5 - 5.5 mmol/L    Chloride 95 (L) 100 - 111 mmol/L    CO2 32 21 - 32 mmol/L    Anion gap 6 3.0 - 18 mmol/L    Glucose 98 74 - 99 mg/dL    BUN 14 7.0 - 18 MG/DL    Creatinine 0.77 0.6 - 1.3 MG/DL    BUN/Creatinine ratio 18 12 - 20      GFR est AA >60 >60 ml/min/1.73m2    GFR est non-AA >60 >60 ml/min/1.73m2    Calcium 10.2 (H) 8.5 - 10.1 MG/DL    Bilirubin, total 0.3 0.2 - 1.0 MG/DL    ALT (SGPT) 26 16 - 61 U/L    AST (SGOT) 32 10 - 38 U/L    Alk. phosphatase 90 45 - 117 U/L    Protein, total 6.9 6.4 - 8.2 g/dL    Albumin 2.8 (L) 3.4 - 5.0 g/dL    Globulin 4.1 (H) 2.0 - 4.0 g/dL    A-G Ratio 0.7 (L) 0.8 - 1.7     TSH 3RD GENERATION    Collection Time: 10/11/21  8:07 AM   Result Value Ref Range    TSH 0.81 0.36 - 3.74 uIU/mL   URINALYSIS W/ RFLX MICROSCOPIC    Collection Time: 10/11/21  8:07 AM   Result Value Ref Range    Color YELLOW      Appearance CLEAR      Specific gravity 1.007 1.005 - 1.030      pH (UA) 6.5 5.0 - 8.0      Protein Negative NEG mg/dL    Glucose Negative NEG mg/dL    Ketone Negative NEG mg/dL    Bilirubin Negative NEG      Blood Negative NEG      Urobilinogen 1.0 0.2 - 1.0 EU/dL    Nitrites Negative NEG      Leukocyte Esterase Negative NEG     DRUG SCREEN, URINE    Collection Time: 10/11/21  8:07 AM   Result Value Ref Range    BENZODIAZEPINES Negative NEG      BARBITURATES Negative NEG      THC (TH-CANNABINOL) Negative NEG      OPIATES Negative NEG      PCP(PHENCYCLIDINE) Negative NEG      COCAINE Negative NEG      AMPHETAMINES Negative NEG      METHADONE Negative NEG      HDSCOM (NOTE)        Radiologic Studies -   CT HEAD WO CONT   Final Result      1. No acute intracranial abnormalities. XR CHEST PORT   Final Result   No acute cardiopulmonary disease. CT Results  (Last 48 hours)               10/11/21 0800  CT HEAD WO CONT Final result    Impression:      1. No acute intracranial abnormalities. Narrative:  EXAM: CT head       CLINICAL INDICATION/HISTORY: AMS     > Additional: None. COMPARISON: None. > Reference Exam: None. TECHNIQUE: Axial CT imaging of the head was performed without intravenous   contrast. Sagittal and coronal reconstructions were performed.   One or more dose   reduction techniques were used on this CT: automated exposure control,   adjustment of the mAs and/or kVp according to patient size, and iterative   reconstruction techniques. The specific techniques used on this CT exam have   been documented in the patient's electronic medical record. Digital Imaging and   Communications in Medicine (DICOM) format image data are available to   nonaffiliated external healthcare facilities or entities on a secure, media   free, reciprocally searchable basis with patient authorization for at least a   12-month period after this study. _______________       FINDINGS:       BRAIN AND POSTERIOR FOSSA: Diffuse atrophy. There is no intracranial hemorrhage,   mass effect, or midline shift. There are no areas of abnormal parenchymal   attenuation. EXTRA-AXIAL SPACES AND MENINGES: There are no abnormal extra-axial fluid   collections. CALVARIUM: Intact. SINUSES: Clear. OTHER: None.       _______________               CXR Results  (Last 48 hours)               10/11/21 0745  XR CHEST PORT Final result    Impression:  No acute cardiopulmonary disease. Narrative:  EXAM:  PORTABLE CHEST       INDICATION:  Altered level of consciousness. TECHNIQUE:  Portable, AP view. COMPARISON: PA and lateral views 07/16/2021       ____________________       FINDINGS:         SUPPORT DEVICES: None. HEART AND MEDIASTINUM: Cardiomediastinal silhouette appears within normal   limits. Normal caliber thoracic aorta. LUNGS AND PLEURAL SPACES: Lungs are well aerated with no confluent airspace   opacity or pulmonary edema. No pleural effusion or pneumothorax. BONY THORAX AND SOFT TISSUES: No acute osseous abnormality. Lower cervical   fusion. Chronic, healed left midclavicular fracture.        ____________________                 Medications given in the ED-  Medications   magnesium sulfate 2 g/50 ml IVPB (premix or compounded) (0 g IntraVENous IV Completed 10/11/21 102)         Medical Decision Making   I am the first provider for this patient. I reviewed the vital signs, available nursing notes, past medical history, past surgical history, family history and social history. Vital Signs-Reviewed the patient's vital signs. Pulse Oximetry Analysis - 98% on room air, not hypoxic     Cardiac Monitor:  Rate: 93 bpm  Rhythm: Normal sinus rhythm    EKG interpretation: (Preliminary)  EKG read by Dr. Kasia Cross at 735  Rate 93 normal sinus rhythm   , right bundle branch block, bifascicular block  New right bundle branch block from September 27, 2021  No ST Elevation    Records Reviewed: Old Medical Records    Provider Notes (Medical Decision Making): Nubia Valdovinos is a 62 y.o. male who presents for hallucination and fixed delusions. On arrival patient is afebrile, nontoxic-appearing and hemodynamically stable. Patient still believes that the episode happened yesterday, though he does not have any evidence of ongoing confusion, altered mental status or hallucinations currently. CT head, chest x-ray, EKG, CBC, CMP, urinalysis, UDS, magnesium obtained. Patient found to have hypomagnesemia at 1.4, labs and imaging otherwise unremarkable. Patient is noted to have a new right bundle branch block from previous incomplete right bundle. He has had cardiac catheterization within the last 3 weeks with minimal luminal irregularities. I suspect the episode last night may have been related to medication use. Patient does have a minimally elevated white blood cell count and thrombocytosis in the setting of previous splenectomy. Discussed with patient's primary care provider Dr. Shahana Lopez who will follow the patient closely and follow-up. Patient declines admission for psychiatric evaluation. Patient will be discharged home, instructed to return immediately for increasing confusion, development of fever or any new or concerning symptoms. Procedures:  Procedures    ED Course:    On repeat evaluation, patient remains hemodynamically stable, he is sitting up, alert without evidence of confusion. Patient discharged in stable condition. Diagnosis and Disposition     DISCHARGE NOTE:    Ruby Blount's  results have been reviewed with him. He has been counseled regarding his diagnosis, treatment, and plan. He verbally conveys understanding and agreement of the signs, symptoms, diagnosis, treatment and prognosis and additionally agrees to follow up as discussed. He also agrees with the care-plan and conveys that all of his questions have been answered. I have also provided discharge instructions for him that include: educational information regarding their diagnosis and treatment, and list of reasons why they would want to return to the ED prior to their follow-up appointment, should his condition change. He has been provided with education for proper emergency department utilization. CLINICAL IMPRESSION:    1. Hypomagnesemia    2. Hallucination    3. Delusion ([address]        PLAN:  1. D/C Home  2. Discharge Medication List as of 10/11/2021 10:21 AM        3. Follow-up Information     Follow up With Specialties Details Why Contact Info    Mayme Babinski, MD Family Medicine   [address] [facility] [id number] [phone]      [facility] EMERGENCY DEPT Emergency Medicine  As needed, If symptoms worsen [address] [place] [id number]  [address] [address]  [phone]        _______________________________      Please note that this dictation was completed with [employer], the computer voice recognition software. Quite often unanticipated grammatical, syntax, homophones, and other interpretive errors are inadvertently transcribed by the computer software. Please disregard these errors. Please excuse any errors that have escaped final proofreading.

## 2022-03-18 PROBLEM — M47.812 CERVICAL SPONDYLOSIS: Status: ACTIVE | Noted: 2018-01-16

## 2022-03-18 PROBLEM — I10 HTN (HYPERTENSION): Status: ACTIVE | Noted: 2018-05-28

## 2022-03-19 PROBLEM — R68.89 PROBABLE SEPSIS: Status: ACTIVE | Noted: 2018-05-28

## 2022-03-19 PROBLEM — M54.10 RADICULOPATHY AFFECTING UPPER EXTREMITY: Status: ACTIVE | Noted: 2018-01-16

## 2022-03-19 PROBLEM — Z20.822 PERSON UNDER INVESTIGATION FOR COVID-19: Status: ACTIVE | Noted: 2020-10-12

## 2022-03-19 PROBLEM — E78.00 HYPERCHOLESTEREMIA: Status: ACTIVE | Noted: 2018-05-28

## 2022-03-19 PROBLEM — M48.02 CERVICAL SPINAL STENOSIS: Status: ACTIVE | Noted: 2018-01-16

## 2022-03-19 PROBLEM — K21.9 GERD (GASTROESOPHAGEAL REFLUX DISEASE): Status: ACTIVE | Noted: 2018-05-28

## 2022-12-27 ENCOUNTER — APPOINTMENT (OUTPATIENT)
Dept: GENERAL RADIOLOGY | Age: 62
End: 2022-12-27
Attending: EMERGENCY MEDICINE
Payer: COMMERCIAL

## 2022-12-27 ENCOUNTER — HOSPITAL ENCOUNTER (EMERGENCY)
Age: 62
Discharge: HOME | End: 2022-12-27
Attending: EMERGENCY MEDICINE
Payer: COMMERCIAL

## 2022-12-27 VITALS
RESPIRATION RATE: 26 BRPM | HEART RATE: 103 BPM | SYSTOLIC BLOOD PRESSURE: 126 MMHG | TEMPERATURE: 97.4 F | OXYGEN SATURATION: 96 % | DIASTOLIC BLOOD PRESSURE: 78 MMHG

## 2022-12-27 DIAGNOSIS — Z90.81 ASPLENIA AFTER SURGICAL PROCEDURE: ICD-10-CM

## 2022-12-27 DIAGNOSIS — R07.1 CHEST PAIN ON BREATHING: Primary | ICD-10-CM

## 2022-12-27 DIAGNOSIS — R06.2 WHEEZING: ICD-10-CM

## 2022-12-27 LAB
ANION GAP SERPL CALC-SCNC: 4 MMOL/L (ref 3–18)
ATRIAL RATE: 120 BPM
BASOPHILS # BLD: 0 K/UL (ref 0–0.1)
BASOPHILS NFR BLD: 0 % (ref 0–2)
BUN SERPL-MCNC: 21 MG/DL (ref 7–18)
BUN/CREAT SERPL: 27 (ref 12–20)
CALCIUM SERPL-MCNC: 10.7 MG/DL (ref 8.5–10.1)
CALCULATED R AXIS, ECG10: -85 DEGREES
CALCULATED T AXIS, ECG11: -167 DEGREES
CHLORIDE SERPL-SCNC: 105 MMOL/L (ref 100–111)
CO2 SERPL-SCNC: 32 MMOL/L (ref 21–32)
CREAT SERPL-MCNC: 0.77 MG/DL (ref 0.6–1.3)
DIAGNOSIS, 93000: NORMAL
DIFFERENTIAL METHOD BLD: ABNORMAL
EOSINOPHIL # BLD: 0.2 K/UL (ref 0–0.4)
EOSINOPHIL NFR BLD: 1 % (ref 0–5)
ERYTHROCYTE [DISTWIDTH] IN BLOOD BY AUTOMATED COUNT: 18.7 % (ref 11.6–14.5)
GLUCOSE SERPL-MCNC: 121 MG/DL (ref 74–99)
HCT VFR BLD AUTO: 38.6 % (ref 36–48)
HGB BLD-MCNC: 13.3 G/DL (ref 13–16)
IMM GRANULOCYTES # BLD AUTO: 0.3 K/UL (ref 0–0.04)
IMM GRANULOCYTES NFR BLD AUTO: 2 % (ref 0–0.5)
LYMPHOCYTES # BLD: 4.7 K/UL (ref 0.9–3.6)
LYMPHOCYTES NFR BLD: 27 % (ref 21–52)
MAGNESIUM SERPL-MCNC: 1.1 MG/DL (ref 1.6–2.6)
MCH RBC QN AUTO: 31.4 PG (ref 24–34)
MCHC RBC AUTO-ENTMCNC: 34.5 G/DL (ref 31–37)
MCV RBC AUTO: 91 FL (ref 78–100)
MONOCYTES # BLD: 1.4 K/UL (ref 0.05–1.2)
MONOCYTES NFR BLD: 8 % (ref 3–10)
NEUTS SEG # BLD: 10.6 K/UL (ref 1.8–8)
NEUTS SEG NFR BLD: 62 % (ref 40–73)
NRBC # BLD: 0.09 K/UL (ref 0–0.01)
NRBC BLD-RTO: 0.5 PER 100 WBC
P-R INTERVAL, ECG05: 140 MS
PLATELET # BLD AUTO: 393 K/UL (ref 135–420)
PMV BLD AUTO: 9.5 FL (ref 9.2–11.8)
POTASSIUM SERPL-SCNC: 4.2 MMOL/L (ref 3.5–5.5)
Q-T INTERVAL, ECG07: 346 MS
QRS DURATION, ECG06: 136 MS
QTC CALCULATION (BEZET), ECG08: 489 MS
RBC # BLD AUTO: 4.24 M/UL (ref 4.35–5.65)
SODIUM SERPL-SCNC: 141 MMOL/L (ref 136–145)
TROPONIN-HIGH SENSITIVITY: 10 NG/L (ref 0–78)
TROPONIN-HIGH SENSITIVITY: 9 NG/L (ref 0–78)
VENTRICULAR RATE, ECG03: 120 BPM
WBC # BLD AUTO: 17.1 K/UL (ref 4.6–13.2)

## 2022-12-27 PROCEDURE — 87040 BLOOD CULTURE FOR BACTERIA: CPT

## 2022-12-27 PROCEDURE — 80048 BASIC METABOLIC PNL TOTAL CA: CPT

## 2022-12-27 PROCEDURE — 94640 AIRWAY INHALATION TREATMENT: CPT

## 2022-12-27 PROCEDURE — 84484 ASSAY OF TROPONIN QUANT: CPT

## 2022-12-27 PROCEDURE — 71045 X-RAY EXAM CHEST 1 VIEW: CPT

## 2022-12-27 PROCEDURE — 83735 ASSAY OF MAGNESIUM: CPT

## 2022-12-27 PROCEDURE — 99285 EMERGENCY DEPT VISIT HI MDM: CPT

## 2022-12-27 PROCEDURE — 93005 ELECTROCARDIOGRAM TRACING: CPT

## 2022-12-27 PROCEDURE — 74011250637 HC RX REV CODE- 250/637: Performed by: EMERGENCY MEDICINE

## 2022-12-27 PROCEDURE — 85025 COMPLETE CBC W/AUTO DIFF WBC: CPT

## 2022-12-27 PROCEDURE — 74011000250 HC RX REV CODE- 250: Performed by: EMERGENCY MEDICINE

## 2022-12-27 RX ORDER — LANOLIN ALCOHOL/MO/W.PET/CERES
400 CREAM (GRAM) TOPICAL ONCE
Status: COMPLETED | OUTPATIENT
Start: 2022-12-27 | End: 2022-12-27

## 2022-12-27 RX ORDER — MOXIFLOXACIN HYDROCHLORIDE 400 MG/1
400 TABLET ORAL DAILY
Qty: 10 TABLET | Refills: 0 | Status: SHIPPED | OUTPATIENT
Start: 2022-12-27 | End: 2023-01-06

## 2022-12-27 RX ORDER — IPRATROPIUM BROMIDE AND ALBUTEROL SULFATE 2.5; .5 MG/3ML; MG/3ML
3 SOLUTION RESPIRATORY (INHALATION)
Status: COMPLETED | OUTPATIENT
Start: 2022-12-27 | End: 2022-12-27

## 2022-12-27 RX ORDER — MOXIFLOXACIN HYDROCHLORIDE 400 MG/1
400 TABLET ORAL
Status: COMPLETED | OUTPATIENT
Start: 2022-12-27 | End: 2022-12-27

## 2022-12-27 RX ADMIN — Medication 400 MG: at 17:48

## 2022-12-27 RX ADMIN — IPRATROPIUM BROMIDE AND ALBUTEROL SULFATE 3 ML: .5; 3 SOLUTION RESPIRATORY (INHALATION) at 16:36

## 2022-12-27 RX ADMIN — MOXIFLOXACIN 400 MG: 400 TABLET, FILM COATED ORAL at 20:42

## 2022-12-28 NOTE — ED NOTES
Patient revaluated after  lab result medicated as ordered, condition stable then discharge home . Instructions given.  Left in company of family member

## 2022-12-28 NOTE — ED PROVIDER NOTES
EMERGENCY DEPARTMENT HISTORY AND PHYSICAL EXAM    Date: 12/27/2022  Patient Name: Francois Meehan    History of Presenting Illness     Chief Complaint   Patient presents with    Chest Pain         History Provided By: Patient and Patient's Wife      Francois Meehan is a 65 y.o. male with PMHX of non-Hodgkin's lymphoma status post splenectomy and other conditions as noted below who presents to the emergency department C/O palpitations. He was seen at an urgent care for what he thought was possible pneumonia in his left chest his EKG caused them concern and he was sent to the emergency department by privately owned vehicle report called stated he was having an ST elevation MI. His EKG when compared to historical EKG showed no changes and there was no acute suggestion of STEMI. He stated he had had COVID and was a long-haul COVID carrier. He had been having some wheezing in the left chest and thought he might have a pneumonia. Patient is also diabetic. PCP: Red Waldrop MD    Current Facility-Administered Medications   Medication Dose Route Frequency Provider Last Rate Last Admin    moxifloxacin (AVELOX) tablet 400 mg  400 mg Oral NOW Elias Jacobo MD         Current Outpatient Medications   Medication Sig Dispense Refill    moxifloxacin (AVELOX) 400 mg tablet Take 1 Tablet by mouth daily for 10 days. 10 Tablet 0    albuterol (PROVENTIL HFA, VENTOLIN HFA, PROAIR HFA) 90 mcg/actuation inhaler Take 2 Puffs by inhalation every four (4) hours as needed for Wheezing. alendronate (FOSAMAX) 10 mg tablet Take 10 mg by mouth Daily (before breakfast). DULoxetine (CYMBALTA) 60 mg capsule Take 120 mg by mouth daily. metFORMIN ER (glucophage XR) 500 mg tablet Take 1,000 mg by mouth daily (with dinner). HYDROcodone-acetaminophen (NORCO) 5-325 mg per tablet Take 1 Tablet by mouth every six (6) hours as needed for Pain.      montelukast (Singulair) 10 mg tablet Take 10 mg by mouth daily. budesonide-formoteroL (Symbicort) 80-4.5 mcg/actuation HFAA Take 2 Puffs by inhalation two (2) times a day. tamsulosin (FLOMAX) 0.4 mg capsule Take 0.4 mg by mouth daily. testosterone cypionate (DEPOTESTOTERONE CYPIONATE) 200 mg/mL injection 100 mg by IntraMUSCular route every twenty-one (21) days. triamcinolone acetonide (KENALOG) 0.1 % ointment Apply  to affected area two (2) times a day. use thin layer      aspirin delayed-release 81 mg tablet Take  by mouth daily. losartan-hydroCHLOROthiazide (HYZAAR) 100-12.5 mg per tablet Take 1 Tab by mouth daily. Indications: hypertension      metoprolol succinate (TOPROL-XL) 25 mg XL tablet Take 50 mg by mouth daily. Indications: high blood pressure      rosuvastatin (CRESTOR) 40 mg tablet Take 20 mg by mouth daily. Indications: high cholesterol      MULTIVITAMINS WITH FLUORIDE (MULTI-VITAMIN PO) Take  by mouth daily. METHYLCELLULOSE (FIBER LAXATIVE PO) Take 5 Caps by mouth two (2) times a day. esomeprazole (NEXIUM) 20 mg capsule Take 40 mg by mouth daily. Indications: gastroesophageal reflux disease         Past History       Past Medical History:  Past Medical History:   Diagnosis Date    Acid reflux     Arthritis     Cancer ([address] 1983    non Hodgkin's     Diabetes ([address]     GERD (gastroesophageal reflux disease)     Hypercholesteremia     Hypertension 01/29/2018    BP spiked when administered anesthesia for scheduled surgery. on 1-    Ill-defined condition     menieres    Meniere disease, left     Non-Hodgkin lymphoma ([address]        Past Surgical History:  Past Surgical History:   Procedure Laterality Date    HX APPENDECTOMY      HX HERNIA REPAIR Bilateral     HX HIP ARTHROSCOPY      HX ORTHOPAEDIC      wrist pinning, neck fusion, bilateral rotorcuff, lumbar back surgery    HX TONSILLECTOMY      DE ABDOMEN SURGERY PROC UNLISTED      spleenectomy, laporotomy       Family History:  No family history on file.     Social History:  Social History     Tobacco Use    Smoking status: Former     Types: Cigarettes     Quit date: 1995     Years since quittin.8    Smokeless tobacco: Never   Vaping Use    Vaping Use: Never used   Substance Use Topics    Alcohol use: Yes     Alcohol/week: 7.0 standard drinks     Types: 7 Cans of beer per week    Drug use: No       Allergies:  No Known Allergies      Review of Systems   Review of Systems   HENT:  Positive for congestion and postnasal drip. Negative for trouble swallowing. Eyes: Negative. Respiratory:  Positive for cough and wheezing. Negative for apnea, choking, chest tightness and shortness of breath. Cardiovascular:  Positive for chest pain and palpitations. Negative for leg swelling. Gastrointestinal: Negative. Endocrine: Negative. Genitourinary: Negative. Musculoskeletal: Negative. Skin: Negative. Allergic/Immunologic: Negative. Neurological: Negative. Hematological: Negative. Psychiatric/Behavioral: Negative.            Physical Exam     Vitals:    22 1601 22 1647 22 1731 22 1817   BP: 114/85 106/77 126/79 126/78   Pulse: (!) 110 (!) 105 (!) 117 (!) 103   Resp:  14 24 26   Temp:       SpO2: 98% 95% 93% 96%     Physical Exam    Nursing notes and vital signs reviewed     Constitutional: Non toxic appearing, moderate distress  Head: Normocephalic, Atraumatic  Eyes: EOMI  Neck: Supple  Cardiovascular: Regular rate and rhythm, no murmurs, rubs, or gallops  Chest: Normal work of breathing and chest excursion bilaterally pain is worse with palpation of the left anterior chest  Lungs: Clear to ausculation bilaterally  Abdomen: Soft, non tender, non distended, abdominal scar consistent with history of splenectomy back: No evidence of trauma or deformity  Extremities: No evidence of trauma or deformity, no LE edema  Skin: Warm and dry, normal cap refill  Neuro: Alert and appropriate, CN intact, normal speech, strength and sensation full and symmetric bilaterally, normal gait, normal coordination  Psychiatric: Normal mood and affect      Diagnostic Study Results     Labs -     Recent Results (from the past 12 hour(s))   EKG, 12 LEAD, INITIAL    Collection Time: 12/27/22  3:44 PM   Result Value Ref Range    Ventricular Rate 120 BPM    Atrial Rate 120 BPM    P-R Interval 140 ms    QRS Duration 136 ms    Q-T Interval 346 ms    QTC Calculation (Bezet) 489 ms    Calculated R Axis -85 degrees    Calculated T Axis -167 degrees    Diagnosis       Sinus tachycardia  Left axis deviation  Nonspecific intraventricular block  Possible Lateral infarct , age undetermined  T wave abnormality, consider inferior ischemia  T wave abnormality, consider anterior ischemia  Abnormal ECG  When compared with ECG of 11-OCT-2021 07:35,  Nonspecific intraventricular block has replaced Right bundle branch block  Borderline criteria for Lateral infarct are now present     CBC WITH AUTOMATED DIFF    Collection Time: 12/27/22  4:30 PM   Result Value Ref Range    WBC 17.1 (H) 4.6 - 13.2 K/uL    RBC 4.24 (L) 4.35 - 5.65 M/uL    HGB 13.3 13.0 - 16.0 g/dL    HCT 38.6 36.0 - 48.0 %    MCV 91.0 78.0 - 100.0 FL    MCH 31.4 24.0 - 34.0 PG    MCHC 34.5 31.0 - 37.0 g/dL    RDW 18.7 (H) 11.6 - 14.5 %    PLATELET 890 582 - 065 K/uL    MPV 9.5 9.2 - 11.8 FL    NRBC 0.5 (H) 0  WBC    ABSOLUTE NRBC 0.09 (H) 0.00 - 0.01 K/uL    NEUTROPHILS 62 40 - 73 %    LYMPHOCYTES 27 21 - 52 %    MONOCYTES 8 3 - 10 %    EOSINOPHILS 1 0 - 5 %    BASOPHILS 0 0 - 2 %    IMMATURE GRANULOCYTES 2 (H) 0.0 - 0.5 %    ABS. NEUTROPHILS 10.6 (H) 1.8 - 8.0 K/UL    ABS. LYMPHOCYTES 4.7 (H) 0.9 - 3.6 K/UL    ABS. MONOCYTES 1.4 (H) 0.05 - 1.2 K/UL    ABS. EOSINOPHILS 0.2 0.0 - 0.4 K/UL    ABS. BASOPHILS 0.0 0.0 - 0.1 K/UL    ABS. IMM.  GRANS. 0.3 (H) 0.00 - 0.04 K/UL    DF AUTOMATED     TROPONIN-HIGH SENSITIVITY    Collection Time: 12/27/22  4:30 PM   Result Value Ref Range    Troponin-High Sensitivity 10 0 - 78 ng/L MAGNESIUM    Collection Time: 12/27/22  4:30 PM   Result Value Ref Range    Magnesium 1.1 (L) 1.6 - 2.6 mg/dL   METABOLIC PANEL, BASIC    Collection Time: 12/27/22  4:30 PM   Result Value Ref Range    Sodium 141 136 - 145 mmol/L    Potassium 4.2 3.5 - 5.5 mmol/L    Chloride 105 100 - 111 mmol/L    CO2 32 21 - 32 mmol/L    Anion gap 4 3.0 - 18 mmol/L    Glucose 121 (H) 74 - 99 mg/dL    BUN 21 (H) 7.0 - 18 MG/DL    Creatinine 0.77 0.6 - 1.3 MG/DL    BUN/Creatinine ratio 27 (H) 12 - 20      eGFR >60 >60 ml/min/1.73m2    Calcium 10.7 (H) 8.5 - 10.1 MG/DL   TROPONIN-HIGH SENSITIVITY    Collection Time: 12/27/22  6:20 PM   Result Value Ref Range    Troponin-High Sensitivity 9 0 - 78 ng/L       Radiologic Studies -     XR CHEST PORT   Final Result      1. No evidence of active cardiopulmonary disease. CT Results  (Last 48 hours)      None          CXR Results  (Last 48 hours)                 12/27/22 1713  XR CHEST PORT Final result    Impression:      1. No evidence of active cardiopulmonary disease. Narrative:  EXAM: Portable frontal view of the chest.       CLINICAL INDICATION/HISTORY: Abnormal EKG, chest pain       COMPARISON: 10/11/2021       _______________       FINDINGS:        Normal mediastinal and cardiac silhouettes with clear lungs with no pleural   effusion. Chronic healed left midclavicular fracture and prior lower cervical   ACDF.       _______________                   Medications given in the ED-  Medications   moxifloxacin (AVELOX) tablet 400 mg (has no administration in time range)   albuterol-ipratropium (DUO-NEB) 2.5 MG-0.5 MG/3 ML (3 mL Nebulization Given 12/27/22 1636)   magnesium oxide (MAG-OX) tablet 400 mg (400 mg Oral Given 12/27/22 1748)         Medical Decision Making   I am the first provider for this patient. I reviewed the vital signs, available nursing notes, past medical history, past surgical history, family history and social history.     Vital Signs-Reviewed the patient's vital signs. Pulse Oximetry Analysis - 96% on room air, not hypoxic     Cardiac Monitor:  Rate: 88 bpm  Rhythm: Normal sinus rhythm    EKG interpretation: (Preliminary)  EKG read by Dr. Zach Broderick. Concur with results as above and noted that there was no significant change with historical EKG no evidence of STEMI. Records Reviewed: Nursing Notes, Old Medical Records, and Previous electrocardiograms    Provider Notes (Medical Decision Making): Fabio Allen is a 65 y.o. male patient responded well to an albuterol nebulizer which resolved his wheezing and dissipated his chest pain. Erring on the side of caution due to to set troponin rule out both of which were negative. Due to his asplenic state and the previous wheezing and productive cough I did prophylactically cover him with moxifloxacin. Patient will follow-up with his primary care physician. Procedures:  Procedures    ED Course:   's condition improved during his time in the emergency department remainder as above. Diagnosis and Disposition     Critical Care:       DISCHARGE NOTE:    Kal Blount's  results have been reviewed with him. He has been counseled regarding his diagnosis, treatment, and plan. He verbally conveys understanding and agreement of the signs, symptoms, diagnosis, treatment and prognosis and additionally agrees to follow up as discussed. He also agrees with the care-plan and conveys that all of his questions have been answered. I have also provided discharge instructions for him that include: educational information regarding their diagnosis and treatment, and list of reasons why they would want to return to the ED prior to their follow-up appointment, should his condition change. He has been provided with education for proper emergency department utilization. CLINICAL IMPRESSION:    1. Chest pain on breathing    2. Wheezing    3. Asplenia after surgical procedure        PLAN:  1. D/C Home  2. Current Discharge Medication List        START taking these medications    Details   moxifloxacin (AVELOX) 400 mg tablet Take 1 Tablet by mouth daily for 10 days. Qty: 10 Tablet, Refills: 0  Start date: 12/27/2022, End date: 1/6/2023           3. Follow-up Information       Follow up With Specialties Details Why Contact Info    Lauren Aguila MD [facility] Schedule an appointment as soon as possible for a visit in 1 week For follow-up [address] EMERGENCY DEPT Emergency Medicine  As needed, If symptoms worsen [address] [id number]  [phone]          _______________________________      Please note that this dictation was completed with [employer], the computer voice recognition software. Quite often unanticipated grammatical, syntax, homophones, and other interpretive errors are inadvertently transcribed by the computer software. Please disregard these errors. Please excuse any errors that have escaped final proofreading.

## 2023-01-01 LAB
BACTERIA SPEC CULT: NORMAL
SERVICE CMNT-IMP: NORMAL

## 2023-04-25 ENCOUNTER — APPOINTMENT (OUTPATIENT)
Facility: HOSPITAL | Age: 63
End: 2023-04-25
Payer: COMMERCIAL

## 2023-04-25 ENCOUNTER — HOSPITAL ENCOUNTER (EMERGENCY)
Facility: HOSPITAL | Age: 63
Discharge: HOME | End: 2023-04-25
Attending: EMERGENCY MEDICINE
Payer: COMMERCIAL

## 2023-04-25 VITALS
WEIGHT: 198 LBS | HEART RATE: 92 BPM | RESPIRATION RATE: 16 BRPM | OXYGEN SATURATION: 100 % | DIASTOLIC BLOOD PRESSURE: 102 MMHG | BODY MASS INDEX: 30.01 KG/M2 | HEIGHT: 68 IN | TEMPERATURE: 97.3 F | SYSTOLIC BLOOD PRESSURE: 185 MMHG

## 2023-04-25 DIAGNOSIS — S02.31XA CLOSED FRACTURE OF RIGHT ORBITAL FLOOR, INITIAL ENCOUNTER (HCC): Primary | ICD-10-CM

## 2023-04-25 DIAGNOSIS — T79.7XXA SUBCUTANEOUS EMPHYSEMA, INITIAL ENCOUNTER (HCC): ICD-10-CM

## 2023-04-25 LAB
ALBUMIN SERPL-MCNC: 4.1 G/DL (ref 3.4–5)
ALBUMIN/GLOB SERPL: 1 (ref 0.8–1.7)
ALP SERPL-CCNC: 71 U/L (ref 45–117)
ALT SERPL-CCNC: 92 U/L (ref 16–61)
ANION GAP SERPL CALC-SCNC: 4 MMOL/L (ref 3–18)
AST SERPL-CCNC: 49 U/L (ref 10–38)
BASOPHILS # BLD: 0 K/UL (ref 0–0.1)
BASOPHILS NFR BLD: 0 % (ref 0–2)
BILIRUB SERPL-MCNC: 0.3 MG/DL (ref 0.2–1)
BUN SERPL-MCNC: 15 MG/DL (ref 7–18)
BUN/CREAT SERPL: 19 (ref 12–20)
CALCIUM SERPL-MCNC: 10.8 MG/DL (ref 8.5–10.1)
CHLORIDE SERPL-SCNC: 106 MMOL/L (ref 100–111)
CO2 SERPL-SCNC: 29 MMOL/L (ref 21–32)
CREAT SERPL-MCNC: 0.79 MG/DL (ref 0.6–1.3)
DIFFERENTIAL METHOD BLD: ABNORMAL
EOSINOPHIL # BLD: 0.2 K/UL (ref 0–0.4)
EOSINOPHIL NFR BLD: 1 % (ref 0–5)
ERYTHROCYTE [DISTWIDTH] IN BLOOD BY AUTOMATED COUNT: 15.4 % (ref 11.6–14.5)
GLOBULIN SER CALC-MCNC: 4 G/DL (ref 2–4)
GLUCOSE SERPL-MCNC: 85 MG/DL (ref 74–99)
HCT VFR BLD AUTO: 45.4 % (ref 36–48)
HGB BLD-MCNC: 15.4 G/DL (ref 13–16)
IMM GRANULOCYTES # BLD AUTO: 0 K/UL
IMM GRANULOCYTES NFR BLD AUTO: 0 %
LYMPHOCYTES # BLD: 3.5 K/UL (ref 0.9–3.6)
LYMPHOCYTES NFR BLD: 23 % (ref 21–52)
MCH RBC QN AUTO: 32.5 PG (ref 24–34)
MCHC RBC AUTO-ENTMCNC: 33.9 G/DL (ref 31–37)
MCV RBC AUTO: 95.8 FL (ref 78–100)
MONOCYTES # BLD: 1.8 K/UL (ref 0.05–1.2)
MONOCYTES NFR BLD: 12 % (ref 3–10)
NEUTS SEG # BLD: 9.7 K/UL (ref 1.8–8)
NEUTS SEG NFR BLD: 64 % (ref 40–73)
NRBC # BLD: 0.02 K/UL (ref 0–0.01)
NRBC BLD-RTO: 0.1 PER 100 WBC
PLATELET # BLD AUTO: 499 K/UL (ref 135–420)
PLATELET COMMENT: ABNORMAL
PMV BLD AUTO: 9.5 FL (ref 9.2–11.8)
POTASSIUM SERPL-SCNC: 4.8 MMOL/L (ref 3.5–5.5)
PROT SERPL-MCNC: 8.1 G/DL (ref 6.4–8.2)
RBC # BLD AUTO: 4.74 M/UL (ref 4.35–5.65)
RBC MORPH BLD: ABNORMAL
SODIUM SERPL-SCNC: 139 MMOL/L (ref 136–145)
TROPONIN I SERPL HS-MCNC: 9 NG/L (ref 0–78)
WBC # BLD AUTO: 15.2 K/UL (ref 4.6–13.2)
WBC MORPH BLD: ABNORMAL

## 2023-04-25 PROCEDURE — 6370000000 HC RX 637 (ALT 250 FOR IP): Performed by: PHYSICIAN ASSISTANT

## 2023-04-25 PROCEDURE — 80053 COMPREHEN METABOLIC PANEL: CPT

## 2023-04-25 PROCEDURE — 70482 CT ORBIT/EAR/FOSSA W/O&W/DYE: CPT

## 2023-04-25 PROCEDURE — 6360000004 HC RX CONTRAST MEDICATION: Performed by: PHYSICIAN ASSISTANT

## 2023-04-25 PROCEDURE — 85025 COMPLETE CBC W/AUTO DIFF WBC: CPT

## 2023-04-25 PROCEDURE — 84484 ASSAY OF TROPONIN QUANT: CPT

## 2023-04-25 PROCEDURE — 99285 EMERGENCY DEPT VISIT HI MDM: CPT

## 2023-04-25 PROCEDURE — 93005 ELECTROCARDIOGRAM TRACING: CPT | Performed by: PHYSICIAN ASSISTANT

## 2023-04-25 PROCEDURE — 2500000003 HC RX 250 WO HCPCS: Performed by: PHYSICIAN ASSISTANT

## 2023-04-25 PROCEDURE — 71045 X-RAY EXAM CHEST 1 VIEW: CPT

## 2023-04-25 RX ORDER — LOSARTAN POTASSIUM 50 MG/1
100 TABLET ORAL
Status: COMPLETED | OUTPATIENT
Start: 2023-04-25 | End: 2023-04-25

## 2023-04-25 RX ORDER — HYDROCHLOROTHIAZIDE 25 MG/1
12.5 TABLET ORAL
Status: COMPLETED | OUTPATIENT
Start: 2023-04-25 | End: 2023-04-25

## 2023-04-25 RX ORDER — PROPARACAINE HYDROCHLORIDE 5 MG/ML
1 SOLUTION/ DROPS OPHTHALMIC
Status: COMPLETED | OUTPATIENT
Start: 2023-04-25 | End: 2023-04-25

## 2023-04-25 RX ORDER — OXYCODONE HYDROCHLORIDE AND ACETAMINOPHEN 5; 325 MG/1; MG/1
1 TABLET ORAL
Status: COMPLETED | OUTPATIENT
Start: 2023-04-25 | End: 2023-04-25

## 2023-04-25 RX ORDER — LOSARTAN POTASSIUM AND HYDROCHLOROTHIAZIDE 12.5; 1 MG/1; MG/1
1 TABLET ORAL DAILY
Qty: 30 TABLET | Refills: 3 | Status: SHIPPED | OUTPATIENT
Start: 2023-04-25

## 2023-04-25 RX ORDER — AMOXICILLIN AND CLAVULANATE POTASSIUM 875; 125 MG/1; MG/1
1 TABLET, FILM COATED ORAL
Status: COMPLETED | OUTPATIENT
Start: 2023-04-25 | End: 2023-04-25

## 2023-04-25 RX ORDER — AMOXICILLIN AND CLAVULANATE POTASSIUM 875; 125 MG/1; MG/1
1 TABLET, FILM COATED ORAL 2 TIMES DAILY
Qty: 20 TABLET | Refills: 0 | Status: SHIPPED | OUTPATIENT
Start: 2023-04-25 | End: 2023-05-05

## 2023-04-25 RX ORDER — OXYCODONE HYDROCHLORIDE AND ACETAMINOPHEN 5; 325 MG/1; MG/1
1 TABLET ORAL EVERY 6 HOURS PRN
Qty: 12 TABLET | Refills: 0 | Status: SHIPPED | OUTPATIENT
Start: 2023-04-25 | End: 2023-04-28

## 2023-04-25 RX ORDER — METOPROLOL SUCCINATE 25 MG/1
12.5 TABLET, EXTENDED RELEASE ORAL DAILY
Qty: 30 TABLET | Refills: 0 | Status: SHIPPED | OUTPATIENT
Start: 2023-04-25

## 2023-04-25 RX ORDER — ONDANSETRON 4 MG/1
4 TABLET, ORALLY DISINTEGRATING ORAL
Status: COMPLETED | OUTPATIENT
Start: 2023-04-25 | End: 2023-04-25

## 2023-04-25 RX ADMIN — HYDROCHLOROTHIAZIDE 12.5 MG: 25 TABLET ORAL at 14:39

## 2023-04-25 RX ADMIN — METOPROLOL TARTRATE 25 MG: 25 TABLET, FILM COATED ORAL at 14:39

## 2023-04-25 RX ADMIN — OXYCODONE AND ACETAMINOPHEN 1 TABLET: 5; 325 TABLET ORAL at 14:39

## 2023-04-25 RX ADMIN — AMOXICILLIN AND CLAVULANATE POTASSIUM 1 TABLET: 875; 125 TABLET, FILM COATED ORAL at 18:10

## 2023-04-25 RX ADMIN — ONDANSETRON 4 MG: 4 TABLET, ORALLY DISINTEGRATING ORAL at 14:39

## 2023-04-25 RX ADMIN — FLUORESCEIN SODIUM 1 EACH: 0.6 STRIP OPHTHALMIC at 14:39

## 2023-04-25 RX ADMIN — IOPAMIDOL 100 ML: 612 INJECTION, SOLUTION INTRAVENOUS at 16:37

## 2023-04-25 RX ADMIN — PROPARACAINE HYDROCHLORIDE 1 DROP: 5 SOLUTION/ DROPS OPHTHALMIC at 14:39

## 2023-04-25 RX ADMIN — LOSARTAN POTASSIUM 100 MG: 50 TABLET, FILM COATED ORAL at 14:39

## 2023-04-25 ASSESSMENT — PAIN DESCRIPTION - ORIENTATION: ORIENTATION: RIGHT

## 2023-04-25 ASSESSMENT — VISUAL ACUITY
OU: 20/25
OD: 20/50
OS: 20/50

## 2023-04-25 ASSESSMENT — PAIN SCALES - GENERAL: PAINLEVEL_OUTOF10: 8

## 2023-04-25 ASSESSMENT — PAIN DESCRIPTION - LOCATION: LOCATION: EYE

## 2023-04-25 ASSESSMENT — PAIN - FUNCTIONAL ASSESSMENT: PAIN_FUNCTIONAL_ASSESSMENT: 0-10

## 2023-04-25 NOTE — ED PROVIDER NOTES
oxyCODONE-acetaminophen (PERCOCET) 5-325 MG per tablet 1 tablet (1 tablet Oral Given 4/25/23 1439)   ondansetron (ZOFRAN-ODT) disintegrating tablet 4 mg (4 mg Oral Given 4/25/23 1439)   metoprolol tartrate (LOPRESSOR) tablet 25 mg (25 mg Oral Given 4/25/23 1439)   hydroCHLOROthiazide (HYDRODIURIL) tablet 12.5 mg (12.5 mg Oral Given 4/25/23 1439)   losartan (COZAAR) tablet 100 mg (100 mg Oral Given 4/25/23 1439)   iopamidol (ISOVUE-300) 61 % injection 100 mL (100 mLs IntraVENous Given 4/25/23 1637)   amoxicillin-clavulanate (AUGMENTIN) 875-125 MG per tablet 1 tablet (1 tablet Oral Given 4/25/23 1810)       ED Course as of 04/28/23 0817   Tue Apr 25, 2023   1720 Case discussed with Dr. Marylee Kerns, ENT, and is having patient leave CPAP off for at least 5 days starting on Augmentin. As long as there is no significant deformity or displacement of fracture and follow-up as an outpatient [HN]      ED Course User Index  [HN] SUNI Kumar       Medical Decision Making     CC/HPI Summary, DDx, ED Course, and Reassessment: pain Improved after proparacaine drops  Case discussed with ED attending see face-to-face note  Patient presents with right-sided facial pain and upper and lower eyelid swelling with some crepitus palpated. No fluorescein uptake no dendritic lesions EOMs intact in all directions pupils round equal reactive to light, no septal hematoma no injury that patient reports. Found to have orbital floor fracture and uses CPAP overnight likely caused air to push into subcu space around eyes. No evidence of muscle or nerve entrapment. No fat protruding through, fracture does not appear displaced or dislocated. Will cover with Augmentin have patient avoid blowing nose and using CPAP at night.   Will have patient follow-up with ENT        Disposition Considerations (tests considered but not done, Admit vs D/C, Shared Decision Making, Pt Expectation of Test or Tx.):        Diagnosis and Disposition

## 2023-04-25 NOTE — ED NOTES
Pt arrived with c/o swollen and red right eye. Pt was normal when he went to bed everything is fine but woke up this morning and it was painful and red. Pt is able to see out the eye but it is blurry. Pt is in NAD.  Pt has ran out of daily BP meds and was hypertensive on arrival.        Dariana Orourke RN  04/25/23 [id number]

## 2023-04-25 NOTE — DISCHARGE INSTRUCTIONS
Do not use CPAP for at least 5 days  Take your blood pressure medications as directed  Take Augmentin as directed  Do Not blow your nose

## 2023-04-26 LAB
EKG ATRIAL RATE: 87 BPM
EKG DIAGNOSIS: NORMAL
EKG P AXIS: 31 DEGREES
EKG P-R INTERVAL: 144 MS
EKG Q-T INTERVAL: 380 MS
EKG QRS DURATION: 142 MS
EKG QTC CALCULATION (BAZETT): 457 MS
EKG R AXIS: -74 DEGREES
EKG T AXIS: 1 DEGREES
EKG VENTRICULAR RATE: 87 BPM

## 2024-03-19 NOTE — ROUTINE PROCESS
0765 assumed care of pt after bedside verbal report was given by off going nurse, pt sitting up on the side of the bed eating breakfast, no acute distress noted, will monitor
1709 Bedside and Verbal shift change report given to Minna Mclaughlin RN (oncoming nurse) by ROGELIO Carolina (offgoing nurse). Report included the following information SBAR, Kardex and MAR.
Bedside and Verbal shift change report given to Jeff Rose RN (oncoming nurse) by Kasey Delgado RN   (offgoing nurse). Report included the following information SBAR, Kardex, MAR and Recent Results.
Bedside and Verbal shift change report given to K. Herbert Lesch (oncoming nurse) by Chaitanya Orourke RN   (offgoing nurse). Report included the following information SBAR, Kardex, Intake/Output, MAR and Recent Results.
Bedside and Verbal shift change report given to Sam Fernandez RN (oncoming nurse) by Velasquez Balderas RN  (offgoing nurse). Report included the following information SBAR, Kardex, Intake/Output and MAR.
Bedside and Verbal shift change report given to Staci Palacios (oncoming nurse) by Amanda Iglesias RN   (offgoing nurse). Report included the following information SBAR, Kardex, Intake/Output and MAR.
Infectious Disease
Infectious Disease
Internal Medicine
Neurology
Shift summary:   1740: Pt transferred to unit via tech. Performed dual skin assessment no abnormalities. Will continue to monitor pt. Bedside and Verbal shift change report given to Katrina Clay RN (oncoming nurse) by Elli Rousseau RN (offgoing nurse). Report included the following information SBAR, Kardex, ED Summary, Procedure Summary, Intake/Output, MAR, Accordion, Recent Results and Med Rec Status.
Shift summary:  0840: Pt states that he doesn't take gabapentin anymore. Refused and documented accordingly. Bedside and Verbal shift change report given to Gris Ocampo RN (oncoming nurse) by Tom Hogan RN (offgoing nurse). Report included the following information SBAR, Kardex, ED Summary, Procedure Summary, Intake/Output, MAR, Accordion and Recent Results.
TRANSFER - IN REPORT:    Verbal report received from Michael Cesar RN(name) on Cecilia Sims  being received from ED(unit) for routine progression of care      Report consisted of patients Situation, Background, Assessment and   Recommendations(SBAR). Information from the following report(s) SBAR, Kardex, ED Summary, Procedure Summary, Intake/Output, MAR, Accordion, Recent Results and Med Rec Status was reviewed with the receiving nurse. Opportunity for questions and clarification was provided. Assessment completed upon patients arrival to unit and care assumed.
TRANSFER - OUT REPORT:    Verbal report given to [facility] RN(name) on Gayathri Murphy  being transferred to medical(unit) for routine progression of care       Report consisted of patients Situation, Background, Assessment and   Recommendations(SBAR). Information from the following report(s) SBAR, ED Summary and MAR was reviewed with the receiving nurse. Lines:   Peripheral IV 05/28/18 Right Antecubital (Active)   Dressing Status Clean, dry, & intact 5/28/2018  2:10 PM   Dressing Type Transparent 5/28/2018  2:10 PM   Hub Color/Line Status Pink;Flushed 5/28/2018  2:10 PM   Action Taken Blood drawn 5/28/2018  2:10 PM       Peripheral IV 05/28/18 Left Hand (Active)   Dressing Status Clean, dry, & intact 5/28/2018  4:47 PM   Dressing Type Transparent 5/28/2018  4:47 PM   Hub Color/Line Status Flushed 5/28/2018  4:47 PM        Opportunity for questions and clarification was provided.       Patient transported with:   [employer]
Infectious Disease
Internal Medicine
Neurology
Neurology
Urology
Internal Medicine
Infectious Disease
Infectious Disease
Neurology
Infectious Disease
Pulmonology
Internal Medicine
Pulmonology
Pulmonology
Internal Medicine

## 2024-08-20 ENCOUNTER — APPOINTMENT (OUTPATIENT)
Facility: HOSPITAL | Age: 64
End: 2024-08-20
Payer: COMMERCIAL

## 2024-08-20 ENCOUNTER — HOSPITAL ENCOUNTER (INPATIENT)
Facility: HOSPITAL | Age: 64
LOS: 3 days | Discharge: HOME | End: 2024-08-23
Attending: STUDENT IN AN ORGANIZED HEALTH CARE EDUCATION/TRAINING PROGRAM | Admitting: HOSPITALIST
Payer: COMMERCIAL

## 2024-08-20 DIAGNOSIS — L03.115 CELLULITIS OF RIGHT LOWER EXTREMITY: Primary | ICD-10-CM

## 2024-08-20 DIAGNOSIS — N17.9 AKI (ACUTE KIDNEY INJURY) (HCC): ICD-10-CM

## 2024-08-20 DIAGNOSIS — A41.9 SEPSIS, DUE TO UNSPECIFIED ORGANISM, UNSPECIFIED WHETHER ACUTE ORGAN DYSFUNCTION PRESENT (HCC): ICD-10-CM

## 2024-08-20 DIAGNOSIS — L03.119 CELLULITIS OF LOWER LEG: ICD-10-CM

## 2024-08-20 PROBLEM — R19.7 DIARRHEA: Status: ACTIVE | Noted: 2024-08-20

## 2024-08-20 LAB
ALBUMIN SERPL-MCNC: 4.1 G/DL (ref 3.4–5)
ALBUMIN/GLOB SERPL: 1.1 (ref 0.8–1.7)
ALP SERPL-CCNC: 64 U/L (ref 45–117)
ALT SERPL-CCNC: 70 U/L (ref 16–61)
ANION GAP SERPL CALC-SCNC: 5 MMOL/L (ref 3–18)
APPEARANCE UR: CLEAR
AST SERPL-CCNC: 43 U/L (ref 10–38)
BACTERIA URNS QL MICRO: ABNORMAL /HPF
BASOPHILS # BLD: 0 K/UL (ref 0–0.1)
BASOPHILS NFR BLD: 0 % (ref 0–2)
BILIRUB SERPL-MCNC: 0.6 MG/DL (ref 0.2–1)
BILIRUB UR QL: NEGATIVE
BUN SERPL-MCNC: 46 MG/DL (ref 7–18)
BUN/CREAT SERPL: 30 (ref 12–20)
CALCIUM SERPL-MCNC: 10.5 MG/DL (ref 8.5–10.1)
CHLORIDE SERPL-SCNC: 104 MMOL/L (ref 100–111)
CO2 SERPL-SCNC: 27 MMOL/L (ref 21–32)
COLOR UR: YELLOW
CREAT SERPL-MCNC: 1.54 MG/DL (ref 0.6–1.3)
DIFFERENTIAL METHOD BLD: ABNORMAL
EOSINOPHIL # BLD: 0.3 K/UL (ref 0–0.4)
EOSINOPHIL NFR BLD: 1 % (ref 0–5)
EPITH CASTS URNS QL MICRO: ABNORMAL /LPF (ref 0–5)
ERYTHROCYTE [DISTWIDTH] IN BLOOD BY AUTOMATED COUNT: 14.9 % (ref 11.6–14.5)
GLOBULIN SER CALC-MCNC: 3.8 G/DL (ref 2–4)
GLUCOSE BLD STRIP.AUTO-MCNC: 95 MG/DL (ref 70–110)
GLUCOSE SERPL-MCNC: 123 MG/DL (ref 74–99)
GLUCOSE UR STRIP.AUTO-MCNC: NEGATIVE MG/DL
HCT VFR BLD AUTO: 46.7 % (ref 36–48)
HGB BLD-MCNC: 15.8 G/DL (ref 13–16)
HGB UR QL STRIP: NEGATIVE
HYALINE CASTS URNS QL MICRO: ABNORMAL /LPF (ref 0–2)
IMM GRANULOCYTES # BLD AUTO: 0.3 K/UL (ref 0–0.04)
IMM GRANULOCYTES NFR BLD AUTO: 1 % (ref 0–0.5)
KETONES UR QL STRIP.AUTO: ABNORMAL MG/DL
LACTATE BLD-SCNC: 1.41 MMOL/L (ref 0.4–2)
LEUKOCYTE ESTERASE UR QL STRIP.AUTO: NEGATIVE
LIPASE SERPL-CCNC: 85 U/L (ref 13–75)
LYMPHOCYTES # BLD: 1.2 K/UL (ref 0.9–3.6)
LYMPHOCYTES NFR BLD: 4 % (ref 21–52)
MCH RBC QN AUTO: 32.8 PG (ref 24–34)
MCHC RBC AUTO-ENTMCNC: 33.8 G/DL (ref 31–37)
MCV RBC AUTO: 97.1 FL (ref 78–100)
MONOCYTES # BLD: 2.3 K/UL (ref 0.05–1.2)
MONOCYTES NFR BLD: 8 % (ref 3–10)
NEUTS SEG # BLD: 24.9 K/UL (ref 1.8–8)
NEUTS SEG NFR BLD: 86 % (ref 40–73)
NITRITE UR QL STRIP.AUTO: NEGATIVE
NRBC # BLD: 0.02 K/UL (ref 0–0.01)
NRBC BLD-RTO: 0.1 PER 100 WBC
PH UR STRIP: 5 (ref 5–8)
PLATELET # BLD AUTO: 568 K/UL (ref 135–420)
PLATELET COMMENT: ABNORMAL
PMV BLD AUTO: 9 FL (ref 9.2–11.8)
POTASSIUM SERPL-SCNC: 4.6 MMOL/L (ref 3.5–5.5)
PROT SERPL-MCNC: 7.9 G/DL (ref 6.4–8.2)
PROT UR STRIP-MCNC: 30 MG/DL
RBC # BLD AUTO: 4.81 M/UL (ref 4.35–5.65)
RBC #/AREA URNS HPF: NEGATIVE /HPF (ref 0–5)
RBC MORPH BLD: ABNORMAL
SODIUM SERPL-SCNC: 136 MMOL/L (ref 136–145)
SP GR UR REFRACTOMETRY: 1.02 (ref 1–1.03)
TROPONIN I SERPL HS-MCNC: 6 NG/L (ref 0–78)
UROBILINOGEN UR QL STRIP.AUTO: 0.2 EU/DL (ref 0.2–1)
WBC # BLD AUTO: 29 K/UL (ref 4.6–13.2)
WBC URNS QL MICRO: NEGATIVE /HPF (ref 0–5)

## 2024-08-20 PROCEDURE — 2580000003 HC RX 258: Performed by: STUDENT IN AN ORGANIZED HEALTH CARE EDUCATION/TRAINING PROGRAM

## 2024-08-20 PROCEDURE — 83690 ASSAY OF LIPASE: CPT

## 2024-08-20 PROCEDURE — 87040 BLOOD CULTURE FOR BACTERIA: CPT

## 2024-08-20 PROCEDURE — 81001 URINALYSIS AUTO W/SCOPE: CPT

## 2024-08-20 PROCEDURE — 83036 HEMOGLOBIN GLYCOSYLATED A1C: CPT

## 2024-08-20 PROCEDURE — 6360000004 HC RX CONTRAST MEDICATION: Performed by: STUDENT IN AN ORGANIZED HEALTH CARE EDUCATION/TRAINING PROGRAM

## 2024-08-20 PROCEDURE — 6360000002 HC RX W HCPCS: Performed by: EMERGENCY MEDICINE

## 2024-08-20 PROCEDURE — 96361 HYDRATE IV INFUSION ADD-ON: CPT

## 2024-08-20 PROCEDURE — 83605 ASSAY OF LACTIC ACID: CPT

## 2024-08-20 PROCEDURE — 82962 GLUCOSE BLOOD TEST: CPT

## 2024-08-20 PROCEDURE — 93005 ELECTROCARDIOGRAM TRACING: CPT | Performed by: EMERGENCY MEDICINE

## 2024-08-20 PROCEDURE — 2580000003 HC RX 258: Performed by: HOSPITALIST

## 2024-08-20 PROCEDURE — 71045 X-RAY EXAM CHEST 1 VIEW: CPT

## 2024-08-20 PROCEDURE — 96365 THER/PROPH/DIAG IV INF INIT: CPT

## 2024-08-20 PROCEDURE — 73701 CT LOWER EXTREMITY W/DYE: CPT

## 2024-08-20 PROCEDURE — 6360000002 HC RX W HCPCS: Performed by: STUDENT IN AN ORGANIZED HEALTH CARE EDUCATION/TRAINING PROGRAM

## 2024-08-20 PROCEDURE — 99285 EMERGENCY DEPT VISIT HI MDM: CPT

## 2024-08-20 PROCEDURE — 96375 TX/PRO/DX INJ NEW DRUG ADDON: CPT

## 2024-08-20 PROCEDURE — 74177 CT ABD & PELVIS W/CONTRAST: CPT

## 2024-08-20 PROCEDURE — 87154 CUL TYP ID BLD PTHGN 6+ TRGT: CPT

## 2024-08-20 PROCEDURE — 1100000003 HC PRIVATE W/ TELEMETRY

## 2024-08-20 PROCEDURE — 87636 SARSCOV2 & INF A&B AMP PRB: CPT

## 2024-08-20 PROCEDURE — 84484 ASSAY OF TROPONIN QUANT: CPT

## 2024-08-20 PROCEDURE — 85025 COMPLETE CBC W/AUTO DIFF WBC: CPT

## 2024-08-20 PROCEDURE — 80053 COMPREHEN METABOLIC PANEL: CPT

## 2024-08-20 PROCEDURE — 6360000002 HC RX W HCPCS: Performed by: HOSPITALIST

## 2024-08-20 PROCEDURE — 96376 TX/PRO/DX INJ SAME DRUG ADON: CPT

## 2024-08-20 RX ORDER — MORPHINE SULFATE 2 MG/ML
2 INJECTION, SOLUTION INTRAMUSCULAR; INTRAVENOUS EVERY 4 HOURS PRN
Status: DISCONTINUED | OUTPATIENT
Start: 2024-08-20 | End: 2024-08-23 | Stop reason: HOSPADM

## 2024-08-20 RX ORDER — ONDANSETRON 2 MG/ML
4 INJECTION INTRAMUSCULAR; INTRAVENOUS EVERY 6 HOURS PRN
Status: DISCONTINUED | OUTPATIENT
Start: 2024-08-20 | End: 2024-08-23 | Stop reason: HOSPADM

## 2024-08-20 RX ORDER — ASPIRIN 81 MG/1
81 TABLET ORAL DAILY
Status: DISCONTINUED | OUTPATIENT
Start: 2024-08-21 | End: 2024-08-23 | Stop reason: HOSPADM

## 2024-08-20 RX ORDER — SODIUM CHLORIDE 9 MG/ML
INJECTION, SOLUTION INTRAVENOUS PRN
Status: DISCONTINUED | OUTPATIENT
Start: 2024-08-20 | End: 2024-08-23 | Stop reason: HOSPADM

## 2024-08-20 RX ORDER — GLUCAGON 1 MG/ML
1 KIT INJECTION PRN
Status: DISCONTINUED | OUTPATIENT
Start: 2024-08-20 | End: 2024-08-23 | Stop reason: HOSPADM

## 2024-08-20 RX ORDER — IOPAMIDOL 612 MG/ML
100 INJECTION, SOLUTION INTRAVASCULAR
Status: COMPLETED | OUTPATIENT
Start: 2024-08-20 | End: 2024-08-20

## 2024-08-20 RX ORDER — INSULIN LISPRO 100 [IU]/ML
0-4 INJECTION, SOLUTION INTRAVENOUS; SUBCUTANEOUS NIGHTLY
Status: DISCONTINUED | OUTPATIENT
Start: 2024-08-20 | End: 2024-08-23 | Stop reason: HOSPADM

## 2024-08-20 RX ORDER — SODIUM CHLORIDE 9 MG/ML
INJECTION, SOLUTION INTRAVENOUS CONTINUOUS
Status: DISPENSED | OUTPATIENT
Start: 2024-08-20 | End: 2024-08-21

## 2024-08-20 RX ORDER — METOPROLOL SUCCINATE 25 MG/1
12.5 TABLET, EXTENDED RELEASE ORAL DAILY
Status: DISCONTINUED | OUTPATIENT
Start: 2024-08-21 | End: 2024-08-23 | Stop reason: HOSPADM

## 2024-08-20 RX ORDER — SODIUM CHLORIDE 0.9 % (FLUSH) 0.9 %
5-40 SYRINGE (ML) INJECTION EVERY 12 HOURS SCHEDULED
Status: DISCONTINUED | OUTPATIENT
Start: 2024-08-20 | End: 2024-08-23 | Stop reason: HOSPADM

## 2024-08-20 RX ORDER — 0.9 % SODIUM CHLORIDE 0.9 %
1500 INTRAVENOUS SOLUTION INTRAVENOUS ONCE
Status: COMPLETED | OUTPATIENT
Start: 2024-08-20 | End: 2024-08-20

## 2024-08-20 RX ORDER — SODIUM CHLORIDE, SODIUM LACTATE, POTASSIUM CHLORIDE, AND CALCIUM CHLORIDE .6; .31; .03; .02 G/100ML; G/100ML; G/100ML; G/100ML
1000 INJECTION, SOLUTION INTRAVENOUS ONCE
Status: COMPLETED | OUTPATIENT
Start: 2024-08-20 | End: 2024-08-20

## 2024-08-20 RX ORDER — TAMSULOSIN HYDROCHLORIDE 0.4 MG/1
0.4 CAPSULE ORAL DAILY
Status: DISCONTINUED | OUTPATIENT
Start: 2024-08-21 | End: 2024-08-23 | Stop reason: HOSPADM

## 2024-08-20 RX ORDER — ONDANSETRON 2 MG/ML
4 INJECTION INTRAMUSCULAR; INTRAVENOUS
Status: COMPLETED | OUTPATIENT
Start: 2024-08-20 | End: 2024-08-20

## 2024-08-20 RX ORDER — ONDANSETRON 2 MG/ML
4 INJECTION INTRAMUSCULAR; INTRAVENOUS ONCE
Status: COMPLETED | OUTPATIENT
Start: 2024-08-20 | End: 2024-08-20

## 2024-08-20 RX ORDER — ENOXAPARIN SODIUM 100 MG/ML
40 INJECTION SUBCUTANEOUS DAILY
Status: DISCONTINUED | OUTPATIENT
Start: 2024-08-20 | End: 2024-08-23 | Stop reason: HOSPADM

## 2024-08-20 RX ORDER — POLYETHYLENE GLYCOL 3350 17 G/17G
17 POWDER, FOR SOLUTION ORAL DAILY PRN
Status: DISCONTINUED | OUTPATIENT
Start: 2024-08-20 | End: 2024-08-23 | Stop reason: HOSPADM

## 2024-08-20 RX ORDER — ACETAMINOPHEN 650 MG/1
650 SUPPOSITORY RECTAL EVERY 6 HOURS PRN
Status: DISCONTINUED | OUTPATIENT
Start: 2024-08-20 | End: 2024-08-23 | Stop reason: HOSPADM

## 2024-08-20 RX ORDER — DEXTROSE MONOHYDRATE 100 MG/ML
INJECTION, SOLUTION INTRAVENOUS CONTINUOUS PRN
Status: DISCONTINUED | OUTPATIENT
Start: 2024-08-20 | End: 2024-08-23 | Stop reason: HOSPADM

## 2024-08-20 RX ORDER — SODIUM CHLORIDE 0.9 % (FLUSH) 0.9 %
5-40 SYRINGE (ML) INJECTION PRN
Status: DISCONTINUED | OUTPATIENT
Start: 2024-08-20 | End: 2024-08-23 | Stop reason: HOSPADM

## 2024-08-20 RX ORDER — MORPHINE SULFATE 4 MG/ML
4 INJECTION, SOLUTION INTRAMUSCULAR; INTRAVENOUS
Status: COMPLETED | OUTPATIENT
Start: 2024-08-20 | End: 2024-08-20

## 2024-08-20 RX ORDER — ACETAMINOPHEN 325 MG/1
650 TABLET ORAL EVERY 6 HOURS PRN
Status: DISCONTINUED | OUTPATIENT
Start: 2024-08-20 | End: 2024-08-23 | Stop reason: HOSPADM

## 2024-08-20 RX ORDER — INSULIN LISPRO 100 [IU]/ML
0-8 INJECTION, SOLUTION INTRAVENOUS; SUBCUTANEOUS
Status: DISCONTINUED | OUTPATIENT
Start: 2024-08-21 | End: 2024-08-23 | Stop reason: HOSPADM

## 2024-08-20 RX ORDER — ONDANSETRON 4 MG/1
4 TABLET, ORALLY DISINTEGRATING ORAL EVERY 8 HOURS PRN
Status: DISCONTINUED | OUTPATIENT
Start: 2024-08-20 | End: 2024-08-23 | Stop reason: HOSPADM

## 2024-08-20 RX ADMIN — SODIUM CHLORIDE, PRESERVATIVE FREE 10 ML: 5 INJECTION INTRAVENOUS at 23:40

## 2024-08-20 RX ADMIN — VANCOMYCIN HYDROCHLORIDE 2000 MG: 10 INJECTION, POWDER, LYOPHILIZED, FOR SOLUTION INTRAVENOUS at 18:51

## 2024-08-20 RX ADMIN — MORPHINE SULFATE 4 MG: 4 INJECTION, SOLUTION INTRAMUSCULAR; INTRAVENOUS at 15:26

## 2024-08-20 RX ADMIN — SODIUM CHLORIDE 1500 ML: 9 INJECTION, SOLUTION INTRAVENOUS at 17:47

## 2024-08-20 RX ADMIN — ONDANSETRON 4 MG: 2 INJECTION INTRAMUSCULAR; INTRAVENOUS at 19:52

## 2024-08-20 RX ADMIN — MORPHINE SULFATE 4 MG: 4 INJECTION, SOLUTION INTRAMUSCULAR; INTRAVENOUS at 18:48

## 2024-08-20 RX ADMIN — IOPAMIDOL 80 ML: 612 INJECTION, SOLUTION INTRAVENOUS at 17:31

## 2024-08-20 RX ADMIN — ENOXAPARIN SODIUM 40 MG: 100 INJECTION SUBCUTANEOUS at 21:45

## 2024-08-20 RX ADMIN — ONDANSETRON 4 MG: 2 INJECTION INTRAMUSCULAR; INTRAVENOUS at 15:24

## 2024-08-20 RX ADMIN — PIPERACILLIN AND TAZOBACTAM 4500 MG: 4; .5 INJECTION, POWDER, LYOPHILIZED, FOR SOLUTION INTRAVENOUS at 17:48

## 2024-08-20 RX ADMIN — IOHEXOL 50 ML: 240 INJECTION, SOLUTION INTRATHECAL; INTRAVASCULAR; INTRAVENOUS; ORAL at 14:25

## 2024-08-20 RX ADMIN — SODIUM CHLORIDE, POTASSIUM CHLORIDE, SODIUM LACTATE AND CALCIUM CHLORIDE 1000 ML: 600; 310; 30; 20 INJECTION, SOLUTION INTRAVENOUS at 15:27

## 2024-08-20 RX ADMIN — WATER 1000 MG: 1 INJECTION INTRAMUSCULAR; INTRAVENOUS; SUBCUTANEOUS at 21:45

## 2024-08-20 ASSESSMENT — PAIN SCALES - GENERAL
PAINLEVEL_OUTOF10: 8
PAINLEVEL_OUTOF10: 10
PAINLEVEL_OUTOF10: 5

## 2024-08-20 ASSESSMENT — PAIN DESCRIPTION - LOCATION
LOCATION: ABDOMEN
LOCATION: ABDOMEN

## 2024-08-20 ASSESSMENT — PAIN DESCRIPTION - ORIENTATION
ORIENTATION: MID
ORIENTATION: ANTERIOR;MID

## 2024-08-20 ASSESSMENT — PAIN DESCRIPTION - DESCRIPTORS
DESCRIPTORS: SHARP
DESCRIPTORS: CRAMPING;SHARP

## 2024-08-20 ASSESSMENT — PAIN - FUNCTIONAL ASSESSMENT: PAIN_FUNCTIONAL_ASSESSMENT: 0-10

## 2024-08-20 ASSESSMENT — PAIN DESCRIPTION - PAIN TYPE: TYPE: ACUTE PAIN

## 2024-08-20 NOTE — ED PROVIDER NOTES
[facility] EMERGENCY DEPT  EMERGENCY DEPARTMENT ENCOUNTER    Patient Name: Noreen Ledezma  MRN: [id number]  YOB: 1960  Provider: Rocky Connell DO   PCP: Marli Pride MD   Time/Date of evaluation: 4:35 PM EDT on 8/20/24    History of Presenting Illness     Chief Complaint   Patient presents with    TREY    Bloated     [facility]  Patient presented with nausea, diarrhea, abdominal bloating.  Provider was concerned as creatinine is 1.6, BUN 45.  Most recent creatinine of 1.2 back in January 2024.  COVID-negative.  Sending for further evaluation    Abdominal Pain       History Provided by: Patient   History is limited by: Nothing    HISTORY (Narative):   Noreen Ledezma is a 64 y.o. male with a PMH significant for DM, HTN, Non-Hodgkin's Lymphoma s/p splenectomy, partial colectomy, appendectomy who presents to the emergency department via by POV complaining of generalized abdominal pain and distention. Patient reports associated watery diarrhea. Patient denies vomiting and fever. Patient was seen at [facility] and was sent to the ED for evaluation. Patient also notes pain and erythema to the right thigh at the site of his testosterone injection.    Nursing Notes were all reviewed and agreed with or any disagreements were addressed in the HPI.    Past History     PAST MEDICAL HISTORY:  Past Medical History:   Diagnosis Date    Acid reflux     Arthritis     Cancer (HCC) 1983    non Hodgkin's     Diabetes (HCC)     GERD (gastroesophageal reflux disease)     Hypercholesteremia     Hypertension 01/29/2018    BP spiked when administered anesthesia for scheduled surgery.on 1-    Ill-defined condition     menieres    Meniere disease, left     Non-Hodgkin lymphoma (HCC)        PAST SURGICAL HISTORY:  Past Surgical History:   Procedure Laterality Date    APPENDECTOMY      HERNIA REPAIR Bilateral     HIP ARTHROSCOPY      ORTHOPEDIC SURGERY      wrist pinning, neck fusion, bilateral  Rate 50 (L) >60 ml/min/1.73m2    Calcium 10.5 (H) 8.5 - 10.1 MG/DL    Total Bilirubin 0.6 0.2 - 1.0 MG/DL    ALT 70 (H) 16 - 61 U/L    AST 43 (H) 10 - 38 U/L    Alk Phosphatase 64 45 - 117 U/L    Total Protein 7.9 6.4 - 8.2 g/dL    Albumin 4.1 3.4 - 5.0 g/dL    Globulin 3.8 2.0 - 4.0 g/dL    Albumin/Globulin Ratio 1.1 0.8 - 1.7     Lipase    Collection Time: 08/20/24  1:49 PM   Result Value Ref Range    Lipase 85 (H) 13 - 75 U/L   EKG 12 Lead “IF” over 40 years of age, and Upper Abd pain, SOB, Diaphoresis, Diabetes, or Tachycardia greater than or equal to 120 bmp. (Use as indication for order).    Collection Time: 08/20/24  1:52 PM   Result Value Ref Range    Ventricular Rate 96 BPM    Atrial Rate 96 BPM    P-R Interval 154 ms    QRS Duration 132 ms    Q-T Interval 348 ms    QTc Calculation (Bazett) 439 ms    P Axis 47 degrees    R Axis 261 degrees    T Axis 12 degrees    Diagnosis       Normal sinus rhythm  Right bundle branch block  Abnormal ECG  When compared with ECG of 25-APR-2023 15:16,  Inverted T waves have replaced nonspecific T wave abnormality in Anterior   leads     Urinalysis    Collection Time: 08/20/24  3:12 PM   Result Value Ref Range    Color, UA YELLOW      Appearance CLEAR      Specific Gravity, UA 1.024 1.005 - 1.030      pH, Urine 5.0 5.0 - 8.0      Protein, UA 30 (A) NEG mg/dL    Glucose, Ur Negative NEG mg/dL    Ketones, Urine TRACE (A) NEG mg/dL    Bilirubin, Urine Negative NEG      Blood, Urine Negative NEG      Urobilinogen, Urine 0.2 0.2 - 1.0 EU/dL    Nitrite, Urine Negative NEG      Leukocyte Esterase, Urine Negative NEG     Urinalysis, Micro    Collection Time: 08/20/24  3:12 PM   Result Value Ref Range    WBC, UA Negative 0 - 5 /hpf    RBC, UA Negative 0 - 5 /hpf    Epithelial Cells, UA FEW 0 - 5 /lpf    BACTERIA, URINE FEW (A) NEG /hpf    Hyaline Casts, UA 11 to 20 0 - 2 /lpf       RADIOLOGIC STUDIES:   Non x-ray images such as CT, Ultrasound and MRI are read by the radiologist. X-ray

## 2024-08-20 NOTE — ED NOTES
Assumed care of patient at this time. Pt states he has been having upper abdominal pain and N/V/D since thi morning. Pt has hx of non-hodgkin's lymphoma, has had his spleen and appendix removed and part of his liver removed.

## 2024-08-20 NOTE — ED PROVIDER NOTES
EMERGENCY DEPARTMENT CONTINUING CARE NOTE      THIS NOTE IS DOCUMENTATION OF CARE PROVIDED AFTER CARE OF THE PATIENT WAS TRANSFERRED TO ME. PLEASE SEE THE NOTE BY THE INITIAL ED PROVIDER FOR DETAILS SURROUNDING THE H&P AND INITIAL MEDICAL DECISION MAKING.    Patient Name: Noreen Ledezma  MRN: [id number]  YOB: 1960  Provider: Yunier Becker MD  PCP: Marli Pride MD   Date of transfer of care: 8/20/24  ED Bed: 13    Diagnostic Study Results     LABS:  Recent Results (from the past 12 hour(s))   CBC with Auto Differential    Collection Time: 08/20/24  1:49 PM   Result Value Ref Range    WBC 29.0 (H) 4.6 - 13.2 K/uL    RBC 4.81 4.35 - 5.65 M/uL    Hemoglobin 15.8 13.0 - 16.0 g/dL    Hematocrit 46.7 36.0 - 48.0 %    MCV 97.1 78.0 - 100.0 FL    MCH 32.8 24.0 - 34.0 PG    MCHC 33.8 31.0 - 37.0 g/dL    RDW 14.9 (H) 11.6 - 14.5 %    Platelets 568 (H) 135 - 420 K/uL    MPV 9.0 (L) 9.2 - 11.8 FL    Nucleated RBCs 0.1 (H) 0  WBC    nRBC 0.02 (H) 0.00 - 0.01 K/uL    Neutrophils % 86 (H) 40 - 73 %    Lymphocytes % 4 (L) 21 - 52 %    Monocytes % 8 3 - 10 %    Eosinophils % 1 0 - 5 %    Basophils % 0 0 - 2 %    Immature Granulocytes % 1 (H) 0.0 - 0.5 %    Neutrophils Absolute 24.9 (H) 1.8 - 8.0 K/UL    Lymphocytes Absolute 1.2 0.9 - 3.6 K/UL    Monocytes Absolute 2.3 (H) 0.05 - 1.2 K/UL    Eosinophils Absolute 0.3 0.0 - 0.4 K/UL    Basophils Absolute 0.0 0.0 - 0.1 K/UL    Immature Granulocytes Absolute 0.3 (H) 0.00 - 0.04 K/UL    Differential Type AUTOMATED      Platelet Comment Increased Platelets      RBC Comment NORMOCYTIC, NORMOCHROMIC     Comprehensive Metabolic Panel    Collection Time: 08/20/24  1:49 PM   Result Value Ref Range    Sodium 136 136 - 145 mmol/L    Potassium 4.6 3.5 - 5.5 mmol/L    Chloride 104 100 - 111 mmol/L    CO2 27 21 - 32 mmol/L    Anion Gap 5 3.0 - 18 mmol/L    Glucose 123 (H) 74 - 99 mg/dL    BUN 46 (H) 7.0 - 18 MG/DL    Creatinine 1.54 (H) 0.6 - 1.3 MG/DL    BUN/Creatinine Ratio 30 (H)  for providing this level of medical care for this critically ill patient was due a critical illness that impaired one or more vital organ systems such that there was a high probability of imminent or life threatening deterioration in the patients condition. This care involved high complexity decision making to assess, manipulate, and support vital system functions, to treat this degreee vital organ system failure and to prevent further life threatening deterioration of the patient’s condition.    Yunier Becker MD      Diagnosis and Disposition     DISPOSITION Admitted 08/20/2024 08:30:05 PM  Condition at Disposition: Data Unavailable    ADMISSION NOTE:    Critical Care Time: 35 minutes    Core Measures (for hospitalized patients):    1. Hospitalization Decision Time:  The decision to hospitalize the patient was made by Yunier Becker MD @ at 5:15 PM on 8/20/2024    2. Aspirin: Aspirin was not given because the patient did not present with a stroke at the time of their Emergency Department evaluation    8:08 PM. Patient is being admitted to the hospital by Dr. Payan. The results of their tests and reasons for their admission have been discussed with them and/or available family. They convey agreement and understanding for the need to be admitted and for their admission diagnosis.      CONDITIONS ON ADMISSION:  Sepsis is  present at the time of admission. Deep Vein Thrombosis is not present at the time of admission. Thrombosis is not present at the time of admission. Urinary Tract Infection is not present at the time of admission. Pneumonia is not present at the time of admission. MRSA is not present at the time of admission. Wound infection is not present at the time of admission. Pressure Ulcer is not present at the time of admission.      CLINICAL IMPRESSION:  1. Cellulitis of right lower extremity    2. Sepsis, due to unspecified organism, unspecified whether acute organ dysfunction present (HCC)    3. TREY  (acute kidney injury) (HCC)    4. Cellulitis of lower leg        PLAN:  Admit to telemetry    Dragon Disclaimer     Please note that this dictation was completed with NOZA, the computer voice recognition software.  Quite often unanticipated grammatical, syntax, homophones, and other interpretive errors are inadvertently transcribed by the computer software.  Please disregard these errors.  Please excuse any errors that have escaped final proofreading.    Yunier Becker MD  (Electronically signed)           Yunier Becker MD  08/20/24 5229

## 2024-08-20 NOTE — ED NOTES
Informed CT that pt is finished with oral contrast. CT advised that they will get the patient in 30 min.

## 2024-08-20 NOTE — ED TRIAGE NOTES
Patient reports having abdominal pain and headache. Went to [facility] they told him to come to ed

## 2024-08-21 ENCOUNTER — APPOINTMENT (OUTPATIENT)
Facility: HOSPITAL | Age: 64
End: 2024-08-21
Attending: HOSPITALIST
Payer: COMMERCIAL

## 2024-08-21 LAB
ACB COMPLEX DNA BLD POS QL NAA+NON-PROBE: NOT DETECTED
ACCESSION NUMBER, LLC1M: ABNORMAL
ANION GAP SERPL CALC-SCNC: 4 MMOL/L (ref 3–18)
B FRAGILIS DNA BLD POS QL NAA+NON-PROBE: NOT DETECTED
BASOPHILS # BLD: 0 K/UL (ref 0–0.1)
BASOPHILS NFR BLD: 0 % (ref 0–2)
BIOFIRE TEST COMMENT: ABNORMAL
BUN SERPL-MCNC: 33 MG/DL (ref 7–18)
BUN/CREAT SERPL: 28 (ref 12–20)
C ALBICANS DNA BLD POS QL NAA+NON-PROBE: NOT DETECTED
C AURIS DNA BLD POS QL NAA+NON-PROBE: NOT DETECTED
C GATTII+NEOFOR DNA BLD POS QL NAA+N-PRB: NOT DETECTED
C GLABRATA DNA BLD POS QL NAA+NON-PROBE: NOT DETECTED
C KRUSEI DNA BLD POS QL NAA+NON-PROBE: NOT DETECTED
C PARAP DNA BLD POS QL NAA+NON-PROBE: NOT DETECTED
C TROPICLS DNA BLD POS QL NAA+NON-PROBE: NOT DETECTED
CALCIUM SERPL-MCNC: 9 MG/DL (ref 8.5–10.1)
CHLORIDE SERPL-SCNC: 107 MMOL/L (ref 100–111)
CO2 SERPL-SCNC: 25 MMOL/L (ref 21–32)
CREAT SERPL-MCNC: 1.17 MG/DL (ref 0.6–1.3)
DIFFERENTIAL METHOD BLD: ABNORMAL
E CLOAC COMP DNA BLD POS NAA+NON-PROBE: NOT DETECTED
E COLI DNA BLD POS QL NAA+NON-PROBE: NOT DETECTED
E FAECALIS DNA BLD POS QL NAA+NON-PROBE: NOT DETECTED
E FAECIUM DNA BLD POS QL NAA+NON-PROBE: NOT DETECTED
ECHO BSA: 2.07 M2
EKG ATRIAL RATE: 96 BPM
EKG DIAGNOSIS: NORMAL
EKG P AXIS: 47 DEGREES
EKG P-R INTERVAL: 154 MS
EKG Q-T INTERVAL: 348 MS
EKG QRS DURATION: 132 MS
EKG QTC CALCULATION (BAZETT): 439 MS
EKG R AXIS: 261 DEGREES
EKG T AXIS: 12 DEGREES
EKG VENTRICULAR RATE: 96 BPM
ENTEROBACTERALES DNA BLD POS NAA+N-PRB: NOT DETECTED
EOSINOPHIL # BLD: 0.3 K/UL (ref 0–0.4)
EOSINOPHIL NFR BLD: 2 % (ref 0–5)
ERYTHROCYTE [DISTWIDTH] IN BLOOD BY AUTOMATED COUNT: 14.9 % (ref 11.6–14.5)
EST. AVERAGE GLUCOSE BLD GHB EST-MCNC: 114 MG/DL
FLUAV RNA SPEC QL NAA+PROBE: NOT DETECTED
FLUBV RNA SPEC QL NAA+PROBE: NOT DETECTED
GLUCOSE BLD STRIP.AUTO-MCNC: 113 MG/DL (ref 70–110)
GLUCOSE BLD STRIP.AUTO-MCNC: 125 MG/DL (ref 70–110)
GLUCOSE BLD STRIP.AUTO-MCNC: 81 MG/DL (ref 70–110)
GLUCOSE BLD STRIP.AUTO-MCNC: 95 MG/DL (ref 70–110)
GLUCOSE SERPL-MCNC: 95 MG/DL (ref 74–99)
GP B STREP DNA BLD POS QL NAA+NON-PROBE: NOT DETECTED
HAEM INFLU DNA BLD POS QL NAA+NON-PROBE: NOT DETECTED
HBA1C MFR BLD: 5.6 % (ref 4.2–5.6)
HCT VFR BLD AUTO: 39.8 % (ref 36–48)
HGB BLD-MCNC: 13.3 G/DL (ref 13–16)
IMM GRANULOCYTES # BLD AUTO: 0.1 K/UL (ref 0–0.04)
IMM GRANULOCYTES NFR BLD AUTO: 1 % (ref 0–0.5)
K OXYTOCA DNA BLD POS QL NAA+NON-PROBE: NOT DETECTED
KLEBSIELLA SP DNA BLD POS QL NAA+NON-PRB: NOT DETECTED
KLEBSIELLA SP DNA BLD POS QL NAA+NON-PRB: NOT DETECTED
L MONOCYTOG DNA BLD POS QL NAA+NON-PROBE: NOT DETECTED
LYMPHOCYTES # BLD: 1.5 K/UL (ref 0.9–3.6)
LYMPHOCYTES NFR BLD: 8 % (ref 21–52)
MCH RBC QN AUTO: 33.3 PG (ref 24–34)
MCHC RBC AUTO-ENTMCNC: 33.4 G/DL (ref 31–37)
MCV RBC AUTO: 99.5 FL (ref 78–100)
MONOCYTES # BLD: 1.8 K/UL (ref 0.05–1.2)
MONOCYTES NFR BLD: 10 % (ref 3–10)
N MEN DNA BLD POS QL NAA+NON-PROBE: NOT DETECTED
NEUTS SEG # BLD: 14.5 K/UL (ref 1.8–8)
NEUTS SEG NFR BLD: 79 % (ref 40–73)
NRBC # BLD: 0.02 K/UL (ref 0–0.01)
NRBC BLD-RTO: 0.1 PER 100 WBC
P AERUGINOSA DNA BLD POS NAA+NON-PROBE: NOT DETECTED
PLATELET # BLD AUTO: 483 K/UL (ref 135–420)
PMV BLD AUTO: 9.3 FL (ref 9.2–11.8)
POTASSIUM SERPL-SCNC: 4.4 MMOL/L (ref 3.5–5.5)
PROTEUS SP DNA BLD POS QL NAA+NON-PROBE: NOT DETECTED
RBC # BLD AUTO: 4 M/UL (ref 4.35–5.65)
RESISTANT GENE TARGETS: ABNORMAL
S AUREUS DNA BLD POS QL NAA+NON-PROBE: NOT DETECTED
S AUREUS+CONS DNA BLD POS NAA+NON-PROBE: DETECTED
S EPIDERMIDIS DNA BLD POS QL NAA+NON-PRB: NOT DETECTED
S LUGDUNENSIS DNA BLD POS QL NAA+NON-PRB: NOT DETECTED
S MALTOPHILIA DNA BLD POS QL NAA+NON-PRB: NOT DETECTED
S MARCESCENS DNA BLD POS NAA+NON-PROBE: NOT DETECTED
S PNEUM DNA BLD POS QL NAA+NON-PROBE: NOT DETECTED
S PYO DNA BLD POS QL NAA+NON-PROBE: NOT DETECTED
SALMONELLA DNA BLD POS QL NAA+NON-PROBE: NOT DETECTED
SARS-COV-2 RNA RESP QL NAA+PROBE: NOT DETECTED
SODIUM SERPL-SCNC: 136 MMOL/L (ref 136–145)
SOURCE: NORMAL
STREPTOCOCCUS DNA BLD POS NAA+NON-PROBE: NOT DETECTED
WBC # BLD AUTO: 18.2 K/UL (ref 4.6–13.2)

## 2024-08-21 PROCEDURE — 93970 EXTREMITY STUDY: CPT

## 2024-08-21 PROCEDURE — 6360000002 HC RX W HCPCS: Performed by: HOSPITALIST

## 2024-08-21 PROCEDURE — 2580000003 HC RX 258: Performed by: HOSPITALIST

## 2024-08-21 PROCEDURE — 85025 COMPLETE CBC W/AUTO DIFF WBC: CPT

## 2024-08-21 PROCEDURE — 87324 CLOSTRIDIUM AG IA: CPT

## 2024-08-21 PROCEDURE — 6360000002 HC RX W HCPCS: Performed by: FAMILY MEDICINE

## 2024-08-21 PROCEDURE — 87506 IADNA-DNA/RNA PROBE TQ 6-11: CPT

## 2024-08-21 PROCEDURE — 94640 AIRWAY INHALATION TREATMENT: CPT

## 2024-08-21 PROCEDURE — 87449 NOS EACH ORGANISM AG IA: CPT

## 2024-08-21 PROCEDURE — 82962 GLUCOSE BLOOD TEST: CPT

## 2024-08-21 PROCEDURE — 93010 ELECTROCARDIOGRAM REPORT: CPT | Performed by: INTERNAL MEDICINE

## 2024-08-21 PROCEDURE — 36415 COLL VENOUS BLD VENIPUNCTURE: CPT

## 2024-08-21 PROCEDURE — 80048 BASIC METABOLIC PNL TOTAL CA: CPT

## 2024-08-21 PROCEDURE — 6370000000 HC RX 637 (ALT 250 FOR IP): Performed by: HOSPITALIST

## 2024-08-21 PROCEDURE — 1100000003 HC PRIVATE W/ TELEMETRY

## 2024-08-21 RX ORDER — TIRZEPATIDE 5 MG/.5ML
5 INJECTION, SOLUTION SUBCUTANEOUS WEEKLY
COMMUNITY
Start: 2024-06-19

## 2024-08-21 RX ORDER — FLUTICASONE PROPIONATE AND SALMETEROL 100; 50 UG/1; UG/1
1 POWDER RESPIRATORY (INHALATION) 2 TIMES DAILY
COMMUNITY
Start: 2024-08-05

## 2024-08-21 RX ORDER — FERROUS SULFATE 325(65) MG
325 TABLET ORAL
COMMUNITY

## 2024-08-21 RX ORDER — PERPHENAZINE 16 MG
600 TABLET ORAL 2 TIMES DAILY
COMMUNITY

## 2024-08-21 RX ADMIN — ARFORMOTEROL TARTRATE: 15 SOLUTION RESPIRATORY (INHALATION) at 19:47

## 2024-08-21 RX ADMIN — ONDANSETRON 4 MG: 2 INJECTION INTRAMUSCULAR; INTRAVENOUS at 08:18

## 2024-08-21 RX ADMIN — ASPIRIN 81 MG: 81 TABLET, COATED ORAL at 08:19

## 2024-08-21 RX ADMIN — MORPHINE SULFATE 2 MG: 2 INJECTION, SOLUTION INTRAMUSCULAR; INTRAVENOUS at 00:38

## 2024-08-21 RX ADMIN — ONDANSETRON 4 MG: 2 INJECTION INTRAMUSCULAR; INTRAVENOUS at 00:40

## 2024-08-21 RX ADMIN — MORPHINE SULFATE 2 MG: 2 INJECTION, SOLUTION INTRAMUSCULAR; INTRAVENOUS at 05:30

## 2024-08-21 RX ADMIN — ARFORMOTEROL TARTRATE: 15 SOLUTION RESPIRATORY (INHALATION) at 08:26

## 2024-08-21 RX ADMIN — METOPROLOL SUCCINATE 12.5 MG: 25 TABLET, EXTENDED RELEASE ORAL at 08:19

## 2024-08-21 RX ADMIN — ONDANSETRON 4 MG: 2 INJECTION INTRAMUSCULAR; INTRAVENOUS at 13:32

## 2024-08-21 RX ADMIN — SODIUM CHLORIDE: 9 INJECTION, SOLUTION INTRAVENOUS at 00:51

## 2024-08-21 RX ADMIN — ONDANSETRON 4 MG: 2 INJECTION INTRAMUSCULAR; INTRAVENOUS at 18:14

## 2024-08-21 RX ADMIN — ENOXAPARIN SODIUM 40 MG: 100 INJECTION SUBCUTANEOUS at 21:32

## 2024-08-21 RX ADMIN — TAMSULOSIN HYDROCHLORIDE 0.4 MG: 0.4 CAPSULE ORAL at 08:19

## 2024-08-21 RX ADMIN — SODIUM CHLORIDE, PRESERVATIVE FREE 10 ML: 5 INJECTION INTRAVENOUS at 21:33

## 2024-08-21 RX ADMIN — SODIUM CHLORIDE: 9 INJECTION, SOLUTION INTRAVENOUS at 13:32

## 2024-08-21 RX ADMIN — SODIUM CHLORIDE, PRESERVATIVE FREE 10 ML: 5 INJECTION INTRAVENOUS at 08:19

## 2024-08-21 RX ADMIN — VANCOMYCIN HYDROCHLORIDE 1500 MG: 10 INJECTION, POWDER, LYOPHILIZED, FOR SOLUTION INTRAVENOUS at 21:35

## 2024-08-21 ASSESSMENT — PAIN SCALES - GENERAL
PAINLEVEL_OUTOF10: 5
PAINLEVEL_OUTOF10: 3
PAINLEVEL_OUTOF10: 3
PAINLEVEL_OUTOF10: 7

## 2024-08-21 ASSESSMENT — PAIN DESCRIPTION - LOCATION: LOCATION: ABDOMEN

## 2024-08-21 ASSESSMENT — PAIN DESCRIPTION - ORIENTATION: ORIENTATION: ANTERIOR;MID

## 2024-08-21 ASSESSMENT — PAIN DESCRIPTION - DESCRIPTORS: DESCRIPTORS: ACHING;CRAMPING

## 2024-08-21 NOTE — PROGRESS NOTES
Patient has not had a bowel movement since arriving on floor. Pt is aware we need a sample and will inform us when completed.    Unable to collect sample for testing at this time

## 2024-08-21 NOTE — PROGRESS NOTES
TRANSFER - OUT REPORT:    Verbal report given to Earle THRASHER on Noreen Ledezma  being transferred to  for routine progression of patient care       Report consisted of patient's Situation, Background, Assessment and   Recommendations(SBAR).     Information from the following report(s) Nurse Handoff Report, ED Encounter Summary, ED SBAR, Intake/Output, MAR, Recent Results, and Neuro Assessment was reviewed with the receiving nurse.    [place] Fall Assessment:    Presents to emergency department  because of falls (Syncope, seizure, or loss of consciousness): No  Age > 70: No  Altered Mental Status, Intoxication with alcohol or substance confusion (Disorientation, impaired judgment, poor safety awaremess, or inability to follow instructions): No  Impaired Mobility: Ambulates or transfers with assistive devices or assistance; Unable to ambulate or transer.: No  Nursing Judgement: No          Lines:   Peripheral IV 08/20/24 Right Antecubital (Active)        Opportunity for questions and clarification was provided.      Patient transported with:  Monitor and Registered Nurse

## 2024-08-21 NOTE — PROGRESS NOTES
[facility]   Pharmacy Pharmacokinetic Monitoring Service - Vancomycin    Consulting Provider: Dr. Payan   Indication: Skin and Soft Tissue Infection x 7 days  Target Concentration: Goal AUC/QUETA 400-600 mg*hr/L  Day of Therapy: 2  Additional Antimicrobials: Rocephin    Pertinent Laboratory Values:   Wt Readings from Last 1 Encounters:   08/20/24 88.9 kg (196 lb)     Temp Readings from Last 1 Encounters:   08/21/24 97.9 °F (36.6 °C) (Temporal)     Estimated Creatinine Clearance: 70 mL/min (based on SCr of 1.17 mg/dL).  Recent Labs     08/20/24  1349 08/21/24  0213   CREATININE 1.54* 1.17   BUN 46* 33*   WBC 29.0* 18.2*         Pertinent Cultures:  Culture Date Source Results   8/20 Blood x 2 IP   MRSA Nasal Swab: N/A. Non-respiratory infection.    Recent vancomycin administrations                     vancomycin (VANCOCIN) 2000 mg in 0.9% sodium chloride 500 mL IVPB (mg) 2,000 mg New Bag 08/20/24 5407                      Plan:  Current dosing regimen is therapeutic  Continue current dose of Vancomycin 1,500 mg IV every 24 hours  Anticipated AUC/Tr = 446 mg/L.hr, 10.8 mg/L at Steady State  Repeat vancomycin concentration ordered for 8/22 @ 0600   Pharmacy will continue to monitor patient and adjust therapy as indicated    Thank you for the consult,  SOLO WOODWARD RPH, PharmD  8/21/2024 9:18 AM

## 2024-08-21 NOTE — PROGRESS NOTES
Consult received, chart reviewed. GPC In blood 1/4 bottles.   Leucocytosis in splectomy patient  Enteritis- ?    Cont current abx  Will see patient tomorrow  Thanks    Olesya Best MD  [facility])  Office: [phone] -Option #8  Office fax:  [phone]

## 2024-08-21 NOTE — PROGRESS NOTES
[facility]   Pharmacy Pharmacokinetic Monitoring Service - Vancomycin     Noreen Ledezma is a 64 y.o. male starting on vancomycin therapy for SSTI x 7 days. Pharmacy consulted by Dr. Payan for monitoring and adjustment.    Target Concentration: Goal AUC/QUETA 400-600 mg*hr/L    Additional Antimicrobials: ceftriaxone    Pertinent Laboratory Values:   Wt Readings from Last 1 Encounters:   08/20/24 88.9 kg (196 lb)     Temp Readings from Last 1 Encounters:   08/20/24 97.6 °F (36.4 °C)     Estimated Creatinine Clearance: 53 mL/min (A) (based on SCr of 1.54 mg/dL (H)).  Recent Labs     08/20/24  1349   CREATININE 1.54*   BUN 46*   WBC 29.0*     Plan:  Dosing recommendations based on Bayesian software  Start vancomycin 2000 mg IV x 1 dose , continue with vancomycin 1500 mg IV q24h  Anticipated AUC of 556 mg/L.hr and trough concentration of 15.1 mg/L at steady state  Renal labs as indicated   Pharmacy will continue to monitor patient and adjust therapy as indicated    Thank you for the consult,  Dave Menard RPH  8/20/2024 10:02 PM

## 2024-08-21 NOTE — PLAN OF CARE
Problem: Discharge Planning  Goal: Discharge to home or other facility with appropriate resources  Outcome: Progressing  Flowsheets (Taken 8/20/2024 2338 by So Vaz, RN)  Discharge to home or other facility with appropriate resources:   Identify barriers to discharge with patient and caregiver   Arrange for needed discharge resources and transportation as appropriate   Identify discharge learning needs (meds, wound care, etc)   Refer to discharge planning if patient needs post-hospital services based on physician order or complex needs related to functional status, cognitive ability or social support system     Problem: Pain  Goal: Verbalizes/displays adequate comfort level or baseline comfort level  8/21/2024 0913 by Clarisa Bell RN  Outcome: Progressing  8/21/2024 0541 by So Vaz RN  Outcome: Progressing  Flowsheets (Taken 8/20/2024 2338)  Verbalizes/displays adequate comfort level or baseline comfort level:   Encourage patient to monitor pain and request assistance   Assess pain using appropriate pain scale   Administer analgesics based on type and severity of pain and evaluate response   Implement non-pharmacological measures as appropriate and evaluate response   Consider cultural and social influences on pain and pain management   Notify Licensed Independent Practitioner if interventions unsuccessful or patient reports new pain     Problem: Safety - Adult  Goal: Free from fall injury  8/21/2024 0913 by Clarisa Bell RN  Outcome: Progressing  8/21/2024 0541 by So Vaz RN  Outcome: Progressing     Problem: Chronic Conditions and Co-morbidities  Goal: Patient's chronic conditions and co-morbidity symptoms are monitored and maintained or improved  8/21/2024 0913 by Clarisa Bell RN  Outcome: Progressing  8/21/2024 0541 by So Vaz RN  Outcome: Progressing

## 2024-08-21 NOTE — CARE COORDINATION
08/21/24 0941   Service Assessment   Patient Orientation Alert and Oriented   Cognition Alert   History Provided By Patient   Primary Caregiver Self   Accompanied By/Relationship N/A   Support Systems Spouse/Significant Other   PCP Verified by CM Yes   Last Visit to PCP Within last 6 months   Prior Functional Level Independent in ADLs/IADLs   Current Functional Level Independent in ADLs/IADLs   Can patient return to prior living arrangement Yes   Ability to make needs known: Good   Family able to assist with home care needs: Yes   Would you like for me to discuss the discharge plan with any other family members/significant others, and if so, who? Yes   Financial Resources Other (Comment)  (BCBS)   Social/Functional History   Lives With Spouse   Type of Home House   Bathroom Equipment None   Home Equipment None   ADL Assistance Independent   Homemaking Assistance Independent   Ambulation Assistance Independent   Transfer Assistance Independent   Active  Yes   Mode of Transportation Car   Discharge Planning   Type of Residence House   Living Arrangements Spouse/Significant Other   Current Services Prior To Admission None   Potential Assistance Needed N/A   DME Ordered? No   Potential Assistance Purchasing Medications No   Type of Home Care Services None   Patient expects to be discharged to: House   Services At/After Discharge   Transition of Care Consult (CM Consult) Discharge Planning   Services At/After Discharge None   Mode of Transport at Discharge Other (see comment)  (Family)   Confirm Follow Up Transport Self   Condition of Participation: Discharge Planning   The Plan for Transition of Care is related to the following treatment goals: The patient states at the time of DC his plan is to DC to home when stable   The Patient and/or Patient Representative was provided with a Choice of Provider? Patient   The Patient and/Or Patient Representative agree with the Discharge Plan? Yes   Freedom of Choice list  was provided with basic dialogue that supports the patient's individualized plan of care/goals, treatment preferences, and shares the quality data associated with the providers?  Yes     This CM met with the patient at the bedside to discuss DC planning. The patient states prior to admission he was independent at home and denies the use of DME. The patient states at the time of DC his plan is to DC to home with family. The patient is pending workup. Will continue to follow.

## 2024-08-21 NOTE — H&P
History & Physical    Patient: Noreen Ledezma MRN: [id number]  CSN: [id number]    YOB: 1960  Age: 64 y.o.  Sex: male      DOA: 8/20/2024  Primary Care Provider:  Marli Pride MD      Assessment/Plan     Active Hospital Problems    Diagnosis Date Noted    Sepsis (HCC) [A41.9] 08/20/2024    Cellulitis of right thigh [L03.115] 08/20/2024    Diarrhea [R19.7] 08/20/2024    GERD (gastroesophageal reflux disease) [K21.9] 05/28/2018    HTN (hypertension) [I10] 05/28/2018    Probable sepsis [R68.89] 05/28/2018    S/P splenectomy [Z90.81] 04/27/2014         Admit to tele     Sepsis  and cellulitis   Continue iv  abx and f/u with cx   Septic protocol, pan cx , iv fluid ,lactic acid monitoring  Iv abx started in Er and will continue, received iv n bolus     Diarrhea   Iv hydration, send for c diff and covid   Continue f/u   Ct abdomen no acute issue      HTN, accelerated  Continue home medication.    Brian due to dehydration hypercalcemia   Due to dehydration     Gerd ppi     S/p splenectomy     DM on metformin at home   Ssi         Full code     Please note that this dictation was completed with [email or web address], the computer voice recognition software.  Quite often unanticipated grammatical, syntax, homophones, and other interpretive errors are inadvertently transcribed by the computer software.  Please disregard these errors.  Please excuse any errors that have escaped final proofreading      Prophylaxis: [x]Lovenox  []Coumadin  []Hep SQ  []SCD’s  [] Eliquis     Discussed Code Status:  [x]Full Code      []DNR            ___________________________________________________     Care Plan discussed with:    [x]Patient   []Family    []ED Care Manager  [x]ED Doc   []Specialist :     Total Time Coordinating Admission: 70  minutes      Estimate  length of stay : 2-3 day      CC: Right leg pain       HPI:     Noreen Ledezma is a 64 y.o. male with history of lymphoma, splenectomy, hypertension GERD presented to ER due to right leg

## 2024-08-22 PROBLEM — R78.81 POSITIVE BLOOD CULTURE: Status: ACTIVE | Noted: 2024-08-22

## 2024-08-22 LAB
ANION GAP SERPL CALC-SCNC: 6 MMOL/L (ref 3–18)
BACTERIA SPEC CULT: ABNORMAL
BASOPHILS # BLD: 0 K/UL (ref 0–0.1)
BASOPHILS NFR BLD: 0 % (ref 0–2)
BUN SERPL-MCNC: 24 MG/DL (ref 7–18)
BUN/CREAT SERPL: 23 (ref 12–20)
C COLI+JEJUNI TUF STL QL NAA+PROBE: NEGATIVE
C DIFF GDH STL QL: NEGATIVE
C DIFF TOX A+B STL QL IA: NEGATIVE
C DIFF TOXIN INTERPRETATION: NORMAL
CALCIUM SERPL-MCNC: 8.8 MG/DL (ref 8.5–10.1)
CHLORIDE SERPL-SCNC: 106 MMOL/L (ref 100–111)
CO2 SERPL-SCNC: 26 MMOL/L (ref 21–32)
CREAT SERPL-MCNC: 1.06 MG/DL (ref 0.6–1.3)
DIFFERENTIAL METHOD BLD: ABNORMAL
EC STX1+STX2 GENES STL QL NAA+PROBE: NEGATIVE
EOSINOPHIL # BLD: 0.1 K/UL (ref 0–0.4)
EOSINOPHIL NFR BLD: 1 % (ref 0–5)
ERYTHROCYTE [DISTWIDTH] IN BLOOD BY AUTOMATED COUNT: 15 % (ref 11.6–14.5)
ETEC ELTA+ESTB GENES STL QL NAA+PROBE: NEGATIVE
GLUCOSE BLD STRIP.AUTO-MCNC: 145 MG/DL (ref 70–110)
GLUCOSE BLD STRIP.AUTO-MCNC: 88 MG/DL (ref 70–110)
GLUCOSE BLD STRIP.AUTO-MCNC: 90 MG/DL (ref 70–110)
GLUCOSE BLD STRIP.AUTO-MCNC: 94 MG/DL (ref 70–110)
GLUCOSE SERPL-MCNC: 93 MG/DL (ref 74–99)
GRAM STN SPEC: ABNORMAL
GRAM STN SPEC: ABNORMAL
HCT VFR BLD AUTO: 39.2 % (ref 36–48)
HGB BLD-MCNC: 13.1 G/DL (ref 13–16)
IMM GRANULOCYTES # BLD AUTO: 0.1 K/UL (ref 0–0.04)
IMM GRANULOCYTES NFR BLD AUTO: 0 % (ref 0–0.5)
LYMPHOCYTES # BLD: 3.3 K/UL (ref 0.9–3.6)
LYMPHOCYTES NFR BLD: 25 % (ref 21–52)
MCH RBC QN AUTO: 33.1 PG (ref 24–34)
MCHC RBC AUTO-ENTMCNC: 33.4 G/DL (ref 31–37)
MCV RBC AUTO: 99 FL (ref 78–100)
MONOCYTES # BLD: 1.8 K/UL (ref 0.05–1.2)
MONOCYTES NFR BLD: 13 % (ref 3–10)
NEUTS SEG # BLD: 8 K/UL (ref 1.8–8)
NEUTS SEG NFR BLD: 60 % (ref 40–73)
NRBC # BLD: 0 K/UL (ref 0–0.01)
NRBC BLD-RTO: 0 PER 100 WBC
P SHIGELLOIDES DNA STL QL NAA+PROBE: NEGATIVE
PLATELET # BLD AUTO: 501 K/UL (ref 135–420)
PMV BLD AUTO: 9.2 FL (ref 9.2–11.8)
POTASSIUM SERPL-SCNC: 4.5 MMOL/L (ref 3.5–5.5)
RBC # BLD AUTO: 3.96 M/UL (ref 4.35–5.65)
SALMONELLA SP SPAO STL QL NAA+PROBE: NEGATIVE
SERVICE CMNT-IMP: ABNORMAL
SHIGELLA SP+EIEC IPAH STL QL NAA+PROBE: NEGATIVE
SODIUM SERPL-SCNC: 138 MMOL/L (ref 136–145)
V CHOL+PARA+VUL DNA STL QL NAA+NON-PROBE: NEGATIVE
VANCOMYCIN SERPL-MCNC: 21.8 UG/ML (ref 5–40)
WBC # BLD AUTO: 13.2 K/UL (ref 4.6–13.2)
Y ENTEROCOL DNA STL QL NAA+NON-PROBE: NEGATIVE

## 2024-08-22 PROCEDURE — 6370000000 HC RX 637 (ALT 250 FOR IP): Performed by: HOSPITALIST

## 2024-08-22 PROCEDURE — 2580000003 HC RX 258: Performed by: HOSPITALIST

## 2024-08-22 PROCEDURE — 6360000002 HC RX W HCPCS: Performed by: HOSPITALIST

## 2024-08-22 PROCEDURE — 1100000003 HC PRIVATE W/ TELEMETRY

## 2024-08-22 PROCEDURE — 80048 BASIC METABOLIC PNL TOTAL CA: CPT

## 2024-08-22 PROCEDURE — 80202 ASSAY OF VANCOMYCIN: CPT

## 2024-08-22 PROCEDURE — 6360000002 HC RX W HCPCS: Performed by: INTERNAL MEDICINE

## 2024-08-22 PROCEDURE — 85025 COMPLETE CBC W/AUTO DIFF WBC: CPT

## 2024-08-22 PROCEDURE — 94640 AIRWAY INHALATION TREATMENT: CPT

## 2024-08-22 PROCEDURE — 36415 COLL VENOUS BLD VENIPUNCTURE: CPT

## 2024-08-22 PROCEDURE — 82962 GLUCOSE BLOOD TEST: CPT

## 2024-08-22 RX ORDER — LINEZOLID 2 MG/ML
600 INJECTION, SOLUTION INTRAVENOUS EVERY 12 HOURS
Status: DISCONTINUED | OUTPATIENT
Start: 2024-08-22 | End: 2024-08-23 | Stop reason: HOSPADM

## 2024-08-22 RX ADMIN — ONDANSETRON 4 MG: 2 INJECTION INTRAMUSCULAR; INTRAVENOUS at 00:20

## 2024-08-22 RX ADMIN — ONDANSETRON 4 MG: 2 INJECTION INTRAMUSCULAR; INTRAVENOUS at 08:11

## 2024-08-22 RX ADMIN — ONDANSETRON 4 MG: 2 INJECTION INTRAMUSCULAR; INTRAVENOUS at 14:00

## 2024-08-22 RX ADMIN — VANCOMYCIN HYDROCHLORIDE 1000 MG: 1 INJECTION, POWDER, LYOPHILIZED, FOR SOLUTION INTRAVENOUS at 10:11

## 2024-08-22 RX ADMIN — ASPIRIN 81 MG: 81 TABLET, COATED ORAL at 09:30

## 2024-08-22 RX ADMIN — SODIUM CHLORIDE, PRESERVATIVE FREE 10 ML: 5 INJECTION INTRAVENOUS at 09:33

## 2024-08-22 RX ADMIN — ARFORMOTEROL TARTRATE: 15 SOLUTION RESPIRATORY (INHALATION) at 07:42

## 2024-08-22 RX ADMIN — ARFORMOTEROL TARTRATE: 15 SOLUTION RESPIRATORY (INHALATION) at 19:27

## 2024-08-22 RX ADMIN — TAMSULOSIN HYDROCHLORIDE 0.4 MG: 0.4 CAPSULE ORAL at 09:30

## 2024-08-22 RX ADMIN — METOPROLOL SUCCINATE 12.5 MG: 25 TABLET, EXTENDED RELEASE ORAL at 09:30

## 2024-08-22 RX ADMIN — ACETAMINOPHEN 650 MG: 325 TABLET ORAL at 09:30

## 2024-08-22 RX ADMIN — ONDANSETRON 4 MG: 2 INJECTION INTRAMUSCULAR; INTRAVENOUS at 19:58

## 2024-08-22 RX ADMIN — SODIUM CHLORIDE: 9 INJECTION, SOLUTION INTRAVENOUS at 09:29

## 2024-08-22 RX ADMIN — WATER 1000 MG: 1 INJECTION INTRAMUSCULAR; INTRAVENOUS; SUBCUTANEOUS at 21:31

## 2024-08-22 RX ADMIN — WATER 1000 MG: 1 INJECTION INTRAMUSCULAR; INTRAVENOUS; SUBCUTANEOUS at 00:21

## 2024-08-22 RX ADMIN — LINEZOLID 600 MG: 600 INJECTION, SOLUTION INTRAVENOUS at 20:01

## 2024-08-22 RX ADMIN — ENOXAPARIN SODIUM 40 MG: 100 INJECTION SUBCUTANEOUS at 21:31

## 2024-08-22 ASSESSMENT — PAIN SCALES - GENERAL: PAINLEVEL_OUTOF10: 6

## 2024-08-22 ASSESSMENT — PAIN DESCRIPTION - LOCATION: LOCATION: HEAD

## 2024-08-22 NOTE — PROGRESS NOTES
Hospitalist Progress Note-critical care note     Patient: Noreen Ledezma MRN: [id number]  CSN: [id number]    YOB: 1960  Age: 64 y.o.  Sex: male    DOA: 8/20/2024 LOS:  LOS: 2 days            Chief complaint: cellulitis , sepsis , positive bcx gerd     Assessment/Plan         Active Hospital Problems    Diagnosis Date Noted    Positive blood culture [R78.81] 08/22/2024    Sepsis (HCC) [A41.9] 08/20/2024    Cellulitis of right thigh [L03.115] 08/20/2024    Diarrhea [R19.7] 08/20/2024    GERD (gastroesophageal reflux disease) [K21.9] 05/28/2018    HTN (hypertension) [I10] 05/28/2018    Probable sepsis [R68.89] 05/28/2018    S/P splenectomy [Z90.81] 04/27/2014        Sepsis  and cellulitis , positive bcx   Cellulitis improving, but still reported pain   Bcx staph ID consulted   Wbc better , down to 13 K      Diarrhea resolved   Iv hydration, send for c diff and covid negative   Ct abdomen on admission : no acute issue       HTN, accelerated  Continue home medication.     Brian due to dehydration hypercalcemia   Due to dehydration  Resolved      Gerd ppi      S/p splenectomy      DM on metformin at home   Ssi          Subjective no fever, no diarrhea, still pain in leg      TIME: E/M Time spent with patient and patient care issues: [] 31-40 mins  [x] 41-49 mins  [] 50 mins or more.      Disposition :tbd,   Review of systems:    General: No fevers or chills.  Cardiovascular: No chest pain or pressure. No palpitations.   Pulmonary: No shortness of breath.   Gastrointestinal: No nausea, vomiting.   Msk : leg pain     Vital signs/Intake and Output:  Visit Vitals  BP (!) 142/95   Pulse 80   Temp 98.4 °F (36.9 °C) (Oral)   Resp 18   Ht 1.727 m (5' 8\")   Wt 88.9 kg (196 lb)   SpO2 99%   BMI 29.80 kg/m²     Current Shift:  08/22 0701 - 08/22 1900  In: -   Out: 550 [Urine:550]  Last three shifts:  08/20 1901 - 08/22 0700  In: 3499.8 [I.V.:449.6]  Out: 775 [Urine:775]        Physical Exam:  General: WD, WN.  Alert,

## 2024-08-22 NOTE — CONSULTS
[facility]  (A Division of [facility])                                                           Date of Admission: 8/20/2024       Reason for Consult: Bacteremia  Referring MD: Analilia Banuelos    C/C: right thigh pain/redness/vomiting/diarrhea    Current Antimicrobials:    Prior Antimicrobials:    Ceftriaxone/Vancomycin 8/20 to date      Allergy to antibiotics: NA       Assessment--ID related:     Probable right thigh soft tissue infection/Cellulitis-- without evidence of abscess on CT  Sepsis syndrome +/- toxin contributing  Enteritis with nausea and vomiting-- -no evidence of colitis/ C.diff negative. Enteric panel pending  Bacteremia- with CoNS-- likely procurement contamination   History of splenectomy-- 1980's-- with low grade leucocytosis at baseline    Microlab data:    8/20--Blood culture + for CoNS            UA without pyuria, urine culture             C.diff toxin-negative            Enteric  panel for shigella spp, campylobacter, vibrio, salmonella, Y enterocolitica, and toxin for shiga toxin and E.coli ET --pending    Co-morbidities:  History of NHL  On testostrone supplement treatment  GERD  DM  History of hip replacement  History of severe COVID 19 infection with long COVID 19 symptoms- 2021      Recommendation -- ID related:     No overt sign of cellulitis- right thigh on exam, or abscess on CT but still quite tender to exam.    Emperic coverage for cellulitis, cont ceftriaxone, change vancomycin to linezolid-- to faciltate PO on discharge. MRSA screen  Check CPK, LFT in am  Daily labs  Supportive care and additional treatment per respective team        Thank you for involving me in the care of this patient. Please do not hesitate to contact me if question or concern.    DW patient and his family in room on ID plans      HPI:      Noreen Ledezma is a 64 y.o. male with PMH of NHL, history of splenectomy. On Testostrone injection Q3 weeks.     He woke up

## 2024-08-22 NOTE — PROGRESS NOTES
2019 Cdiff test still pending   Cdiff pending. Okayed by On call to given zofran early  8829 Still pending.  Patient has nausea.   600+ urine  Patient alert and oriented x4 ambulatory independently. No complaints of pain.   This writer gave Handoff in regards to:  Noreen TAYLOR Ledezma (64 y.o., male)    : 1960    Admitted: 2024     This Patient will be received by the oncoming Nurse; Amena    All questions and concerns of the Nurse, Patient, and/or Family were addressed at this time. Report on patient's status and plan of care was given.

## 2024-08-22 NOTE — PROGRESS NOTES
Physician Progress Note      PATIENT:               JONO GALLEGO  CSN #:                  [id number]  :                       1960  ADMIT DATE:       2024 1:41 PM  DISCH DATE:  RESPONDING  PROVIDER #:        Analilia Payan MD          QUERY TEXT:    Dear Hospitalist    Pt admitted with sepsis  with  cellulitis.  Pt noted to have done  the self   injection testosterone.  If possible, please document in the progress notes   and discharge summary if you are evaluating and / or treating any of the   following:    The medical record reflects the following:  Risk Factors: h/o  NH  Lymphoma;   s/p splenectomy  Clinical Indicators: per  H&P-\" presented to ER due to right leg pain.  He did   the self injection testosterone, noted his right thigh become redness and the   painful.\"  Treatment:   IV  rocephin;  IV  vanco;    Thank you,   Lissa Aquino RN   CCDS  Options provided:  -- Sepsis/ cellulitis possibly  related to self  injection  -- Sepsis unrelated to self  injection  -- Other - I will add my own diagnosis  -- Disagree - Not applicable / Not valid  -- Disagree - Clinically unable to determine / Unknown  -- Refer to Clinical Documentation Reviewer    PROVIDER RESPONSE TEXT:    This patient has Sepsis/ cellulitis possibly related to self injection    Query created by: Vandana Aquino on 2024 9:35 AM      Electronically signed by:  Analilia Payan MD 2024 1:45 PM

## 2024-08-22 NOTE — PROGRESS NOTES
Hospitalist Progress Note-critical care note     Patient: Noreen Ledezma MRN: [id number]  CSN: [id number]    YOB: 1960  Age: 64 y.o.  Sex: male    DOA: 8/20/2024 LOS:  LOS: 2 days            Chief complaint: cellulitis , sepsis , positive bcx gerd     Assessment/Plan         Active Hospital Problems    Diagnosis Date Noted    Sepsis (HCC) [A41.9] 08/20/2024    Cellulitis of right thigh [L03.115] 08/20/2024    Diarrhea [R19.7] 08/20/2024    GERD (gastroesophageal reflux disease) [K21.9] 05/28/2018    HTN (hypertension) [I10] 05/28/2018    Probable sepsis [R68.89] 05/28/2018    S/P splenectomy [Z90.81] 04/27/2014        Sepsis  and cellulitis mild improving   Continue iv  abx and f/u with cx   Septic protocol, pan cx , iv fluid ,lactic acid monitoring  Iv abx started in Er and will continue, received iv n bolus      Diarrhea resolved   Iv hydration, send for c diff and covid   Continue f/u   Ct abdomen no acute issue       HTN, accelerated  Continue home medication.     Brian due to dehydration hypercalcemia   Due to dehydration   Improving      Gerd ppi      S/p splenectomy      DM on metformin at home   Ssi      Positive bcx already on vanc will have id on board     Rn +bcx     Subjective no fever, no diarrhea, still pain in leg    TIME: E/M Time spent with patient and patient care issues: [] 31-40 mins  [x] 41-49 mins  [] 50 mins or more.      Disposition :tbd,   Review of systems:    General: No fevers or chills.  Cardiovascular: No chest pain or pressure. No palpitations.   Pulmonary: No shortness of breath.   Gastrointestinal: No nausea, vomiting.     Vital signs/Intake and Output:  Visit Vitals  BP (!) 125/99   Pulse 93   Temp 98.4 °F (36.9 °C) (Oral)   Resp 18   Ht 1.727 m (5' 8\")   Wt 88.9 kg (196 lb)   SpO2 99%   BMI 29.80 kg/m²     Current Shift:  08/22 0701 - 08/22 1900  In: -   Out: 550 [Urine:550]  Last three shifts:  08/20 1901 - 08/22 0700  In: 3499.8 [I.V.:449.6]  Out: 020

## 2024-08-22 NOTE — PROGRESS NOTES
[facility]   Pharmacy Pharmacokinetic Monitoring Service - Vancomycin    Consulting Provider: Dr. Payan   Indication: Skin and Soft Tissue Infection x 7 days  Target Concentration: Goal AUC/QUETA 400-600 mg*hr/L  Day of Therapy: 3  Additional Antimicrobials: Rocephin    Pertinent Laboratory Values:   Wt Readings from Last 1 Encounters:   08/20/24 88.9 kg (196 lb)     Temp Readings from Last 1 Encounters:   08/22/24 98.4 °F (36.9 °C) (Oral)     Estimated Creatinine Clearance: 77 mL/min (based on SCr of 1.06 mg/dL).  Recent Labs     08/21/24  0213 08/22/24  0243   CREATININE 1.17 1.06   BUN 33* 24*   WBC 18.2* 13.2         Pertinent Cultures:  Culture Date Source Results   8/20 Blood x 2 GPC in clusters  Staph   MRSA Nasal Swab: N/A. Non-respiratory infection.    Recent vancomycin administrations                     vancomycin (VANCOCIN) 2000 mg in 0.9% sodium chloride 500 mL IVPB (mg) 2,000 mg New Bag 08/20/24 2611                      Plan:  Current dosing regimen is sub-therapeutic  Increase dose to Vancomycin 1,000 mg IV every 12 hours  Anticipated AUC/Tr = 542 mg/L.hr, 16.5 mg/L at Steady State  Repeat vancomycin concentration returned at 21.8 ug/ml. Level was drawn within 3 hours post infusion. Possibly false elevation. Will repeat level 8/23 @ 0600  Pharmacy will continue to monitor patient and adjust therapy as indicated    Thank you for the consult,  SOLO WOODWARD RPH, PharmD  8/22/2024 9:19 AM

## 2024-08-22 NOTE — PLAN OF CARE
Problem: Discharge Planning  Goal: Discharge to home or other facility with appropriate resources  Outcome: Progressing  Flowsheets (Taken 8/22/2024 0930)  Discharge to home or other facility with appropriate resources:   Identify barriers to discharge with patient and caregiver   Arrange for needed discharge resources and transportation as appropriate   Identify discharge learning needs (meds, wound care, etc)     Problem: Pain  Goal: Verbalizes/displays adequate comfort level or baseline comfort level  Outcome: Progressing     Problem: Safety - Adult  Goal: Free from fall injury  Outcome: Progressing     Problem: Chronic Conditions and Co-morbidities  Goal: Patient's chronic conditions and co-morbidity symptoms are monitored and maintained or improved  Outcome: Progressing  Flowsheets (Taken 8/22/2024 0930)  Care Plan - Patient's Chronic Conditions and Co-Morbidity Symptoms are Monitored and Maintained or Improved:   Monitor and assess patient's chronic conditions and comorbid symptoms for stability, deterioration, or improvement   Collaborate with multidisciplinary team to address chronic and comorbid conditions and prevent exacerbation or deterioration

## 2024-08-23 VITALS
SYSTOLIC BLOOD PRESSURE: 131 MMHG | OXYGEN SATURATION: 96 % | WEIGHT: 196 LBS | BODY MASS INDEX: 29.7 KG/M2 | HEIGHT: 68 IN | RESPIRATION RATE: 18 BRPM | DIASTOLIC BLOOD PRESSURE: 82 MMHG | TEMPERATURE: 98.2 F | HEART RATE: 88 BPM

## 2024-08-23 LAB
ALBUMIN SERPL-MCNC: 3.2 G/DL (ref 3.4–5)
ALBUMIN/GLOB SERPL: 1 (ref 0.8–1.7)
ALP SERPL-CCNC: 60 U/L (ref 45–117)
ALT SERPL-CCNC: 62 U/L (ref 16–61)
ANION GAP SERPL CALC-SCNC: 6 MMOL/L (ref 3–18)
AST SERPL-CCNC: 40 U/L (ref 10–38)
BACTERIA SPEC CULT: NORMAL
BACTERIA SPEC CULT: NORMAL
BASOPHILS # BLD: 0.1 K/UL (ref 0–0.1)
BASOPHILS NFR BLD: 0 % (ref 0–2)
BILIRUB DIRECT SERPL-MCNC: 0.1 MG/DL (ref 0–0.2)
BILIRUB SERPL-MCNC: 0.3 MG/DL (ref 0.2–1)
BUN SERPL-MCNC: 21 MG/DL (ref 7–18)
BUN/CREAT SERPL: 17 (ref 12–20)
CALCIUM SERPL-MCNC: 9.9 MG/DL (ref 8.5–10.1)
CHLORIDE SERPL-SCNC: 107 MMOL/L (ref 100–111)
CK SERPL-CCNC: 29 U/L (ref 39–308)
CO2 SERPL-SCNC: 29 MMOL/L (ref 21–32)
CREAT SERPL-MCNC: 1.21 MG/DL (ref 0.6–1.3)
DIFFERENTIAL METHOD BLD: ABNORMAL
EOSINOPHIL # BLD: 0.1 K/UL (ref 0–0.4)
EOSINOPHIL NFR BLD: 1 % (ref 0–5)
ERYTHROCYTE [DISTWIDTH] IN BLOOD BY AUTOMATED COUNT: 15.3 % (ref 11.6–14.5)
GLOBULIN SER CALC-MCNC: 3.2 G/DL (ref 2–4)
GLUCOSE BLD STRIP.AUTO-MCNC: 100 MG/DL (ref 70–110)
GLUCOSE BLD STRIP.AUTO-MCNC: 89 MG/DL (ref 70–110)
GLUCOSE SERPL-MCNC: 97 MG/DL (ref 74–99)
HCT VFR BLD AUTO: 43 % (ref 36–48)
HGB BLD-MCNC: 14.2 G/DL (ref 13–16)
IMM GRANULOCYTES # BLD AUTO: 0.1 K/UL (ref 0–0.04)
IMM GRANULOCYTES NFR BLD AUTO: 0 % (ref 0–0.5)
LYMPHOCYTES # BLD: 3.9 K/UL (ref 0.9–3.6)
LYMPHOCYTES NFR BLD: 24 % (ref 21–52)
MCH RBC QN AUTO: 33.2 PG (ref 24–34)
MCHC RBC AUTO-ENTMCNC: 33 G/DL (ref 31–37)
MCV RBC AUTO: 100.5 FL (ref 78–100)
MONOCYTES # BLD: 1.6 K/UL (ref 0.05–1.2)
MONOCYTES NFR BLD: 10 % (ref 3–10)
NEUTS SEG # BLD: 10.4 K/UL (ref 1.8–8)
NEUTS SEG NFR BLD: 65 % (ref 40–73)
NRBC # BLD: 0 K/UL (ref 0–0.01)
NRBC BLD-RTO: 0 PER 100 WBC
PLATELET # BLD AUTO: 539 K/UL (ref 135–420)
PMV BLD AUTO: 9 FL (ref 9.2–11.8)
POTASSIUM SERPL-SCNC: 5.3 MMOL/L (ref 3.5–5.5)
PROT SERPL-MCNC: 6.4 G/DL (ref 6.4–8.2)
RBC # BLD AUTO: 4.28 M/UL (ref 4.35–5.65)
SERVICE CMNT-IMP: NORMAL
SODIUM SERPL-SCNC: 142 MMOL/L (ref 136–145)
VANCOMYCIN SERPL-MCNC: 8 UG/ML (ref 5–40)
WBC # BLD AUTO: 16.1 K/UL (ref 4.6–13.2)

## 2024-08-23 PROCEDURE — 82962 GLUCOSE BLOOD TEST: CPT

## 2024-08-23 PROCEDURE — 6360000002 HC RX W HCPCS: Performed by: HOSPITALIST

## 2024-08-23 PROCEDURE — 80076 HEPATIC FUNCTION PANEL: CPT

## 2024-08-23 PROCEDURE — 82550 ASSAY OF CK (CPK): CPT

## 2024-08-23 PROCEDURE — 6370000000 HC RX 637 (ALT 250 FOR IP): Performed by: HOSPITALIST

## 2024-08-23 PROCEDURE — 6360000002 HC RX W HCPCS: Performed by: INTERNAL MEDICINE

## 2024-08-23 PROCEDURE — 2580000003 HC RX 258: Performed by: HOSPITALIST

## 2024-08-23 PROCEDURE — 94640 AIRWAY INHALATION TREATMENT: CPT

## 2024-08-23 PROCEDURE — 85025 COMPLETE CBC W/AUTO DIFF WBC: CPT

## 2024-08-23 PROCEDURE — 36415 COLL VENOUS BLD VENIPUNCTURE: CPT

## 2024-08-23 PROCEDURE — 80048 BASIC METABOLIC PNL TOTAL CA: CPT

## 2024-08-23 PROCEDURE — 80202 ASSAY OF VANCOMYCIN: CPT

## 2024-08-23 RX ORDER — LINEZOLID 600 MG/1
600 TABLET, FILM COATED ORAL 2 TIMES DAILY
Qty: 14 TABLET | Refills: 0 | Status: SHIPPED | OUTPATIENT
Start: 2024-08-23 | End: 2024-08-30

## 2024-08-23 RX ADMIN — METOPROLOL SUCCINATE 12.5 MG: 25 TABLET, EXTENDED RELEASE ORAL at 09:03

## 2024-08-23 RX ADMIN — ASPIRIN 81 MG: 81 TABLET, COATED ORAL at 09:03

## 2024-08-23 RX ADMIN — POLYETHYLENE GLYCOL 3350 17 G: 17 POWDER, FOR SOLUTION ORAL at 09:13

## 2024-08-23 RX ADMIN — SODIUM CHLORIDE, PRESERVATIVE FREE 10 ML: 5 INJECTION INTRAVENOUS at 09:04

## 2024-08-23 RX ADMIN — TAMSULOSIN HYDROCHLORIDE 0.4 MG: 0.4 CAPSULE ORAL at 09:03

## 2024-08-23 RX ADMIN — ARFORMOTEROL TARTRATE: 15 SOLUTION RESPIRATORY (INHALATION) at 08:15

## 2024-08-23 RX ADMIN — LINEZOLID 600 MG: 600 INJECTION, SOLUTION INTRAVENOUS at 09:06

## 2024-08-23 ASSESSMENT — PAIN DESCRIPTION - LOCATION: LOCATION: ABDOMEN

## 2024-08-23 ASSESSMENT — PAIN DESCRIPTION - ORIENTATION: ORIENTATION: UPPER;RIGHT

## 2024-08-23 ASSESSMENT — PAIN DESCRIPTION - DESCRIPTORS: DESCRIPTORS: DISCOMFORT

## 2024-08-23 ASSESSMENT — PAIN SCALES - GENERAL: PAINLEVEL_OUTOF10: 3

## 2024-08-23 NOTE — PROGRESS NOTES
Patient being discharged home. IV removed. Telemetry removed. AVS reviewed and teach back performed. Denies pain at this time. Patient being  by daughter, and educated to call nurses desk when arrived.

## 2024-08-23 NOTE — PLAN OF CARE
Problem: Discharge Planning  Goal: Discharge to home or other facility with appropriate resources  8/22/2024 2014 by Perlita Stuart RN  Outcome: Progressing  8/22/2024 1149 by Amena León RN  Outcome: Progressing  Flowsheets (Taken 8/22/2024 0930)  Discharge to home or other facility with appropriate resources:   Identify barriers to discharge with patient and caregiver   Arrange for needed discharge resources and transportation as appropriate   Identify discharge learning needs (meds, wound care, etc)     Problem: Pain  Goal: Verbalizes/displays adequate comfort level or baseline comfort level  8/22/2024 2014 by Perlita Stuart RN  Outcome: Progressing  Flowsheets (Taken 8/22/2024 2000)  Verbalizes/displays adequate comfort level or baseline comfort level:   Assess pain using appropriate pain scale   Encourage patient to monitor pain and request assistance   Administer analgesics based on type and severity of pain and evaluate response  8/22/2024 1149 by Amena León RN  Outcome: Progressing     Problem: Safety - Adult  Goal: Free from fall injury  8/22/2024 2014 by Perlita Stuart RN  Outcome: Progressing  8/22/2024 1149 by Amena León RN  Outcome: Progressing     Problem: Chronic Conditions and Co-morbidities  Goal: Patient's chronic conditions and co-morbidity symptoms are monitored and maintained or improved  8/22/2024 2014 by Perlita Stuart RN  Outcome: Progressing  Flowsheets (Taken 8/22/2024 2000)  Care Plan - Patient's Chronic Conditions and Co-Morbidity Symptoms are Monitored and Maintained or Improved:   Monitor and assess patient's chronic conditions and comorbid symptoms for stability, deterioration, or improvement   Collaborate with multidisciplinary team to address chronic and comorbid conditions and prevent exacerbation or deterioration   Update acute care plan with appropriate goals if chronic or comorbid symptoms are exacerbated and prevent overall improvement and  discharge  8/22/2024 1149 by Amena León, RN  Outcome: Progressing  Flowsheets (Taken 8/22/2024 0930)  Care Plan - Patient's Chronic Conditions and Co-Morbidity Symptoms are Monitored and Maintained or Improved:   Monitor and assess patient's chronic conditions and comorbid symptoms for stability, deterioration, or improvement   Collaborate with multidisciplinary team to address chronic and comorbid conditions and prevent exacerbation or deterioration

## 2024-08-23 NOTE — PLAN OF CARE
Problem: Discharge Planning  Goal: Discharge to home or other facility with appropriate resources  8/23/2024 0946 by Amena León RN  Outcome: Progressing  Flowsheets (Taken 8/23/2024 0815)  Discharge to home or other facility with appropriate resources:   Identify barriers to discharge with patient and caregiver   Arrange for needed discharge resources and transportation as appropriate   Identify discharge learning needs (meds, wound care, etc)  8/22/2024 2014 by Perlita Stuart RN  Outcome: Progressing     Problem: Pain  Goal: Verbalizes/displays adequate comfort level or baseline comfort level  8/23/2024 0946 by Amena León RN  Outcome: Progressing  8/22/2024 2014 by Perlita Stuart RN  Outcome: Progressing  Flowsheets (Taken 8/22/2024 2000)  Verbalizes/displays adequate comfort level or baseline comfort level:   Assess pain using appropriate pain scale   Encourage patient to monitor pain and request assistance   Administer analgesics based on type and severity of pain and evaluate response     Problem: Safety - Adult  Goal: Free from fall injury  8/23/2024 0946 by Amena León RN  Outcome: Progressing  8/22/2024 2014 by Perlita Stuart RN  Outcome: Progressing     Problem: Chronic Conditions and Co-morbidities  Goal: Patient's chronic conditions and co-morbidity symptoms are monitored and maintained or improved  8/23/2024 0946 by Amena León RN  Outcome: Progressing  Flowsheets (Taken 8/23/2024 0815)  Care Plan - Patient's Chronic Conditions and Co-Morbidity Symptoms are Monitored and Maintained or Improved:   Monitor and assess patient's chronic conditions and comorbid symptoms for stability, deterioration, or improvement   Collaborate with multidisciplinary team to address chronic and comorbid conditions and prevent exacerbation or deterioration  8/22/2024 2014 by Perlita Stuart RN  Outcome: Progressing  Flowsheets (Taken 8/22/2024 2000)  Care Plan - Patient's Chronic  Conditions and Co-Morbidity Symptoms are Monitored and Maintained or Improved:   Monitor and assess patient's chronic conditions and comorbid symptoms for stability, deterioration, or improvement   Collaborate with multidisciplinary team to address chronic and comorbid conditions and prevent exacerbation or deterioration   Update acute care plan with appropriate goals if chronic or comorbid symptoms are exacerbated and prevent overall improvement and discharge

## 2024-08-23 NOTE — DISCHARGE SUMMARY
Discharge Summary    Patient: Noreen Ledezma MRN: [id number]  CSN: [id number]    YOB: 1960  Age: 64 y.o.  Sex: male    DOA: 8/20/2024 LOS:  LOS: 3 days   Discharge Date: 8/23/2024, 1:14 PM      Primary Care Provider:  Marli Pride MD    Admission Diagnoses: TREY (acute kidney injury) (HCC) [N17.9]  Cellulitis of right lower extremity [L03.115]  Cellulitis of lower leg [L03.119]  Sepsis (HCC) [A41.9]  Sepsis, due to unspecified organism, unspecified whether acute organ dysfunction present (HCC) [A41.9]    Discharge Diagnoses:    Active Hospital Problems    Diagnosis Date Noted    Positive blood culture [R78.81] 08/22/2024    Sepsis (HCC) [A41.9] 08/20/2024    Cellulitis of right thigh [L03.115] 08/20/2024    Diarrhea [R19.7] 08/20/2024    GERD (gastroesophageal reflux disease) [K21.9] 05/28/2018    HTN (hypertension) [I10] 05/28/2018    Probable sepsis [R68.89] 05/28/2018    S/P splenectomy [Z90.81] 04/27/2014       Discharge Condition: stable     Discharge Medications:        Medication List        START taking these medications      linezolid 600 MG tablet  Commonly known as: Zyvox  Take 1 tablet by mouth 2 times daily for 7 days            CONTINUE taking these medications      albuterol sulfate  (90 Base) MCG/ACT inhaler  Commonly known as: PROVENTIL;VENTOLIN;PROAIR     alendronate 10 MG tablet  Commonly known as: FOSAMAX     Alpha-Lipoic Acid 600 MG Caps     aspirin 81 MG EC tablet     DULoxetine 60 MG extended release capsule  Commonly known as: CYMBALTA     esomeprazole 20 MG delayed release capsule  Commonly known as: NexIUM     ferrous sulfate 325 (65 Fe) MG tablet  Commonly known as: IRON 325     HYDROcodone-acetaminophen 5-325 MG per tablet  Commonly known as: NORCO     losartan-hydroCHLOROthiazide 100-12.5 MG per tablet  Commonly known as: Hyzaar  Take 1 tablet by mouth daily     metFORMIN 500 MG extended release tablet  Commonly known as: GLUCOPHAGE-XR     metoprolol succinate 25 MG  He also has elevated creatinine 1.54.  Calcium 10.5. he also reported he has diarrhea am -watery 6-7 times with abdomen cramping. Ct abdomen no acute process. Lipase 85 , reported fatigue and chills.      Denies any slurred speech/headache/cp/n/v/blurred vission/d/c/palpitation/gait change/bleeding. Denies smoking/ any alcohol or drug use.   Hospital Course :   Pt was admitted for cellulitis and possible sepsis    cellulitis , positive bcx received vanc and rocephin since admission. ID consulted for positive bcx -contaminated. Pt was cleared to be dc per ID and recommend zyvax for 7 days. Cellulitis resolving on dc      Diarrhea resolved   Ct abdomen on admission : no acute issue       HTN, accelerated  Continue home medication.     Brian due to dehydration hypercalcemia   Due to dehydration, Resolved     Discharge planning discussed with patient, pt agrees  with the plan and no questions and concerns at this point.       Activity: activity as tolerated    Diet: regular diet    Follow-up: PCP     Disposition: home     Minutes spent on discharge: 45 min       Labs: Results:       Chemistry Recent Labs     08/20/24  1349 08/21/24  0213 08/22/24  0243 08/23/24  0538    136 138 142   K 4.6 4.4 4.5 5.3    107 106 107   CO2 27 25 26 29   BUN 46* 33* 24* 21*   GLOB 3.8  --   --  3.2      CBC w/Diff Recent Labs     08/21/24  0213 08/22/24  0243 08/23/24  0538   WBC 18.2* 13.2 16.1*   RBC 4.00* 3.96* 4.28*   HGB 13.3 13.1 14.2   HCT 39.8 39.2 43.0   * 501* 539*      Cardiac Enzymes No results for input(s): \"CPK\" in the last 72 hours.    Invalid input(s): \"CKRMB\", \"CKND1\", \"TROIP\", \"DACIA\"   Coagulation No results for input(s): \"INR\", \"APTT\" in the last 72 hours.    Invalid input(s): \"PTP\"    Lipid Panel Lab Results   Component Value Date/Time    CHOL 141 09/27/2021 10:18 AM    HDL 39 09/27/2021 10:18 AM    LDL 41.4 09/27/2021 10:18 AM    VLDL 60.6 09/27/2021 10:18 AM      BNP Invalid input(s): \"BNPP\"   Liver  status post fusion of the cervical spine.     No acute process on portable chest. Electronically signed by JAGDEEP MOON    CT ABDOMEN PELVIS W IV CONTRAST Additional Contrast? Oral    Result Date: 8/20/2024  EXAM: CT ABDOMEN PELVIS W IV CONTRAST INDICATION: abdominal pain and bloating, r/o obstruction COMPARISON: 10/10/2020 CONTRAST: 100 mL of Isovue-370. ORAL CONTRAST: Oral contrast was administered to better evaluate the bowel. TECHNIQUE: Following intravenous administration of contrast, thin axial images were obtained through the abdomen and pelvis. Coronal and sagittal reconstructions were generated. CT dose reduction was achieved through use of a standardized protocol tailored for this examination and automatic exposure control for dose modulation. FINDINGS: LOWER THORAX: No significant abnormality in the incidentally imaged lower chest. LIVER: Hepatic steatosis. BILIARY TREE: Gallbladder is within normal limits. CBD is not dilated. SPLEEN: Status post splenectomy. PANCREAS: No mass or ductal dilatation. ADRENALS: Unremarkable. KIDNEYS: Small left renal cyst. STOMACH: Unremarkable. SMALL BOWEL: No dilatation or wall thickening. COLON: No dilatation or wall thickening. APPENDIX: Status post appendectomy. PERITONEUM: No ascites or pneumoperitoneum. RETROPERITONEUM: No lymphadenopathy or aortic aneurysm. REPRODUCTIVE ORGANS: Unremarkable. URINARY BLADDER: No mass or calculus. BONES: The patient is status post right total hip replacement. ABDOMINAL WALL: No mass or hernia. ADDITIONAL COMMENTS: N/A     Status post splenectomy and appendectomy. No acute abnormality. Electronically signed by JAGDEEP RIVERA,Internal Medicine     CC: Marli Pride MD

## 2024-08-23 NOTE — PROGRESS NOTES
[facility]  (A Division of [facility])                                                           Date of Admission: 8/20/2024       Reason for Consult: Bacteremia  Referring MD: Analilia Banuelos    C/C: right thigh pain/redness/vomiting/diarrhea    Current Antimicrobials:    Prior Antimicrobials:    Ceftriaxone/Vancomycin 8/20 to date      Allergy to antibiotics: NA       Assessment--ID related:     Probable right thigh soft tissue infection/Cellulitis-- without evidence of abscess on CT  Sepsis syndrome +/- toxin contributing likely. NO evidence of primary enteritis  Transaminitis due to above- improving  Enteritis with nausea and vomiting-- -no evidence of colitis, GB disease/ C.diff negative. Enteric panel -negative  Bacteremia- with CoNS-- likely procurement contamination   History of splenectomy-- 1980's-- with low grade leucocytosis at baseline    Microlab data:    8/20--Blood culture + for CoNS            UA without pyuria, urine culture             C.diff toxin-negative            Enteric  panel for shigella spp, campylobacter, vibrio, salmonella, Y enterocolitica, and toxin for shiga toxin and E.coli ET --Negative    8/22- MRSA screen-Pending    Co-morbidities:  History of NHL  On testostrone supplement treatment  GERD  DM  History of hip replacement  History of severe COVID 19 infection with long COVID 19 symptoms- 2021      Recommendation -- ID related:     No overt sign of cellulitis- right thigh on exam, or abscess on CT but still quite tender to exam-- CPK is normal, no evidence of myositis    Will continue emperic coverage for staph/strep. Placed on linezolid -- tolerating well  Can switch to PO linezolid--600 mg BID--for 7 days from today. Discussed side effect, drug-food interaction  DC ceftriaxone  MRSA screen pending-- result will not  above( for documentation)  Can DC from ID side, if cleared by all services  Diagnosis and treatment

## 2024-08-25 LAB
BACTERIA SPEC CULT: NORMAL
SERVICE CMNT-IMP: NORMAL

## 2024-08-26 LAB
BACTERIA SPEC CULT: NORMAL
SERVICE CMNT-IMP: NORMAL

## (undated) DEVICE — STERILE POLYISOPRENE POWDER-FREE SURGICAL GLOVES WITH EMOLLIENT COATING: Brand: PROTEXIS

## (undated) DEVICE — SOL IRRIGATION INJ NACL 0.9% 500ML BTL

## (undated) DEVICE — STERILE POLYISOPRENE POWDER-FREE SURGICAL GLOVES: Brand: PROTEXIS

## (undated) DEVICE — NEEDLE BX 11GA L101MM BNE MAR ASPIR W/ ADPT JAMSH

## (undated) DEVICE — SYRINGE BLB 50CC IRRIG PLIABLE FNGR FLNG GRAD FLSK DISP

## (undated) DEVICE — MAYO STAND COVER: Brand: CONVERTORS

## (undated) DEVICE — GUIDEWIRE VASC L260CM DIA0.035IN RAD 3MM J TIP L7CM PTFE

## (undated) DEVICE — CATH DIAG FR4 EXPO 5FRX100CM -- EXPO

## (undated) DEVICE — SYR LR LCK 1ML GRAD NSAF 30ML --

## (undated) DEVICE — (D)ADHESIVE TISS HI VISC 1ML -- DISC USE ITEM 346585

## (undated) DEVICE — SUTURE STRATAFIX SPRL SZ 4-0 L12IN ABSRB UD FS-2 L19MM 3/8 SXMP2B409

## (undated) DEVICE — NDL SPNE QNCKE 18GX3.5IN LF --

## (undated) DEVICE — DRAPE,ANGIO,BRACH,STERILE,38X44: Brand: MEDLINE

## (undated) DEVICE — TUBING PRSS MON L24IN PVC RIG NONEXPANDING M TO FEM CONN

## (undated) DEVICE — 7 FRENCH DRAIN SYSTEM, STERILE: Brand: TLS

## (undated) DEVICE — CATH IV AUTOGRD ORN 14GA 45MM -- INSYTE-N

## (undated) DEVICE — PACK PROCEDURE SURG CATH CUST

## (undated) DEVICE — GAUZE SPONGES,12 PLY: Brand: CURITY

## (undated) DEVICE — PROCEDURE KIT FLUID MGMT 10 FR CUST MAINFOLD

## (undated) DEVICE — PACK PROCEDURE SURG ANTR CERV DISCECTOMY

## (undated) DEVICE — HALTER TRACTION HD W/ TRI COTTON LINING FOAM LTX

## (undated) DEVICE — INSULATED BLADE ELECTRODE: Brand: EDGE

## (undated) DEVICE — Device

## (undated) DEVICE — LIMB HOLDER, WRIST/ANKLE: Brand: DEROYAL

## (undated) DEVICE — GLIDESHEATH SLENDER STAINLESS STEEL KIT: Brand: GLIDESHEATH SLENDER

## (undated) DEVICE — STOPCOCK TRNSDUC 500PSI 3 W ROT M LUER LT BLU OFF HNDL R

## (undated) DEVICE — SENSOR PLSE OXMTR AD CBL L36IN ADH FRM FIT SPO2 DISP

## (undated) DEVICE — SUTURE VCRL SZ 2-0 L18IN ABSRB VLT L26MM CT-2 1/2 CIR J726D

## (undated) DEVICE — KENDALL SCD EXPRESS SLEEVES, KNEE LENGTH, MEDIUM: Brand: KENDALL SCD

## (undated) DEVICE — HEAD REST BAGEL: Brand: DEVON

## (undated) DEVICE — PRESSURE MONITORING SET: Brand: TRUWAVE

## (undated) DEVICE — PIN TEMP FIX FOR SCREW

## (undated) DEVICE — 14MM DRILL

## (undated) DEVICE — BAND RADIAL COMPR ARTERY 24CM -- REG BX/10

## (undated) DEVICE — SHIELD RAD 14X16 IN W/ SCOOP ABSORBER

## (undated) DEVICE — PACKING 8004000 NEURAY 200PK 13X13MM: Brand: NEURAY ®

## (undated) DEVICE — TORCON NB, ADVANTAGE CATHETER: Brand: TORCON NB

## (undated) DEVICE — (D)PREP SKN CHLRAPRP APPL 26ML -- CONVERT TO ITEM 371833